# Patient Record
Sex: FEMALE | Race: WHITE | NOT HISPANIC OR LATINO | Employment: OTHER
[De-identification: names, ages, dates, MRNs, and addresses within clinical notes are randomized per-mention and may not be internally consistent; named-entity substitution may affect disease eponyms.]

---

## 2017-02-15 ENCOUNTER — RECORDS - HEALTHEAST (OUTPATIENT)
Dept: ADMINISTRATIVE | Facility: OTHER | Age: 62
End: 2017-02-15

## 2017-02-19 ENCOUNTER — COMMUNICATION - HEALTHEAST (OUTPATIENT)
Dept: INTERNAL MEDICINE | Facility: CLINIC | Age: 62
End: 2017-02-19

## 2017-02-19 DIAGNOSIS — I10 HTN (HYPERTENSION): ICD-10-CM

## 2017-03-24 ENCOUNTER — COMMUNICATION - HEALTHEAST (OUTPATIENT)
Dept: SCHEDULING | Facility: CLINIC | Age: 62
End: 2017-03-24

## 2017-03-24 ENCOUNTER — OFFICE VISIT - HEALTHEAST (OUTPATIENT)
Dept: INTERNAL MEDICINE | Facility: CLINIC | Age: 62
End: 2017-03-24

## 2017-03-24 DIAGNOSIS — J06.9 URI (UPPER RESPIRATORY INFECTION): ICD-10-CM

## 2017-03-24 ASSESSMENT — MIFFLIN-ST. JEOR: SCORE: 1314.15

## 2017-03-31 ENCOUNTER — COMMUNICATION - HEALTHEAST (OUTPATIENT)
Dept: INTERNAL MEDICINE | Facility: CLINIC | Age: 62
End: 2017-03-31

## 2017-05-10 ENCOUNTER — COMMUNICATION - HEALTHEAST (OUTPATIENT)
Dept: INTERNAL MEDICINE | Facility: CLINIC | Age: 62
End: 2017-05-10

## 2017-05-10 DIAGNOSIS — I10 HTN (HYPERTENSION): ICD-10-CM

## 2017-05-11 ENCOUNTER — RECORDS - HEALTHEAST (OUTPATIENT)
Dept: ADMINISTRATIVE | Facility: OTHER | Age: 62
End: 2017-05-11

## 2017-05-16 ENCOUNTER — RECORDS - HEALTHEAST (OUTPATIENT)
Dept: ADMINISTRATIVE | Facility: OTHER | Age: 62
End: 2017-05-16

## 2017-06-22 ENCOUNTER — OFFICE VISIT - HEALTHEAST (OUTPATIENT)
Dept: INTERNAL MEDICINE | Facility: CLINIC | Age: 62
End: 2017-06-22

## 2017-06-22 DIAGNOSIS — Z01.818 PREOP EXAMINATION: ICD-10-CM

## 2017-06-22 LAB
ATRIAL RATE - MUSE: 56 BPM
DIASTOLIC BLOOD PRESSURE - MUSE: NORMAL MMHG
INTERPRETATION ECG - MUSE: NORMAL
P AXIS - MUSE: 64 DEGREES
PR INTERVAL - MUSE: 140 MS
QRS DURATION - MUSE: 86 MS
QT - MUSE: 466 MS
QTC - MUSE: 449 MS
R AXIS - MUSE: 63 DEGREES
SYSTOLIC BLOOD PRESSURE - MUSE: NORMAL MMHG
T AXIS - MUSE: 53 DEGREES
VENTRICULAR RATE- MUSE: 56 BPM

## 2017-06-22 ASSESSMENT — MIFFLIN-ST. JEOR: SCORE: 1297.14

## 2017-06-23 ENCOUNTER — COMMUNICATION - HEALTHEAST (OUTPATIENT)
Dept: INTERNAL MEDICINE | Facility: CLINIC | Age: 62
End: 2017-06-23

## 2017-06-26 ENCOUNTER — ANESTHESIA - HEALTHEAST (OUTPATIENT)
Dept: SURGERY | Facility: HOSPITAL | Age: 62
End: 2017-06-26

## 2017-06-26 ASSESSMENT — MIFFLIN-ST. JEOR: SCORE: 1297.14

## 2017-06-27 ENCOUNTER — SURGERY - HEALTHEAST (OUTPATIENT)
Dept: SURGERY | Facility: HOSPITAL | Age: 62
End: 2017-06-27

## 2017-06-27 ASSESSMENT — MIFFLIN-ST. JEOR: SCORE: 1288.86

## 2017-08-13 ENCOUNTER — COMMUNICATION - HEALTHEAST (OUTPATIENT)
Dept: INTERNAL MEDICINE | Facility: CLINIC | Age: 62
End: 2017-08-13

## 2017-08-13 DIAGNOSIS — I10 HTN (HYPERTENSION): ICD-10-CM

## 2017-09-29 ENCOUNTER — RECORDS - HEALTHEAST (OUTPATIENT)
Dept: ADMINISTRATIVE | Facility: OTHER | Age: 62
End: 2017-09-29

## 2017-10-18 ENCOUNTER — HOSPITAL ENCOUNTER (OUTPATIENT)
Dept: MAMMOGRAPHY | Facility: CLINIC | Age: 62
Discharge: HOME | End: 2017-10-18
Attending: OBSTETRICS & GYNECOLOGY

## 2017-10-18 DIAGNOSIS — Z12.31 VISIT FOR SCREENING MAMMOGRAM: ICD-10-CM

## 2017-11-20 ENCOUNTER — COMMUNICATION - HEALTHEAST (OUTPATIENT)
Dept: INTERNAL MEDICINE | Facility: CLINIC | Age: 62
End: 2017-11-20

## 2017-11-20 DIAGNOSIS — I10 HTN (HYPERTENSION): ICD-10-CM

## 2017-11-30 ENCOUNTER — OFFICE VISIT - HEALTHEAST (OUTPATIENT)
Dept: INTERNAL MEDICINE | Facility: CLINIC | Age: 62
End: 2017-11-30

## 2017-11-30 DIAGNOSIS — I10 ESSENTIAL HYPERTENSION: ICD-10-CM

## 2017-11-30 ASSESSMENT — MIFFLIN-ST. JEOR: SCORE: 1287.5

## 2017-12-01 ENCOUNTER — COMMUNICATION - HEALTHEAST (OUTPATIENT)
Dept: INTERNAL MEDICINE | Facility: CLINIC | Age: 62
End: 2017-12-01

## 2018-02-12 ENCOUNTER — COMMUNICATION - HEALTHEAST (OUTPATIENT)
Dept: INTERNAL MEDICINE | Facility: CLINIC | Age: 63
End: 2018-02-12

## 2018-02-12 DIAGNOSIS — I10 HTN (HYPERTENSION): ICD-10-CM

## 2018-05-02 ENCOUNTER — COMMUNICATION - HEALTHEAST (OUTPATIENT)
Dept: INTERNAL MEDICINE | Facility: CLINIC | Age: 63
End: 2018-05-02

## 2018-05-02 DIAGNOSIS — I10 HTN (HYPERTENSION): ICD-10-CM

## 2018-05-04 ENCOUNTER — COMMUNICATION - HEALTHEAST (OUTPATIENT)
Dept: INTERNAL MEDICINE | Facility: CLINIC | Age: 63
End: 2018-05-04

## 2018-05-04 ENCOUNTER — RECORDS - HEALTHEAST (OUTPATIENT)
Dept: ADMINISTRATIVE | Facility: OTHER | Age: 63
End: 2018-05-04

## 2018-05-04 DIAGNOSIS — I10 ESSENTIAL HYPERTENSION: ICD-10-CM

## 2018-08-17 ENCOUNTER — COMMUNICATION - HEALTHEAST (OUTPATIENT)
Dept: INTERNAL MEDICINE | Facility: CLINIC | Age: 63
End: 2018-08-17

## 2018-08-17 DIAGNOSIS — I10 ESSENTIAL HYPERTENSION: ICD-10-CM

## 2018-10-29 ENCOUNTER — HOSPITAL ENCOUNTER (OUTPATIENT)
Dept: MAMMOGRAPHY | Facility: CLINIC | Age: 63
Discharge: HOME | End: 2018-10-29
Attending: INTERNAL MEDICINE

## 2018-10-29 DIAGNOSIS — Z12.31 VISIT FOR SCREENING MAMMOGRAM: ICD-10-CM

## 2018-12-03 ENCOUNTER — COMMUNICATION - HEALTHEAST (OUTPATIENT)
Dept: INTERNAL MEDICINE | Facility: CLINIC | Age: 63
End: 2018-12-03

## 2018-12-03 DIAGNOSIS — I10 ESSENTIAL HYPERTENSION: ICD-10-CM

## 2019-02-04 ENCOUNTER — COMMUNICATION - HEALTHEAST (OUTPATIENT)
Dept: INTERNAL MEDICINE | Facility: CLINIC | Age: 64
End: 2019-02-04

## 2019-02-04 DIAGNOSIS — I10 ESSENTIAL HYPERTENSION: ICD-10-CM

## 2019-02-11 ENCOUNTER — COMMUNICATION - HEALTHEAST (OUTPATIENT)
Dept: INTERNAL MEDICINE | Facility: CLINIC | Age: 64
End: 2019-02-11

## 2019-02-11 DIAGNOSIS — I10 HTN (HYPERTENSION): ICD-10-CM

## 2019-02-25 ENCOUNTER — COMMUNICATION - HEALTHEAST (OUTPATIENT)
Dept: INTERNAL MEDICINE | Facility: CLINIC | Age: 64
End: 2019-02-25

## 2019-02-25 DIAGNOSIS — I10 ESSENTIAL HYPERTENSION: ICD-10-CM

## 2019-05-06 ENCOUNTER — COMMUNICATION - HEALTHEAST (OUTPATIENT)
Dept: INTERNAL MEDICINE | Facility: CLINIC | Age: 64
End: 2019-05-06

## 2019-05-06 DIAGNOSIS — I10 ESSENTIAL HYPERTENSION: ICD-10-CM

## 2019-05-13 ENCOUNTER — COMMUNICATION - HEALTHEAST (OUTPATIENT)
Dept: INTERNAL MEDICINE | Facility: CLINIC | Age: 64
End: 2019-05-13

## 2019-05-13 DIAGNOSIS — I10 ESSENTIAL HYPERTENSION: ICD-10-CM

## 2019-08-02 ENCOUNTER — COMMUNICATION - HEALTHEAST (OUTPATIENT)
Dept: INTERNAL MEDICINE | Facility: CLINIC | Age: 64
End: 2019-08-02

## 2019-08-02 DIAGNOSIS — I10 ESSENTIAL HYPERTENSION: ICD-10-CM

## 2019-08-20 ENCOUNTER — COMMUNICATION - HEALTHEAST (OUTPATIENT)
Dept: INTERNAL MEDICINE | Facility: CLINIC | Age: 64
End: 2019-08-20

## 2019-08-20 DIAGNOSIS — I10 ESSENTIAL HYPERTENSION: ICD-10-CM

## 2019-10-28 ENCOUNTER — COMMUNICATION - HEALTHEAST (OUTPATIENT)
Dept: INTERNAL MEDICINE | Facility: CLINIC | Age: 64
End: 2019-10-28

## 2019-10-28 DIAGNOSIS — I10 ESSENTIAL HYPERTENSION: ICD-10-CM

## 2019-11-14 ENCOUNTER — RECORDS - HEALTHEAST (OUTPATIENT)
Dept: ADMINISTRATIVE | Facility: OTHER | Age: 64
End: 2019-11-14

## 2019-11-19 ENCOUNTER — COMMUNICATION - HEALTHEAST (OUTPATIENT)
Dept: INTERNAL MEDICINE | Facility: CLINIC | Age: 64
End: 2019-11-19

## 2019-11-19 DIAGNOSIS — I10 HTN (HYPERTENSION): ICD-10-CM

## 2020-10-13 ENCOUNTER — COMMUNICATION - HEALTHEAST (OUTPATIENT)
Dept: INTERNAL MEDICINE | Facility: CLINIC | Age: 65
End: 2020-10-13

## 2020-10-13 DIAGNOSIS — I10 ESSENTIAL HYPERTENSION: ICD-10-CM

## 2020-10-17 RX ORDER — ATENOLOL AND CHLORTHALIDONE TABLET 50; 25 MG/1; MG/1
1 TABLET ORAL DAILY
Qty: 90 TABLET | Refills: 3 | Status: SHIPPED | OUTPATIENT
Start: 2020-10-17 | End: 2021-10-18

## 2020-10-17 RX ORDER — QUINAPRIL 40 MG/1
40 TABLET ORAL AT BEDTIME
Qty: 90 TABLET | Refills: 4 | Status: SHIPPED | OUTPATIENT
Start: 2020-10-17 | End: 2021-11-15

## 2020-10-27 ENCOUNTER — COMMUNICATION - HEALTHEAST (OUTPATIENT)
Dept: INTERNAL MEDICINE | Facility: CLINIC | Age: 65
End: 2020-10-27

## 2020-10-27 DIAGNOSIS — I10 ESSENTIAL HYPERTENSION: ICD-10-CM

## 2021-01-17 ENCOUNTER — COMMUNICATION - HEALTHEAST (OUTPATIENT)
Dept: INTERNAL MEDICINE | Facility: CLINIC | Age: 66
End: 2021-01-17

## 2021-01-17 DIAGNOSIS — I10 HTN (HYPERTENSION): ICD-10-CM

## 2021-01-18 RX ORDER — POTASSIUM CHLORIDE 1500 MG/1
TABLET, EXTENDED RELEASE ORAL
Qty: 90 TABLET | Refills: 3 | Status: SHIPPED | OUTPATIENT
Start: 2021-01-18 | End: 2022-01-25

## 2021-03-03 ENCOUNTER — COMMUNICATION - HEALTHEAST (OUTPATIENT)
Dept: INTERNAL MEDICINE | Facility: CLINIC | Age: 66
End: 2021-03-03

## 2021-03-23 ENCOUNTER — COMMUNICATION - HEALTHEAST (OUTPATIENT)
Dept: INTERNAL MEDICINE | Facility: CLINIC | Age: 66
End: 2021-03-23

## 2021-03-25 ENCOUNTER — OFFICE VISIT - HEALTHEAST (OUTPATIENT)
Dept: INTERNAL MEDICINE | Facility: CLINIC | Age: 66
End: 2021-03-25

## 2021-03-25 DIAGNOSIS — Z12.31 VISIT FOR SCREENING MAMMOGRAM: ICD-10-CM

## 2021-03-25 DIAGNOSIS — Z01.818 PREOPERATIVE EXAMINATION: ICD-10-CM

## 2021-03-25 LAB
HGB BLD-MCNC: 14.1 G/DL (ref 12–16)
POTASSIUM BLD-SCNC: 4.9 MMOL/L (ref 3.5–5)

## 2021-03-25 ASSESSMENT — MIFFLIN-ST. JEOR: SCORE: 1329.46

## 2021-03-26 ENCOUNTER — COMMUNICATION - HEALTHEAST (OUTPATIENT)
Dept: INTERNAL MEDICINE | Facility: CLINIC | Age: 66
End: 2021-03-26

## 2021-05-07 ENCOUNTER — RECORDS - HEALTHEAST (OUTPATIENT)
Dept: ADMINISTRATIVE | Facility: OTHER | Age: 66
End: 2021-05-07

## 2021-05-28 ENCOUNTER — RECORDS - HEALTHEAST (OUTPATIENT)
Dept: ADMINISTRATIVE | Facility: CLINIC | Age: 66
End: 2021-05-28

## 2021-05-28 NOTE — TELEPHONE ENCOUNTER
RN cannot approve Refill Request    RN can NOT refill this medication PCP messaged that patient is overdue for Labs and Office Visit.     Last office visit: 11/30/2017 Kaiden Pruitt MD Last Physical: 6/22/2017 Last MTM visit: Visit date not found Last visit same specialty: 11/30/2017 Kaiden Pruitt MD.  Next visit within 3 mo: Visit date not found  Next physical within 3 mo: Visit date not found      Jessica Blunt, [facility] Triage/Med Refill 5/6/2019    Requested Prescriptions   Pending Prescriptions Disp Refills     atenolol-chlorthalidone (TENORETIC) 50-25 mg per tablet [Pharmacy Med Name: ATENOLOL/CHLORTHALIDONE 50/25 TABS] 90 tablet 0     Sig: TAKE 1 TABLET BY MOUTH DAILY       Diuretics/Combination Diuretics Refill Protocol  Failed - 5/6/2019  3:25 AM        Failed - Visit with PCP or prescribing provider visit in past 12 months     Last office visit with prescriber/PCP: 11/30/2017 Kaiden Pruitt MD OR same dept: Visit date not found OR same specialty: 11/30/2017 Kaiden Pruitt MD  Last physical: 6/22/2017 Last MTM visit: Visit date not found   Next visit within 3 mo: Visit date not found  Next physical within 3 mo: Visit date not found  Prescriber OR PCP: Kaiden Pruitt MD  Last diagnosis associated with med order: 1. Essential hypertension  - atenolol-chlorthalidone (TENORETIC) 50-25 mg per tablet [Pharmacy Med Name: ATENOLOL/CHLORTHALIDONE 50/25 TABS]; TAKE 1 TABLET BY MOUTH DAILY  Dispense: 90 tablet; Refill: 0    If protocol passes may refill for 12 months if within 3 months of last provider visit (or a total of 15 months).             Failed - Serum Potassium in past 12 months      No results found for: LN-POTASSIUM          Failed - Serum Sodium in past 12 months      No results found for: LN-SODIUM          Failed - Blood pressure on file in past 12 months     BP Readings from Last 1 Encounters:   11/30/17 126/68             Failed - Serum Creatinine in past 12 months       Creatinine   Date Value Ref Range Status   06/22/2017 0.74 0.60 - 1.10 mg/dL Final

## 2021-05-29 ENCOUNTER — RECORDS - HEALTHEAST (OUTPATIENT)
Dept: ADMINISTRATIVE | Facility: CLINIC | Age: 66
End: 2021-05-29

## 2021-05-30 ENCOUNTER — RECORDS - HEALTHEAST (OUTPATIENT)
Dept: ADMINISTRATIVE | Facility: CLINIC | Age: 66
End: 2021-05-30

## 2021-05-30 VITALS — WEIGHT: 158 LBS | HEIGHT: 68 IN | BODY MASS INDEX: 23.95 KG/M2

## 2021-05-31 VITALS — HEIGHT: 68 IN | BODY MASS INDEX: 23.64 KG/M2 | WEIGHT: 156 LBS

## 2021-05-31 VITALS — HEIGHT: 68 IN | WEIGHT: 153 LBS | BODY MASS INDEX: 23.19 KG/M2

## 2021-05-31 VITALS — WEIGHT: 153.3 LBS | HEIGHT: 68 IN | BODY MASS INDEX: 23.23 KG/M2

## 2021-05-31 NOTE — TELEPHONE ENCOUNTER
RN cannot approve Refill Request    RN can NOT refill this medication PCP messaged that patient is overdue for Office Visit. Last office visit: 11/30/2017 Kaiden Pruitt MD Last Physical: 6/22/2017 Last MTM visit: Visit date not found Last visit same specialty: 11/30/2017 Kaiden Pruitt MD.  Next visit within 3 mo: Visit date not found  Next physical within 3 mo: Visit date not found      Jessica Blunt, [facility] Connection Triage/Med Refill 8/2/2019    Requested Prescriptions   Pending Prescriptions Disp Refills     atenolol-chlorthalidone (TENORETIC) 50-25 mg per tablet [Pharmacy Med Name: ATENOLOL/CHLORTHALIDONE 50/25 TABS] 90 tablet 0     Sig: TAKE 1 TABLET BY MOUTH DAILY       Diuretics/Combination Diuretics Refill Protocol  Failed - 8/2/2019  3:25 AM        Failed - Visit with PCP or prescribing provider visit in past 12 months     Last office visit with prescriber/PCP: 11/30/2017 Kaiden Pruitt MD OR same dept: Visit date not found OR same specialty: 11/30/2017 Kaiden Pruitt MD  Last physical: 6/22/2017 Last MTM visit: Visit date not found   Next visit within 3 mo: Visit date not found  Next physical within 3 mo: Visit date not found  Prescriber OR PCP: Kaiden Pruitt MD  Last diagnosis associated with med order: 1. Essential hypertension  - atenolol-chlorthalidone (TENORETIC) 50-25 mg per tablet [Pharmacy Med Name: ATENOLOL/CHLORTHALIDONE 50/25 TABS]; TAKE 1 TABLET BY MOUTH DAILY  Dispense: 90 tablet; Refill: 0    If protocol passes may refill for 12 months if within 3 months of last provider visit (or a total of 15 months).             Failed - Serum Potassium in past 12 months      No results found for: LN-POTASSIUM          Failed - Serum Sodium in past 12 months      No results found for: LN-SODIUM          Failed - Blood pressure on file in past 12 months     BP Readings from Last 1 Encounters:   11/30/17 126/68             Failed - Serum Creatinine in past 12 months       Creatinine   Date Value Ref Range Status   06/22/2017 0.74 0.60 - 1.10 mg/dL Final

## 2021-05-31 NOTE — TELEPHONE ENCOUNTER
RN cannot approve Refill Request    RN can NOT refill this medication PCP messaged that patient is overdue for Labs and Office Visit. Last office visit: 11/30/2017 Kaiden Pruitt MD Last Physical: 6/22/2017 Last MTM visit: Visit date not found Last visit same specialty: 11/30/2017 Kaiden Pruitt MD.  Next visit within 3 mo: Visit date not found  Next physical within 3 mo: Visit date not found      Linda DAVIS Marcelino, [facility] Triage/Med Refill 8/21/2019    Requested Prescriptions   Pending Prescriptions Disp Refills     quinapril (ACCUPRIL) 40 MG tablet [Pharmacy Med Name: QUINAPRIL TABS 40MG] 90 tablet 4     Sig: TAKE 1 TABLET AT BEDTIME       Ace Inhibitors Refill Protocol Failed - 8/20/2019  7:38 AM        Failed - PCP or prescribing provider visit in past 12 months       Last office visit with prescriber/PCP: 11/30/2017 Kaiden Pruitt MD OR same dept: Visit date not found OR same specialty: 11/30/2017 Kaiden Pruitt MD  Last physical: 6/22/2017 Last MTM visit: Visit date not found   Next visit within 3 mo: Visit date not found  Next physical within 3 mo: Visit date not found  Prescriber OR PCP: Kaiden Pruitt MD  Last diagnosis associated with med order: 1. Essential hypertension  - quinapril (ACCUPRIL) 40 MG tablet [Pharmacy Med Name: QUINAPRIL TABS 40MG]; TAKE 1 TABLET AT BEDTIME  Dispense: 90 tablet; Refill: 4    If protocol passes may refill for 12 months if within 3 months of last provider visit (or a total of 15 months).             Failed - Serum Potassium in past 12 months     No results found for: LN-POTASSIUM          Failed - Blood pressure filed in past 12 months     BP Readings from Last 1 Encounters:   11/30/17 126/68             Failed - Serum Creatinine in past 12 months     Creatinine   Date Value Ref Range Status   06/22/2017 0.74 0.60 - 1.10 mg/dL Final

## 2021-06-02 NOTE — TELEPHONE ENCOUNTER
RN cannot approve Refill Request    RN can NOT refill this medication Protocol failed and NO refill given.      Mary Ellen Jones, [facility] Triage/Med Refill 10/28/2019    Requested Prescriptions   Pending Prescriptions Disp Refills     atenolol-chlorthalidone (TENORETIC) 50-25 mg per tablet [Pharmacy Med Name: ATENOLOL/CHLORTHALIDONE 50/25 TABS] 90 tablet 3     Sig: TAKE 1 TABLET BY MOUTH DAILY       Diuretics/Combination Diuretics Refill Protocol  Failed - 10/28/2019  3:24 AM        Failed - Visit with PCP or prescribing provider visit in past 12 months     Last office visit with prescriber/PCP: 11/30/2017 Kaiden Pruitt MD OR same dept: Visit date not found OR same specialty: 11/30/2017 Kaiden Pruitt MD  Last physical: 6/22/2017 Last MTM visit: Visit date not found   Next visit within 3 mo: Visit date not found  Next physical within 3 mo: Visit date not found  Prescriber OR PCP: Kaiden Pruitt MD  Last diagnosis associated with med order: 1. Essential hypertension  - atenolol-chlorthalidone (TENORETIC) 50-25 mg per tablet [Pharmacy Med Name: ATENOLOL/CHLORTHALIDONE 50/25 TABS]; TAKE 1 TABLET BY MOUTH DAILY  Dispense: 90 tablet; Refill: 0    If protocol passes may refill for 12 months if within 3 months of last provider visit (or a total of 15 months).             Failed - Serum Potassium in past 12 months      No results found for: LN-POTASSIUM          Failed - Serum Sodium in past 12 months      No results found for: LN-SODIUM          Failed - Blood pressure on file in past 12 months     BP Readings from Last 1 Encounters:   11/30/17 126/68             Failed - Serum Creatinine in past 12 months      Creatinine   Date Value Ref Range Status   06/22/2017 0.74 0.60 - 1.10 mg/dL Final

## 2021-06-03 NOTE — TELEPHONE ENCOUNTER
RN cannot approve Refill Request    RN can NOT refill this medication PCP messaged that patient is overdue for Labs and Office Visit. Last office visit: 11/30/2017 Kaiden Pruitt MD Last Physical: 6/22/2017 Last MTM visit: Visit date not found Last visit same specialty: 11/30/2017 Kaiden Pruitt MD.  Next visit within 3 mo: Visit date not found  Next physical within 3 mo: Visit date not found      Hali Pena, [facility] Triage/Med Refill 11/19/2019    Requested Prescriptions   Pending Prescriptions Disp Refills     potassium chloride (K-DUR,KLOR-CON) 20 MEQ tablet [Pharmacy Med Name: POT CHLOR ER (DISP) TABS 20MEQ] 90 tablet 4     Sig: TAKE 1 TABLET DAILY       Potassium Supplements Refill Protocol Failed - 11/19/2019 10:05 AM        Failed - PCP or prescribing provider visit in past 12 months       Last office visit with prescriber/PCP: 11/30/2017 Kaiden Pruitt MD OR same dept: Visit date not found OR same specialty: 11/30/2017 Kaiden Pruitt MD  Last physical: 6/22/2017 Last MTM visit: Visit date not found   Next visit within 3 mo: Visit date not found  Next physical within 3 mo: Visit date not found  Prescriber OR PCP: Kaiden Pruitt MD  Last diagnosis associated with med order: 1. HTN (hypertension)  - potassium chloride (K-DUR,KLOR-CON) 20 MEQ tablet [Pharmacy Med Name: POT CHLOR ER (DISP) TABS 20MEQ]; TAKE 1 TABLET DAILY  Dispense: 90 tablet; Refill: 4    If protocol passes may refill for 12 months if within 3 months of last provider visit (or a total of 15 months).             Failed - Potassium level in last 12 months     No results found for: LN-POTASSIUM

## 2021-06-05 VITALS
HEIGHT: 68 IN | TEMPERATURE: 97.8 F | BODY MASS INDEX: 24.86 KG/M2 | DIASTOLIC BLOOD PRESSURE: 88 MMHG | SYSTOLIC BLOOD PRESSURE: 156 MMHG | HEART RATE: 64 BPM | OXYGEN SATURATION: 99 % | WEIGHT: 164 LBS

## 2021-06-09 NOTE — PROGRESS NOTES
[facility] Follow Up Note    Kristin Almanzar   61 y.o. female    Date of Visit: 3/24/2017    Chief Complaint   Patient presents with     Lymphadenopathy     has been ill this week     Subjective  This is a 61-year-old patient of Dr. Kaiden Pruitt.  She comes in with about a 5 day history of a respiratory type of infection.  She has had discomfort in the vicinity of the left ear in the left side of the face.  Her throat has not been particularly sore and there has been no coughing.  No drainage from the ear.  At one point she did have a low-grade fever.  She has noticed some enlargement of lymph nodes in the neck bilaterally.  As the symptoms were not improving as the week went on she thought she should be seen before the weekend.  On examination her blood pressure is 110/52.  Weight is 158 pounds and height is 68 inches.    ROS A comprehensive review of systems was performed and was otherwise negative    Medications, allergies, and problem list were reviewed and updated    Exam  General Appearance: On examination her blood pressure is 110/52.  Weight is 158 pounds and height is 68 inches.  BMI is 23.85.    Heart is in a sinus rhythm with a rate of 54 and no ectopy.    Examination of the left ear shows a normal external ear and canal with a small amount of cerumen.  No sign of infection.    She does have several enlarged palpable lymph nodes on the right side of the neck posteriorly and on the left side of the neck more anteriorly.  These nodes are not particularly tender.    The patient is alert and oriented ×3.          Assessment/Plan  1. URI (upper respiratory infection)         I believe this is likely an infection and I think an antibiotic would be in order.  We will place her on a Z-Josué and have her follow-up with her own physician next week if she is not improving.      Jc Dodd MD      Current Outpatient Prescriptions on File Prior to Visit   Medication Sig      atenolol-chlorthalidone (TENORETIC) 50-25 mg per tablet TAKE ONE TABLET BY MOUTH EVERY DAY     potassium chloride SA (K-DUR,KLOR-CON) 20 MEQ tablet TAKE ONE TABLET BY MOUTH EVERY DAY     quinapril (ACCUPRIL) 40 MG tablet TAKE ONE TABLET BY MOUTH AT BEDTIME     No current facility-administered medications on file prior to visit.      Allergies   Allergen Reactions     Amoxicillin      Propoxyphene      Social History   Substance Use Topics     Smoking status: Never Smoker     Smokeless tobacco: None     Alcohol use None
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
## 2021-06-11 NOTE — ANESTHESIA CARE TRANSFER NOTE
Last vitals:   Vitals:    06/27/17 1000   BP: 154/67   Pulse: (!) 57   Resp: 18   Temp: 36.6  C (97.9  F)   SpO2: 100%     Patient's level of consciousness is drowsy  Spontaneous respirations: yes  Maintains airway independently: yes  Dentition unchanged: yes  Oropharynx: oropharynx clear of all foreign objects    QCDR Measures:  ASA# 20 - Surgical Safety Checklist: ASA20A - Safety Checks Done  PQRS# 430 - Adult PONV Prevention: 4558F - Pt received => 2 anti-emetic agents (different classes) preop & intraop  ASA# 8 - Peds PONV Prevention: NA - Not pediatric patient, not GA or 2 or more risk factors NOT present  PQRS# 424 - Risa-op Temp Management: 4559F - At least one body temp DOCUMENTED => 35.5C or 95.9F within required timeframe  PQRS# 426 - PACU Transfer Protocol: - Transfer of care checklist used  ASA# 14 - Acute Post-op Pain: ASA14B - Patient did NOT experience pain >= 7 out of 10

## 2021-06-11 NOTE — ANESTHESIA PREPROCEDURE EVALUATION
Anesthesia Evaluation      Patient summary reviewed     Airway   Mallampati: II  Neck ROM: full   Pulmonary - normal exam                          Cardiovascular - normal exam  (+) hypertension well controlled, ,      Neuro/Psych - negative ROS     Endo/Other - negative ROS      GI/Hepatic/Renal - negative ROS           Dental                         Anesthesia Plan  Planned anesthetic: general endotracheal    ASA 2   Induction: intravenous   Anesthetic plan and risks discussed with: patient    Post-op plan: routine recovery

## 2021-06-11 NOTE — ANESTHESIA POSTPROCEDURE EVALUATION
Patient: Kristin Almanzar  VAGINAL HYSTERECTOMY,  UTEROSACRAL LIGAMENT SUSPENSION CYSTOSCOPY  Anesthesia type: general    Patient location: PACU  Last vitals:   Vitals:    06/27/17 1155   BP: 113/48   Pulse: (!) 51   Resp: 18   Temp: 36.9  C (98.5  F)   SpO2: 100%     Post vital signs: stable  Level of consciousness: awake and responds to simple questions  Post-anesthesia pain: pain controlled  Post-anesthesia nausea and vomiting: no  Pulmonary: unassisted, return to baseline  Cardiovascular: stable and blood pressure at baseline  Hydration: adequate  Anesthetic events: no    QCDR Measures:  ASA# 11 - Risa-op Cardiac Arrest: ASA11B - Patient did NOT experience unanticipated cardiac arrest  ASA# 12 - Risa-op Mortality Rate: ASA12B - Patient did NOT die  ASA# 13 - PACU Re-Intubation Rate: ASA13B - Patient did NOT require a new airway mgmt  ASA# 10 - Composite Anes Safety: ASA10A - No serious adverse event  ASA# 38 - New Corneal Injury: ASA38A - No new exposure keratitis or corneal abrasion in PACU       Additional Notes:

## 2021-06-12 NOTE — TELEPHONE ENCOUNTER
Refill Request  Did you contact pharmacy: No  Medication name:   Requested Prescriptions     Pending Prescriptions Disp Refills     atenoloL-chlorthalidone (TENORETIC) 50-25 mg per tablet 90 tablet 3     Sig: Take 1 tablet by mouth daily.     quinapriL (ACCUPRIL) 40 MG tablet 90 tablet 4     Sig: Take 1 tablet (40 mg total) by mouth at bedtime.     Who prescribed the medication: Kaiden Pruitt MD    Requested Pharmacy: [facility]  Is patient out of medication: No  Patient notified refills processed in 3 business days:  yes  Okay to leave a detailed message: yes

## 2021-06-12 NOTE — TELEPHONE ENCOUNTER
RN cannot approve Refill Request    RN can NOT refill this medication Protocol failed and NO refill given. Last office visit: 11/30/2017 Kaiden Pruitt MD Last Physical: 6/22/2017 Last MTM visit: Visit date not found Last visit same specialty: 11/30/2017 Kaiden Pruitt MD.  Next visit within 3 mo: Visit date not found  Next physical within 3 mo: Visit date not found      Eileen Caro, [facility] Triage/Med Refill 10/16/2020    Requested Prescriptions   Pending Prescriptions Disp Refills     atenoloL-chlorthalidone (TENORETIC) 50-25 mg per tablet 90 tablet 3     Sig: Take 1 tablet by mouth daily.       Diuretics/Combination Diuretics Refill Protocol  Failed - 10/13/2020  1:04 PM        Failed - Visit with PCP or prescribing provider visit in past 12 months     Last office visit with prescriber/PCP: 11/30/2017 Kaiden Pruitt MD OR same dept: Visit date not found OR same specialty: 11/30/2017 Kaiden Pruitt MD  Last physical: 6/22/2017 Last MTM visit: Visit date not found   Next visit within 3 mo: Visit date not found  Next physical within 3 mo: Visit date not found  Prescriber OR PCP: Kaiden Pruitt MD  Last diagnosis associated with med order: 1. Essential hypertension  - atenoloL-chlorthalidone (TENORETIC) 50-25 mg per tablet; Take 1 tablet by mouth daily.  Dispense: 90 tablet; Refill: 3  - quinapriL (ACCUPRIL) 40 MG tablet; Take 1 tablet (40 mg total) by mouth at bedtime.  Dispense: 90 tablet; Refill: 4    If protocol passes may refill for 12 months if within 3 months of last provider visit (or a total of 15 months).             Failed - Serum Potassium in past 12 months      No results found for: LN-POTASSIUM          Failed - Serum Sodium in past 12 months      No results found for: LN-SODIUM          Failed - Blood pressure on file in past 12 months     BP Readings from Last 1 Encounters:   11/30/17 126/68             Failed - Serum Creatinine in past 12 months      Creatinine    Date Value Ref Range Status   06/22/2017 0.74 0.60 - 1.10 mg/dL Final                quinapriL (ACCUPRIL) 40 MG tablet 90 tablet 4     Sig: Take 1 tablet (40 mg total) by mouth at bedtime.       Ace Inhibitors Refill Protocol Failed - 10/13/2020  1:04 PM        Failed - PCP or prescribing provider visit in past 12 months       Last office visit with prescriber/PCP: 11/30/2017 Kaiden Pruitt MD OR same dept: Visit date not found OR same specialty: 11/30/2017 Kaiden Pruitt MD  Last physical: 6/22/2017 Last MTM visit: Visit date not found   Next visit within 3 mo: Visit date not found  Next physical within 3 mo: Visit date not found  Prescriber OR PCP: Kaiden Pruitt MD  Last diagnosis associated with med order: 1. Essential hypertension  - atenoloL-chlorthalidone (TENORETIC) 50-25 mg per tablet; Take 1 tablet by mouth daily.  Dispense: 90 tablet; Refill: 3  - quinapriL (ACCUPRIL) 40 MG tablet; Take 1 tablet (40 mg total) by mouth at bedtime.  Dispense: 90 tablet; Refill: 4    If protocol passes may refill for 12 months if within 3 months of last provider visit (or a total of 15 months).             Failed - Serum Potassium in past 12 months     No results found for: LN-POTASSIUM          Failed - Blood pressure filed in past 12 months     BP Readings from Last 1 Encounters:   11/30/17 126/68             Failed - Serum Creatinine in past 12 months     Creatinine   Date Value Ref Range Status   06/22/2017 0.74 0.60 - 1.10 mg/dL Final

## 2021-06-14 NOTE — PROGRESS NOTES
Office Visit - Follow up    Kristin Almanzar   62 y.o. female    Date of Visit: 11/30/2017    Chief Complaint   Patient presents with     Hypertension     blood pressure check       Subjective: Hypertension.    Wants thyroid checked.  History of goiter.    Allergy amoxicillin and propoxyphene the latter for Darvon.    Mammogram allCLEAR October 18, 2017 flu shot given November 11, 2017.  Colonoscopy last done September 29, 2017 showed all normal postsurgical anatomy.  Patient's past health history includes polyps of the colon.  No blood in stool or urine denies chest pain or shortness of breath feels well otherwise.  The patient has not had any target organ damage related to hypertension that is no history of MI or stroke or chronic kidney disease CHF or atrial fibrillation.  Medication list reviewed generally well-tolerated.    ROS: A comprehensive review of systems was performed and was otherwise negative    Medications:  Prior to Admission medications    Medication Sig Start Date End Date Taking? Authorizing Provider   atenolol-chlorthalidone (TENORETIC) 50-25 mg per tablet TAKE ONE TABLET BY MOUTH EVERY DAY 11/20/17  Yes Kaiden Pruitt MD   potassium chloride SA (K-DUR,KLOR-CON) 20 MEQ tablet TAKE ONE TABLET BY MOUTH EVERY DAY 11/20/17  Yes Kaiden Pruitt MD   quinapril (ACCUPRIL) 40 MG tablet TAKE ONE TABLET BY MOUTH AT BEDTIME 11/20/17  Yes Kaiden Pruitt MD   atenolol-chlorthalidone (TENORETIC) 50-25 mg per tablet Take 1 tablet by mouth at bedtime.  11/30/17  PROVIDER, HISTORICAL   potassium chloride SA (K-DUR,KLOR-CON) 20 MEQ tablet Take 20 mEq by mouth at bedtime.  11/30/17  PROVIDER, HISTORICAL   quinapril (ACCUPRIL) 40 MG tablet Take 40 mg by mouth at bedtime.  11/30/17  PROVIDER, HISTORICAL   senna-docusate (PERICOLACE) 8.6-50 mg tablet Take 1 tablet by mouth 2 (two) times a day. 6/28/17 11/30/17  Finesse Hinojosa MD       Allergies:   Allergies   Allergen Reactions     Amoxicillin Rash      Propoxyphene Rash     Red blotches on face       Immunizations:   Immunization History   Administered Date(s) Administered     DT (pediatric) 06/30/1999     Td,adult,historic,unspecified 06/30/1999     Tdap 10/14/2010       Exam Chest clear to auscultation and percussion.  Heart tones regular rhythm without murmur rub or gallop.  Abdomen soft nontender no organomegaly.  No peritoneal signs.  Extremities free of edema cyanosis or clubbing.  Neck veins nondistended no thyromegaly or scleral icterus noted, carotids full.  Skin warm and dry easily conversant good spirited.  Normal intelligence.  Neurologically intact no gross localizing findings.    Assessment and Plan  Hypertension controlled check TSH plus potassium level today.    Multiple drug allergies including amoxicillin and propoxyphene for Darvon.    Colon polyp by history see colonoscopy report September 29, 2017 allCLEAR.    Colon polyps.    Adenomatous goiter without evidence for hyper or hypothyroidism.    RTC as needed    Time: total time spent with the patient was 25 minutes of which >50% was spent in counseling and coordination of care        Kaiden Pruitt MD    Patient Active Problem List   Diagnosis     Polyposis Coli Of The Large Intestine     Adenomatous Goiter     Cataract     Essential Hypertension     Erythema Nodosum     Functional Murmur

## 2021-06-14 NOTE — TELEPHONE ENCOUNTER
RN cannot approve Refill Request    RN can NOT refill this medication PCP messaged that patient is overdue for Office Visit. Last office visit: 11/30/2017 Kaiden Pruitt MD Last Physical: 6/22/2017 Last MTM visit: Visit date not found Last visit same specialty: 11/30/2017 Kaiden Pruitt MD.  Next visit within 3 mo: Visit date not found  Next physical within 3 mo: Visit date not found      Nena Berumen, [facility] Triage/Med Refill 1/17/2021    Requested Prescriptions   Pending Prescriptions Disp Refills     KLOR-CON M20 20 mEq tablet [Pharmacy Med Name: POTASSIUM CHLORIDE ER (DISP) TABS 20MEQ] 90 tablet 3     Sig: TAKE 1 TABLET DAILY       Potassium Supplements Refill Protocol Failed - 1/17/2021 10:24 AM        Failed - PCP or prescribing provider visit in past 12 months       Last office visit with prescriber/PCP: 11/30/2017 Kaiden Pruitt MD OR same dept: Visit date not found OR same specialty: 11/30/2017 Kaiden Pruitt MD  Last physical: 6/22/2017 Last MTM visit: Visit date not found   Next visit within 3 mo: Visit date not found  Next physical within 3 mo: Visit date not found  Prescriber OR PCP: Kaiden Pruitt MD  Last diagnosis associated with med order: 1. HTN (hypertension)  - KLOR-CON M20 20 mEq tablet [Pharmacy Med Name: POTASSIUM CHLORIDE ER (DISP) TABS 20MEQ]; TAKE 1 TABLET DAILY  Dispense: 90 tablet; Refill: 3    If protocol passes may refill for 12 months if within 3 months of last provider visit (or a total of 15 months).             Failed - Potassium level in last 12 months     No results found for: LN-POTASSIUM
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
## 2021-06-21 NOTE — LETTER
Letter by Kaiden Pruitt MD at      Author: Kaiden Pruitt MD Service: -- Author Type: --    Filed:  Encounter Date: 3/26/2021 Status: (Other)         Kristin Almanzar  [address] [place] [address]             March 26, 2021         Dear Ms. Almanzar,    Below are the results from your recent visit:    Resulted Orders   Hemoglobin   Result Value Ref Range    Hemoglobin 14.1 12.0 - 16.0 g/dL   Potassium   Result Value Ref Range    Potassium 4.9 3.5 - 5.0 mmol/L       All very good results    Please call with questions or contact us using [facility].    Sincerely,        Electronically signed by Kaiden Pruitt MD

## 2021-06-24 NOTE — TELEPHONE ENCOUNTER
RN cannot approve Refill Request    RN can NOT refill this medication Protocol failed and NO refill given. Last office visit: 11/30/2017 Kaiden Pruitt MD Last Physical: 6/22/2017 Last MTM visit: Visit date not found Last visit same specialty: 11/30/2017 Kaiden Pruitt MD.  Next visit within 3 mo: Visit date not found  Next physical within 3 mo: Visit date not found      Elissa Parson, [facility] Triage/Med Refill 2/26/2019    Requested Prescriptions   Pending Prescriptions Disp Refills     quinapril (ACCUPRIL) 40 MG tablet [Pharmacy Med Name: QUINAPRIL TABS 40MG] 90 tablet 0     Sig: TAKE 1 TABLET AT BEDTIME    Ace Inhibitors Refill Protocol Failed - 2/25/2019 11:13 AM       Failed - PCP or prescribing provider visit in past 12 months      Last office visit with prescriber/PCP: 11/30/2017 Kaiden Pruitt MD OR same dept: Visit date not found OR same specialty: 11/30/2017 Kaiden Pruitt MD  Last physical: 6/22/2017 Last MTM visit: Visit date not found   Next visit within 3 mo: Visit date not found  Next physical within 3 mo: Visit date not found  Prescriber OR PCP: Kaiden Pruitt MD  Last diagnosis associated with med order: 1. Essential hypertension  - quinapril (ACCUPRIL) 40 MG tablet [Pharmacy Med Name: QUINAPRIL TABS 40MG]; TAKE 1 TABLET AT BEDTIME  Dispense: 90 tablet; Refill: 0    If protocol passes may refill for 12 months if within 3 months of last provider visit (or a total of 15 months).            Failed - Serum Potassium in past 12 months    No results found for: LN-POTASSIUM         Failed - Blood pressure filed in past 12 months    BP Readings from Last 1 Encounters:   11/30/17 126/68            Failed - Serum Creatinine in past 12 months    Creatinine   Date Value Ref Range Status   06/22/2017 0.74 0.60 - 1.10 mg/dL Final

## 2021-06-26 ENCOUNTER — HEALTH MAINTENANCE LETTER (OUTPATIENT)
Age: 66
End: 2021-06-26

## 2021-07-21 ENCOUNTER — RECORDS - HEALTHEAST (OUTPATIENT)
Dept: ADMINISTRATIVE | Facility: CLINIC | Age: 66
End: 2021-07-21

## 2021-07-27 ENCOUNTER — TRANSFERRED RECORDS (OUTPATIENT)
Dept: HEALTH INFORMATION MANAGEMENT | Facility: CLINIC | Age: 66
End: 2021-07-27

## 2021-08-16 ENCOUNTER — TRANSFERRED RECORDS (OUTPATIENT)
Dept: HEALTH INFORMATION MANAGEMENT | Facility: CLINIC | Age: 66
End: 2021-08-16

## 2021-08-19 ENCOUNTER — TRANSFERRED RECORDS (OUTPATIENT)
Dept: HEALTH INFORMATION MANAGEMENT | Facility: CLINIC | Age: 66
End: 2021-08-19

## 2021-10-16 ENCOUNTER — HEALTH MAINTENANCE LETTER (OUTPATIENT)
Age: 66
End: 2021-10-16

## 2021-10-18 DIAGNOSIS — I10 ESSENTIAL HYPERTENSION: ICD-10-CM

## 2021-10-18 RX ORDER — ATENOLOL AND CHLORTHALIDONE TABLET 50; 25 MG/1; MG/1
1 TABLET ORAL DAILY
Qty: 90 TABLET | Refills: 3 | Status: SHIPPED | OUTPATIENT
Start: 2021-10-18 | End: 2022-10-17

## 2021-11-15 DIAGNOSIS — I10 ESSENTIAL HYPERTENSION: ICD-10-CM

## 2021-11-15 RX ORDER — QUINAPRIL 40 MG/1
40 TABLET ORAL AT BEDTIME
Qty: 90 TABLET | Refills: 4 | Status: SHIPPED | OUTPATIENT
Start: 2021-11-15 | End: 2023-02-13

## 2022-01-23 DIAGNOSIS — I10 HTN (HYPERTENSION): ICD-10-CM

## 2022-01-25 RX ORDER — POTASSIUM CHLORIDE 1500 MG/1
20 TABLET, EXTENDED RELEASE ORAL DAILY
Qty: 90 TABLET | Refills: 0 | Status: SHIPPED | OUTPATIENT
Start: 2022-01-25 | End: 2022-04-28

## 2022-01-25 NOTE — TELEPHONE ENCOUNTER
"Last Written Prescription Date:  1/18/21  Last Fill Quantity: 90,  # refills: 3   Last office visit provider:  3/25/21     Requested Prescriptions   Pending Prescriptions Disp Refills     KLOR-CON 20 MEQ CR tablet [Pharmacy Med Name: POTASSIUM CHLORIDE ER (DISP) TABS 20MEQ] 90 tablet 3     Sig: TAKE 1 TABLET DAILY       Potassium Supplements Protocol Passed - 1/23/2022  9:26 AM        Passed - Recent (12 mo) or future (30 days) visit within the authorizing provider's department     Patient has had an office visit with the authorizing provider or a provider within the authorizing providers department within the previous 12 mos or has a future within next 30 days. See \"Patient Info\" tab in inbasket, or \"Choose Columns\" in Meds & Orders section of the refill encounter.              Passed - Medication is active on med list        Passed - Patient is age 18 or older        Passed - Normal serum potassium in past 12 months     Recent Labs   Lab Test 03/25/21  1442   POTASSIUM 4.9                         Hoang Valladares RN 01/25/22 11:41 AM  "

## 2022-02-07 ENCOUNTER — TRANSFERRED RECORDS (OUTPATIENT)
Dept: HEALTH INFORMATION MANAGEMENT | Facility: CLINIC | Age: 67
End: 2022-02-07
Payer: COMMERCIAL

## 2022-04-25 DIAGNOSIS — I10 HTN (HYPERTENSION): ICD-10-CM

## 2022-04-25 NOTE — TELEPHONE ENCOUNTER
Received fax from pharmacy requesting refill for potassium chloride ER (KLOR-CON) 20 MEQ CR tablet    Last refill: 01/25/2022  Patient last seen: 03/25/2021    Okay for refill?

## 2022-04-27 ENCOUNTER — MYC REFILL (OUTPATIENT)
Dept: INTERNAL MEDICINE | Facility: CLINIC | Age: 67
End: 2022-04-27
Payer: COMMERCIAL

## 2022-04-27 DIAGNOSIS — I10 HTN (HYPERTENSION): ICD-10-CM

## 2022-04-27 RX ORDER — POTASSIUM CHLORIDE 1500 MG/1
20 TABLET, EXTENDED RELEASE ORAL DAILY
Qty: 90 TABLET | Refills: 0 | Status: CANCELLED | OUTPATIENT
Start: 2022-04-27

## 2022-04-27 NOTE — TELEPHONE ENCOUNTER
"Routing refill request to provider for review/approval because:  Labs not current:  K+  Patient needs to be seen because it has been more than 1 year since last office visit.    Last Written Prescription Date:  1/25/22  Last Fill Quantity: 90,  # refills: 0   Last office visit provider:  3/25/21     Requested Prescriptions   Pending Prescriptions Disp Refills     potassium chloride ER (KLOR-CON) 20 MEQ CR tablet 90 tablet 0     Sig: Take 1 tablet (20 mEq) by mouth daily       Potassium Supplements Protocol Failed - 4/27/2022  2:40 PM        Failed - Recent (12 mo) or future (30 days) visit within the authorizing provider's department     Patient has had an office visit with the authorizing provider or a provider within the authorizing providers department within the previous 12 mos or has a future within next 30 days. See \"Patient Info\" tab in inbasket, or \"Choose Columns\" in Meds & Orders section of the refill encounter.              Failed - Normal serum potassium in past 12 months     Recent Labs   Lab Test 03/25/21  1442   POTASSIUM 4.9                    Passed - Medication is active on med list        Passed - Patient is age 18 or older             Hoang Valladares RN 04/27/22 2:41 PM  "

## 2022-04-28 RX ORDER — POTASSIUM CHLORIDE 1500 MG/1
20 TABLET, EXTENDED RELEASE ORAL DAILY
Qty: 90 TABLET | Refills: 0 | Status: SHIPPED | OUTPATIENT
Start: 2022-04-28 | End: 2022-07-31

## 2022-04-29 NOTE — TELEPHONE ENCOUNTER
Medication was sent to pharmacy:  E-Prescribing Status: Receipt confirmed by pharmacy (4/28/2022  8:30 AM CDT)    Darren Espino Jr., CMA on 4/29/2022 at 1:41 PM

## 2022-06-13 ENCOUNTER — ANCILLARY PROCEDURE (OUTPATIENT)
Dept: MAMMOGRAPHY | Facility: CLINIC | Age: 67
End: 2022-06-13
Attending: INTERNAL MEDICINE
Payer: COMMERCIAL

## 2022-06-13 DIAGNOSIS — Z12.31 VISIT FOR SCREENING MAMMOGRAM: ICD-10-CM

## 2022-06-13 PROCEDURE — 77067 SCR MAMMO BI INCL CAD: CPT

## 2022-06-14 ENCOUNTER — TELEPHONE (OUTPATIENT)
Dept: INTERNAL MEDICINE | Facility: CLINIC | Age: 67
End: 2022-06-14
Payer: COMMERCIAL

## 2022-06-14 NOTE — TELEPHONE ENCOUNTER
Talked with Kristin and she has a follow up appointment for an US and additional views on June 29th

## 2022-06-14 NOTE — TELEPHONE ENCOUNTER
----- Message from Kaiden Pruitt MD sent at 6/13/2022 11:14 AM CDT -----  Please call patient and tell her there is a possible mass in the left breast and additional x-rays and ultrasound may be necessary to do.  The patient should contact the mammographic center to schedule this as soon as possible.  Please call patient as soon as possible today.

## 2022-06-16 ASSESSMENT — ENCOUNTER SYMPTOMS
SORE THROAT: 0
HEMATOCHEZIA: 0
WEAKNESS: 0
DIZZINESS: 0
HEMATURIA: 0
CONSTIPATION: 0
ABDOMINAL PAIN: 0
JOINT SWELLING: 0
PALPITATIONS: 0
HEADACHES: 0
COUGH: 0
FEVER: 0
ARTHRALGIAS: 1
FREQUENCY: 0
SHORTNESS OF BREATH: 0
BREAST MASS: 0
CHILLS: 0
NERVOUS/ANXIOUS: 0
MYALGIAS: 0
DIARRHEA: 0
HEARTBURN: 0
NAUSEA: 0
EYE PAIN: 0
DYSURIA: 0
PARESTHESIAS: 0

## 2022-06-16 ASSESSMENT — ACTIVITIES OF DAILY LIVING (ADL): CURRENT_FUNCTION: NO ASSISTANCE NEEDED

## 2022-06-20 ENCOUNTER — OFFICE VISIT (OUTPATIENT)
Dept: INTERNAL MEDICINE | Facility: CLINIC | Age: 67
End: 2022-06-20
Payer: COMMERCIAL

## 2022-06-20 VITALS
OXYGEN SATURATION: 98 % | RESPIRATION RATE: 17 BRPM | WEIGHT: 160 LBS | TEMPERATURE: 98.3 F | HEIGHT: 68 IN | BODY MASS INDEX: 24.25 KG/M2 | HEART RATE: 55 BPM | DIASTOLIC BLOOD PRESSURE: 84 MMHG | SYSTOLIC BLOOD PRESSURE: 152 MMHG

## 2022-06-20 DIAGNOSIS — Z00.00 ROUTINE GENERAL MEDICAL EXAMINATION AT A HEALTH CARE FACILITY: Primary | ICD-10-CM

## 2022-06-20 LAB
ALBUMIN SERPL-MCNC: 4.2 G/DL (ref 3.5–5)
ALBUMIN UR-MCNC: NEGATIVE MG/DL
ALP SERPL-CCNC: 47 U/L (ref 45–120)
ALT SERPL W P-5'-P-CCNC: 15 U/L (ref 0–45)
ANION GAP SERPL CALCULATED.3IONS-SCNC: 7 MMOL/L (ref 5–18)
APPEARANCE UR: CLEAR
AST SERPL W P-5'-P-CCNC: 14 U/L (ref 0–40)
BILIRUB SERPL-MCNC: 0.6 MG/DL (ref 0–1)
BILIRUB UR QL STRIP: NEGATIVE
BUN SERPL-MCNC: 13 MG/DL (ref 8–22)
CALCIUM SERPL-MCNC: 9.9 MG/DL (ref 8.5–10.5)
CHLORIDE BLD-SCNC: 100 MMOL/L (ref 98–107)
CHOLEST SERPL-MCNC: 198 MG/DL
CO2 SERPL-SCNC: 28 MMOL/L (ref 22–31)
COLOR UR AUTO: YELLOW
CREAT SERPL-MCNC: 0.66 MG/DL (ref 0.6–1.1)
ERYTHROCYTE [DISTWIDTH] IN BLOOD BY AUTOMATED COUNT: 12.1 % (ref 10–15)
FASTING STATUS PATIENT QL REPORTED: NO
GFR SERPL CREATININE-BSD FRML MDRD: >90 ML/MIN/1.73M2
GLUCOSE BLD-MCNC: 100 MG/DL (ref 70–125)
GLUCOSE UR STRIP-MCNC: NEGATIVE MG/DL
HCT VFR BLD AUTO: 39.3 % (ref 35–47)
HDLC SERPL-MCNC: 64 MG/DL
HGB BLD-MCNC: 13.8 G/DL (ref 11.7–15.7)
HGB UR QL STRIP: NEGATIVE
KETONES UR STRIP-MCNC: NEGATIVE MG/DL
LDLC SERPL CALC-MCNC: 125 MG/DL
LEUKOCYTE ESTERASE UR QL STRIP: NEGATIVE
MCH RBC QN AUTO: 32.1 PG (ref 26.5–33)
MCHC RBC AUTO-ENTMCNC: 35.1 G/DL (ref 31.5–36.5)
MCV RBC AUTO: 91 FL (ref 78–100)
NITRATE UR QL: NEGATIVE
PH UR STRIP: 7 [PH] (ref 5–8)
PLATELET # BLD AUTO: 251 10E3/UL (ref 150–450)
POTASSIUM BLD-SCNC: 3.9 MMOL/L (ref 3.5–5)
PROT SERPL-MCNC: 6.7 G/DL (ref 6–8)
RBC # BLD AUTO: 4.3 10E6/UL (ref 3.8–5.2)
SODIUM SERPL-SCNC: 135 MMOL/L (ref 136–145)
SP GR UR STRIP: 1.02 (ref 1–1.03)
TRIGL SERPL-MCNC: 45 MG/DL
TSH SERPL DL<=0.005 MIU/L-ACNC: 0.64 UIU/ML (ref 0.3–5)
UROBILINOGEN UR STRIP-ACNC: 0.2 E.U./DL
WBC # BLD AUTO: 5.8 10E3/UL (ref 4–11)

## 2022-06-20 PROCEDURE — 80061 LIPID PANEL: CPT | Performed by: INTERNAL MEDICINE

## 2022-06-20 PROCEDURE — 99397 PER PM REEVAL EST PAT 65+ YR: CPT | Performed by: INTERNAL MEDICINE

## 2022-06-20 PROCEDURE — 84443 ASSAY THYROID STIM HORMONE: CPT | Performed by: INTERNAL MEDICINE

## 2022-06-20 PROCEDURE — 85027 COMPLETE CBC AUTOMATED: CPT | Performed by: INTERNAL MEDICINE

## 2022-06-20 PROCEDURE — 81003 URINALYSIS AUTO W/O SCOPE: CPT | Performed by: INTERNAL MEDICINE

## 2022-06-20 PROCEDURE — 36415 COLL VENOUS BLD VENIPUNCTURE: CPT | Performed by: INTERNAL MEDICINE

## 2022-06-20 PROCEDURE — 80053 COMPREHEN METABOLIC PANEL: CPT | Performed by: INTERNAL MEDICINE

## 2022-06-20 ASSESSMENT — ENCOUNTER SYMPTOMS
HEMATURIA: 0
HEADACHES: 0
COUGH: 0
CHILLS: 0
CONSTIPATION: 0
PARESTHESIAS: 0
NAUSEA: 0
JOINT SWELLING: 0
FEVER: 0
SHORTNESS OF BREATH: 0
DIZZINESS: 0
ABDOMINAL PAIN: 0
BREAST MASS: 0
FREQUENCY: 0
ARTHRALGIAS: 1
DYSURIA: 0
HEMATOCHEZIA: 0
EYE PAIN: 0
MYALGIAS: 0
DIARRHEA: 0
WEAKNESS: 0
HEARTBURN: 0
SORE THROAT: 0
NERVOUS/ANXIOUS: 0
PALPITATIONS: 0

## 2022-06-20 ASSESSMENT — ACTIVITIES OF DAILY LIVING (ADL): CURRENT_FUNCTION: NO ASSISTANCE NEEDED

## 2022-06-20 ASSESSMENT — PAIN SCALES - GENERAL
PAINLEVEL: NO PAIN (0)
PAINLEVEL: NO PAIN (0)

## 2022-06-20 NOTE — PROGRESS NOTES
"SUBJECTIVE:   Kristin Almanzar is a 66 year old female who presents for Preventive Visit.    {  Patient has been advised of split billing requirements and indicates understanding: Yes  Are you in the first 12 months of your Medicare coverage?  No    Healthy Habits:     In general, how would you rate your overall health?  Good    Frequency of exercise:  2-3 days/week    Duration of exercise:  15-30 minutes    Do you usually eat at least 4 servings of fruit and vegetables a day, include whole grains    & fiber and avoid regularly eating high fat or \"junk\" foods?  Yes    Taking medications regularly:  Yes    Medication side effects:  None    Ability to successfully perform activities of daily living:  No assistance needed    Home Safety:  Lack of grab bars in the bathroom    Hearing Impairment:  Difficulty following a conversation in a noisy restaurant or crowded room    In the past 6 months, have you been bothered by leaking of urine?  No    In general, how would you rate your overall mental or emotional health?  Excellent      PHQ-2 Total Score: 0    Additional concerns today:  No    Do you feel safe in your environment? Yes    Have you ever done Advance Care Planning? (For example, a Health Directive, POLST, or a discussion with a medical provider or your loved ones about your wishes): Yes, patient states has an Advance Care Planning document and will bring a copy to the clinic.       Fall risk  none    {If any of the above assessments are answered yes, consider ordering appropriate referrals (Optional):188644::\"click delete button to remove this line now\"}  Cognitive Screening  Clock   Two   Words  Picture  Garden and Captain    {Do you have sleep apnea, excessive snoring or daytime drowsiness? (Optional):121556}    Reviewed and updated as needed this visit by clinical staff   Tobacco  Allergies                 Reviewed and updated as needed this visit by Provider                   Social History     Tobacco Use "     Smoking status: Never Smoker     Smokeless tobacco: Never Used   Substance Use Topics     Alcohol use: Not Currently     Comment: Alcoholic Drinks/day: rare     {Rooming Staff- Complete this question if Prescreen response is not shown below for today's visit. If you drink alcohol do you typically have >3 drinks per day or >7 drinks per week? (Optional):362809}    Alcohol Use 6/16/2022   Prescreen: >3 drinks/day or >7 drinks/week? Not Applicable   {add AUDIT responses (Optional) (A score of 7 for adult men is an indication of hazardous drinking; a score of 8 or more is an indication of an alcohol use disorder.  A score of 7 or more for adult women is an indication of hazardous drinking or an alchohol use disorder):150207}    {Outside tests to abstract? :039461}    {additional problems to add (Optional):790206}    Current providers sharing in care for this patient include: {Rooming staff:  Please update Care Team in Rooming Activity, refresh this note and then delete this statement}  Patient Care Team:  Kaiden Pruitt MD as PCP - General  Kaiden Pruitt MD as Assigned PCP    The following health maintenance items are reviewed in Epic and correct as of today:  Health Maintenance Due   Topic Date Due     ANNUAL REVIEW OF HM ORDERS  Never done     ADVANCE CARE PLANNING  Never done     HEPATITIS C SCREENING  Never done     ZOSTER IMMUNIZATION (1 of 2) Never done     LIPID  07/24/2020     MEDICARE ANNUAL WELLNESS VISIT  10/10/2020     Pneumococcal Vaccine: 65+ Years (1 - PCV) Never done     {Chronicprobdata (optional):102466}  {Decision Support (Optional):075285}    FHS-7:   Breast CA Risk Assessment (FHS-7) 6/13/2022 6/16/2022   Did any of your first-degree relatives have breast or ovarian cancer? Yes Yes   Did any of your relatives have bilateral breast cancer? No No   Did any man in your family have breast cancer? No No   Did any woman in your family have breast and ovarian cancer? No Yes   Did any woman  "in your family have breast cancer before age 50 y? Yes Yes   Do you have 2 or more relatives with breast and/or ovarian cancer? No No   Do you have 2 or more relatives with breast and/or bowel cancer? No No     {If any of the questions to the BCRA (FHS-7) are answered yes, consider ordering referral for genetic counseling (Optional) :524693::\"click delete button to remove this line now\"}  {AMB Mammogram Decision Support (Optional) :980933}  Pertinent mammograms are reviewed under the imaging tab.    Review of Systems   Constitutional: Negative for chills and fever.   HENT: Negative for congestion, ear pain, hearing loss and sore throat.    Eyes: Negative for pain and visual disturbance.   Respiratory: Negative for cough and shortness of breath.    Cardiovascular: Negative for chest pain, palpitations and peripheral edema.   Gastrointestinal: Negative for abdominal pain, constipation, diarrhea, heartburn, hematochezia and nausea.   Breasts:  Negative for tenderness, breast mass and discharge.   Genitourinary: Negative for dysuria, frequency, genital sores, hematuria, pelvic pain, urgency, vaginal bleeding and vaginal discharge.   Musculoskeletal: Positive for arthralgias. Negative for joint swelling and myalgias.   Skin: Negative for rash.   Neurological: Negative for dizziness, weakness, headaches and paresthesias.   Psychiatric/Behavioral: Negative for mood changes. The patient is not nervous/anxious.      {ROS COMP (Optional):551232}    OBJECTIVE:   BP (!) 152/84   Pulse 55   Temp 98.3  F (36.8  C)   Resp 17   Ht 1.715 m (5' 7.5\")   Wt 72.6 kg (160 lb)   SpO2 98%   BMI 24.69 kg/m   Estimated body mass index is 24.69 kg/m  as calculated from the following:    Height as of this encounter: 1.715 m (5' 7.5\").    Weight as of this encounter: 72.6 kg (160 lb).  Physical Exam  {Exam (Optional) :562080}    {Diagnostic Test Results (Optional):541466::\"Diagnostic Test Results:\",\"Labs reviewed in Epic\"}    ASSESSMENT " "/ PLAN:   {Dia Picklist:086090}    {Patient advised of split billing (Optional):528521}    COUNSELING:  {Medicare Counselin}    Estimated body mass index is 24.69 kg/m  as calculated from the following:    Height as of this encounter: 1.715 m (5' 7.5\").    Weight as of this encounter: 72.6 kg (160 lb).    {Weight Management Plan (ACO) Complete if BMI is abnormal-  Ages 18-64  BMI >24.9.  Age 65+ with BMI <23 or >30 (Optional):349033}    She reports that she has never smoked. She has never used smokeless tobacco.      Appropriate preventive services were discussed with this patient, including applicable screening as appropriate for cardiovascular disease, diabetes, osteopenia/osteoporosis, and glaucoma.  As appropriate for age/gender, discussed screening for colorectal cancer, prostate cancer, breast cancer, and cervical cancer. Checklist reviewing preventive services available has been given to the patient.    Reviewed patients plan of care and provided an AVS. The {CarePlan:533033} for Kristin meets the Care Plan requirement. This Care Plan has been established and reviewed with the {PATIENT, FAMILY MEMBER, CAREGIVER:003054}.    Counseling Resources:  ATP IV Guidelines  Pooled Cohorts Equation Calculator  Breast Cancer Risk Calculator  Breast Cancer: Medication to Reduce Risk  FRAX Risk Assessment  ICSI Preventive Guidelines  Dietary Guidelines for Americans,   [employer]'s MyPlate  ASA Prophylaxis  Lung CA Screening    Kaiden Pruitt MD  [facility]    Identified Health Risks:  "

## 2022-06-20 NOTE — LETTER
June 20, 2022      Kristin Almanzar  [address] [place] [address]        Dear ,    We are writing to inform you of your test results.      All very good!  Normal thyroid function.    Resulted Orders   CBC with platelets   Result Value Ref Range    WBC Count 5.8 4.0 - 11.0 10e3/uL    RBC Count 4.30 3.80 - 5.20 10e6/uL    Hemoglobin 13.8 11.7 - 15.7 g/dL    Hematocrit 39.3 35.0 - 47.0 %    MCV 91 78 - 100 fL    MCH 32.1 26.5 - 33.0 pg    MCHC 35.1 31.5 - 36.5 g/dL    RDW 12.1 10.0 - 15.0 %    Platelet Count 251 150 - 450 10e3/uL   Comprehensive metabolic panel   Result Value Ref Range    Sodium 135 (L) 136 - 145 mmol/L    Potassium 3.9 3.5 - 5.0 mmol/L    Chloride 100 98 - 107 mmol/L    Carbon Dioxide (CO2) 28 22 - 31 mmol/L    Anion Gap 7 5 - 18 mmol/L    Urea Nitrogen 13 8 - 22 mg/dL    Creatinine 0.66 0.60 - 1.10 mg/dL    Calcium 9.9 8.5 - 10.5 mg/dL    Glucose 100 70 - 125 mg/dL    Alkaline Phosphatase 47 45 - 120 U/L    AST 14 0 - 40 U/L    ALT 15 0 - 45 U/L    Protein Total 6.7 6.0 - 8.0 g/dL    Albumin 4.2 3.5 - 5.0 g/dL    Bilirubin Total 0.6 0.0 - 1.0 mg/dL    GFR Estimate >90 >60 mL/min/1.73m2      Comment:      Effective December 21, 2021 eGFRcr in adults is calculated using the 2021 CKD-EPI creatinine equation which includes age and gender (Jeffrey rashid al., NEJM, DOI: 10.1056/UPSDby5228997)   UA reflex to Microscopic and Culture   Result Value Ref Range    Color Urine Yellow Colorless, Straw, Light Yellow, Yellow    Appearance Urine Clear Clear    Glucose Urine Negative Negative mg/dL    Bilirubin Urine Negative Negative    Ketones Urine Negative Negative mg/dL    Specific Gravity Urine 1.020 1.005 - 1.030    Blood Urine Negative Negative    pH Urine 7.0 5.0 - 8.0    Protein Albumin Urine Negative Negative mg/dL    Urobilinogen Urine 0.2 0.2, 1.0 E.U./dL    Nitrite Urine Negative Negative    Leukocyte Esterase Urine Negative Negative    Narrative    Microscopic not indicated   TSH    Result Value Ref Range    TSH 0.64 0.30 - 5.00 uIU/mL   Lipid panel reflex to direct LDL Fasting   Result Value Ref Range    Cholesterol 198 <=199 mg/dL    Triglycerides 45 <=149 mg/dL    Direct Measure HDL 64 >=50 mg/dL      Comment:      HDL Cholesterol Reference Range:     0-2 years:   No reference ranges established for patients under 2 years old  at [facility] for lipid analytes.    2-8 years:  Greater than 45 mg/dL     18 years and older:   Female: Greater than or equal to 50 mg/dL   Male:   Greater than or equal to 40 mg/dL    LDL Cholesterol Calculated 125 <=129 mg/dL    Patient Fasting > 8hrs? No        If you have any questions or concerns, please call the clinic at the number listed above.       Sincerely,      Kaiden Pruitt MD

## 2022-06-20 NOTE — PROGRESS NOTES
Annual Wellness Visit:  Kristin Almanzar  is a 66 year old female  who presents for an annual wellness visit.  Annual wellness visit welcome to Medicare.  Today advance care planning was done along with the falls risk assessment cognitive assessment and the current provider this examiner and patient sharing was also accomplished.  Today check hemogram comprehensive metabolic profile urinalysis lipid panel and TSH.  Recent mammogram done 2022 additional studies are pending 2022 questions about left breast.    Colonoscopy last done 2017 Dr. [facility] normal postsurgical anatomy right side prior right sided colectomy for dysplastic polyps needs close follow-up encourage patient with Dr. [facility] for follow-up.  Father this patient  of colon cancer.  81.    Assessment/Plan:  Annual wellness visit and physical examination completed.    Subjective:   Medical History:    Non-smoker no excess alcohol very little alcohol.  Allergies amoxicillin and propoxyphene for Darvon Compound.  Eye appointment slated for July 15, 2021.  See that report for details regarding visual acuity and eye exam.  Partial colectomy right side colon polyps dysplastic.  No anupam cancer.    Hysterectomy benign disease bilateral cataract surgery.  Longstanding hypertension without target organ damage related to same.  Past Medical History:   Diagnosis Date     History of anesthesia complications     vinethane - slow to wake up     Hypertension      PONV (postoperative nausea and vomiting)     as a child had vinethane slow to awake     Current Outpatient Medications   Medication     atenolol-chlorthalidone (TENORETIC) 50-25 MG tablet     potassium chloride ER (KLOR-CON) 20 MEQ CR tablet     quinapril (ACCUPRIL) 40 MG tablet     No current facility-administered medications for this visit.     Immunization History   Administered Date(s) Administered     COVID-19,PF,Pfizer (12+ Yrs)  "2021, 2021, 2021, 10/26/2021     COVID-19,PF,Pfizer 12+ Yrs ( and After) 2022     DT (PEDS <7y) 1999     Flu, Unspecified 10/26/2020     Td,adult,historic,unspecified 1999     Tdap (Adacel,Boostrix) 10/14/2010, 2020       Surgical History:  Past Surgical History:   Procedure Laterality Date     APPENDECTOMY       COLON SURGERY Right     Colectomy - Large polyp     COLPORRHAPHY N/A 2017    Procedure:  UTEROSACRAL LIGAMENT SUSPENSION CYSTOSCOPY;  Surgeon: Nancy Girard MD;  Location: [facility];  Service:      EYE SURGERY Left     Cataract     HYSTERECTOMY  2017     HYSTERECTOMY VAGINAL N/A 2017    Procedure: VAGINAL HYSTERECTOMY;  Surgeon: Nancy Girard MD;  Location: [facility];  Service:         Family History:  Mother  86 central nervous system bleed cerebral.  Long history of hypertension.    Father  81 colon cancer.  2 children well and 2 grandchildren well  also patient of this examiner with history of type 2 diabetes.    Social History:  Enjoys family.  Exercises regularly.  BMI is ideal at 24.69.    Health Maintenances:  Immunizations are reviewed and up-to-date she has received for COVID-19 preventive vaccines including 2 of which were boosters.  OB/GYN check with Dr. Nancy Damon not repeated by this examiner to include breast pelvic rectal exam Pap smear although patient has had a hysterectomy.    Objective:  BP (!) 152/84   Pulse 55   Temp 98.3  F (36.8  C)   Resp 17   Ht 1.715 m (5' 7.5\")   Wt 72.6 kg (160 lb)   SpO2 98%   BMI 24.69 kg/m    Skin negative lymph negative neuro negative psych normal easily conversant good spirited intelligent.  Not anxious.  Appears younger than stated age neuromuscular tone is excellent back straight no severe spine tenderness chest clear heart tones normal abdomen benign neck veins are nondistended there is a thyroid enlargement and goiter unchanged no nodularity " "small.  Good pulse on all 4 extremities abdomen benign breast pelvic rectal examination with Pap smear done by gynecologist Dr. Nancy Damon not repeated by this examiner.    Kaiden Pruitt MD    Internal Medicine  Answers for HPI/ROS submitted by the patient on 6/16/2022  In general, how would you rate your overall physical health?: good  Frequency of exercise:: 2-3 days/week  Do you usually eat at least 4 servings of fruit and vegetables a day, include whole grains & fiber, and avoid regularly eating high fat or \"junk\" foods? : Yes  Taking medications regularly:: Yes  Medication side effects:: None  Activities of Daily Living: no assistance needed  Home safety: lack of grab bars in the bathroom  Hearing Impairment:: difficulty following a conversation in a noisy restaurant or crowded room  In the past 6 months, have you been bothered by leaking of urine?: No  abdominal pain: No  Blood in stool: No  Blood in urine: No  chest pain: No  chills: No  congestion: No  constipation: No  cough: No  diarrhea: No  dizziness: No  ear pain: No  eye pain: No  nervous/anxious: No  fever: No  frequency: No  genital sores: No  headaches: No  hearing loss: No  heartburn: No  arthralgias: Yes  joint swelling: No  peripheral edema: No  mood changes: No  myalgias: No  nausea: No  dysuria: No  palpitations: No  Skin sensation changes: No  sore throat: No  urgency: No  rash: No  shortness of breath: No  visual disturbance: No  weakness: No  pelvic pain: No  vaginal bleeding: No  vaginal discharge: No  tenderness: No  breast mass: No  breast discharge: No  In general, how would you rate your overall mental or emotional health?: excellent  Additional concerns today:: No  Duration of exercise:: 15-30 minutes      "

## 2022-06-29 ENCOUNTER — ANCILLARY PROCEDURE (OUTPATIENT)
Dept: MAMMOGRAPHY | Facility: CLINIC | Age: 67
End: 2022-06-29
Attending: INTERNAL MEDICINE
Payer: COMMERCIAL

## 2022-06-29 ENCOUNTER — DOCUMENTATION ONLY (OUTPATIENT)
Dept: SURGERY | Facility: CLINIC | Age: 67
End: 2022-06-29

## 2022-06-29 DIAGNOSIS — N64.89 BREAST ASYMMETRY: ICD-10-CM

## 2022-06-29 PROCEDURE — 77061 BREAST TOMOSYNTHESIS UNI: CPT | Mod: LT

## 2022-06-29 PROCEDURE — 76642 ULTRASOUND BREAST LIMITED: CPT | Mod: LT

## 2022-06-29 NOTE — PROGRESS NOTES
Per Breast Radiologist request, scheduled patient to see Dr. Bashir  for a surgical consult on Monday, 7-18-22 at the [facility].  Patient given RN contact information for questions or concerns.

## 2022-07-11 ENCOUNTER — ANCILLARY PROCEDURE (OUTPATIENT)
Dept: MAMMOGRAPHY | Facility: CLINIC | Age: 67
End: 2022-07-11
Attending: INTERNAL MEDICINE
Payer: COMMERCIAL

## 2022-07-11 ENCOUNTER — TRANSFERRED RECORDS (OUTPATIENT)
Dept: HEALTH INFORMATION MANAGEMENT | Facility: CLINIC | Age: 67
End: 2022-07-11

## 2022-07-11 DIAGNOSIS — N63.20 LEFT BREAST MASS: ICD-10-CM

## 2022-07-11 DIAGNOSIS — N64.89 BREAST ASYMMETRY: ICD-10-CM

## 2022-07-11 DIAGNOSIS — R92.8 OTHER ABNORMAL AND INCONCLUSIVE FINDINGS ON DIAGNOSTIC IMAGING OF BREAST: ICD-10-CM

## 2022-07-11 PROCEDURE — 88305 TISSUE EXAM BY PATHOLOGIST: CPT | Mod: 26 | Performed by: PATHOLOGY

## 2022-07-11 PROCEDURE — 999N000065 MA POST PROCEDURE LEFT

## 2022-07-11 PROCEDURE — 250N000009 HC RX 250: Performed by: INTERNAL MEDICINE

## 2022-07-11 PROCEDURE — 272N000431 US BREAST BIOPSY CORE NEEDLE LEFT

## 2022-07-11 PROCEDURE — 88360 TUMOR IMMUNOHISTOCHEM/MANUAL: CPT | Mod: 26 | Performed by: PATHOLOGY

## 2022-07-11 PROCEDURE — 88341 IMHCHEM/IMCYTCHM EA ADD ANTB: CPT | Mod: 26 | Performed by: PATHOLOGY

## 2022-07-11 PROCEDURE — 88305 TISSUE EXAM BY PATHOLOGIST: CPT | Mod: TC | Performed by: INTERNAL MEDICINE

## 2022-07-11 PROCEDURE — 88342 IMHCHEM/IMCYTCHM 1ST ANTB: CPT | Mod: 26 | Performed by: PATHOLOGY

## 2022-07-11 RX ADMIN — LIDOCAINE HYDROCHLORIDE 10 ML: 10 INJECTION, SOLUTION EPIDURAL; INFILTRATION; INTRACAUDAL; PERINEURAL at 14:42

## 2022-07-13 LAB
PATH REPORT.COMMENTS IMP SPEC: ABNORMAL
PATH REPORT.COMMENTS IMP SPEC: YES
PATH REPORT.FINAL DX SPEC: ABNORMAL
PATH REPORT.GROSS SPEC: ABNORMAL
PATH REPORT.MICROSCOPIC SPEC OTHER STN: ABNORMAL
PATH REPORT.RELEVANT HX SPEC: ABNORMAL
PHOTO IMAGE: ABNORMAL

## 2022-07-14 ENCOUNTER — TELEPHONE (OUTPATIENT)
Dept: MAMMOGRAPHY | Facility: CLINIC | Age: 67
End: 2022-07-14

## 2022-07-14 NOTE — TELEPHONE ENCOUNTER
Pathology report was reviewed with our Breast Center Radiologist,Dr. Martinez, who confirmed the recent breast imaging is concordant with the final surgical pathology results.    I phoned patient, confirmed her full name, date of birth, and notified patient of the following breast biopsy results:      BREAST BIOPSY CONTAINING NEOPLASTIC LESION:        -  BIOPSY TYPE: ULTRASOUND-GUIDED CORE BIOPSIES        -  BIOPSY SITE: LEFT BREAST, 1 O'CLOCK POSITION, 3 CM FROM NIPPLE        -  HISTOLOGIC TYPE: INVASIVE DUCTAL CARCINOMA        -  HISTOLOGIC GRADE (NITZA HISTOLOGIC SCORE):              -  TUBULE FORMATION SCORE 2              -  NUCLEAR PLEOMORPHISM SCORE 2              -  MITOTIC RATE SCORE 1              -  OVERALL GRADE 1 (TOTAL SCORE 5/9)        -  DUCTAL CARCINOMA IN SITU (DCIS): SCATTERED SMALL FOCI IDENTIFIED (SOLID PATTERN,                INTERMEDIATE NUCLEAR GRADE, NO NECROSIS)        -  INVASIVE TUMOR OCCUPIES ENTIRE LENGTH OF EACH OF 3 BIOPSY CORES        -  MICROCALCIFICATIONS: NUMEROUS, ASSOCIATED WITH TUMOR EPITHELIUM        -  SMALL-VESSEL LYMPHATIC INVASION: NOT IDENTIFIED        -  ADDITIONAL FINDINGS: NONE        -  ADDITIONAL STUDIES:              -  ESTROGEN RECEPTORS POSITIVE (80% OF TUMOR NUCLEI STAIN STRONGLY)              -  PROGESTERONE RECEPTORS POSITIVE (90% OF TUMOR NUCLEI STAIN STRONGLY)              -  HER2/PRAVEEN RECEPTORS NEGATIVE FOR OVEREXPRESSION (1+ MEMBRANE STAINING)       Per Breast Center Radiologist, I have assisted scheduling patient for the following:    Breast Surgeon Consult:  Dr. Allen 7/19/22   [facility] [place]  [address], [address]   [place] [address] [phone]      Questions were answered and support provided.  Patient states that her PCP also called her to report the results.  New diagnosis information packet will be available for patient at surgical consult.  She has my phone number if she has further questions.  Patient  verbalized understanding and agrees with the plan of care.    Ordering provider  has been notified of the results and plan.       Carina Silvestre, RN, BSN, PHN, CBCN  Breast Center Imaging Nurse Coordinator  [facility]  [phone]

## 2022-07-19 ENCOUNTER — OFFICE VISIT (OUTPATIENT)
Dept: SURGERY | Facility: CLINIC | Age: 67
End: 2022-07-19
Attending: INTERNAL MEDICINE
Payer: COMMERCIAL

## 2022-07-19 VITALS — WEIGHT: 158 LBS | BODY MASS INDEX: 24.38 KG/M2

## 2022-07-19 DIAGNOSIS — Z17.0 MALIGNANT NEOPLASM OF UPPER-OUTER QUADRANT OF LEFT BREAST IN FEMALE, ESTROGEN RECEPTOR POSITIVE (H): Primary | ICD-10-CM

## 2022-07-19 DIAGNOSIS — C50.412 MALIGNANT NEOPLASM OF UPPER-OUTER QUADRANT OF LEFT BREAST IN FEMALE, ESTROGEN RECEPTOR POSITIVE (H): Primary | ICD-10-CM

## 2022-07-19 PROCEDURE — G0463 HOSPITAL OUTPT CLINIC VISIT: HCPCS

## 2022-07-19 PROCEDURE — 99204 OFFICE O/P NEW MOD 45 MIN: CPT | Performed by: SPECIALIST

## 2022-07-19 ASSESSMENT — PAIN SCALES - GENERAL: PAINLEVEL: NO PAIN (0)

## 2022-07-19 NOTE — H&P (VIEW-ONLY)
This is a 66 year old  female who I'm asked to see by Kaiden Pruitt for evaluation of a left breast cancer.  This was picked up  on screening mammogram.   The patient cannot feel a mass.  However, she has noticed some dimpling of her skin of the breast.  A needle biopsy was done which shows an invasive ductal carcinoma.  It is estrogen receptor strongly positive , progesterone receptor strongly positive  and HER-2 negative.     Her sister  of breast cancer many years ago.  It sounds like she was very late stage at the time of diagnosis.  No other family history.      PAST MEDICAL HISTORY:  Past Medical History:   Diagnosis Date     History of anesthesia complications     vinethane - slow to wake up     Hypertension      PONV (postoperative nausea and vomiting)     as a child had vinethane slow to awake     Past Surgical History:   Procedure Laterality Date     APPENDECTOMY       COLON SURGERY Right     Colectomy - Large polyp     COLPORRHAPHY N/A 2017    Procedure:  UTEROSACRAL LIGAMENT SUSPENSION CYSTOSCOPY;  Surgeon: Nancy Girard MD;  [place] [facility];  Service:      EYE SURGERY Left     Cataract     HYSTERECTOMY  2017     HYSTERECTOMY VAGINAL N/A 2017    Procedure: VAGINAL HYSTERECTOMY;  Surgeon: Nancy Girard MD;  [place] [facility];  Service:        Medications:    Current Outpatient Medications:      atenolol-chlorthalidone (TENORETIC) 50-25 MG tablet, Take 1 tablet by mouth daily, Disp: 90 tablet, Rfl: 3     potassium chloride ER (KLOR-CON) 20 MEQ CR tablet, Take 1 tablet (20 mEq) by mouth daily, Disp: 90 tablet, Rfl: 0     quinapril (ACCUPRIL) 40 MG tablet, Take 1 tablet (40 mg) by mouth At Bedtime, Disp: 90 tablet, Rfl: 4    Allergies:  Allergies   Allergen Reactions     Amoxicillin Rash     Propoxyphene Rash     Red blotches on face        Social History:  Social History     Tobacco Use     Smoking status: Never Smoker     Smokeless tobacco: Never  Used   Substance Use Topics     Alcohol use: Not Currently     Comment: Alcoholic Drinks/day: rare     Drug use: No        ROS:  Pertinent items are noted in HPI.    Physical Exam  Wt 71.7 kg (158 lb)   BMI 24.38 kg/m    General:alert, appears stated age and cooperative  Lungs:clear to auscultation bilaterally  CV:regular rate and rhythm  Breasts: Clearly palpable mass in the upper outer quadrant of the left breast fairly close to the nipple.  There is dimpling of the skin over it.  Measures about 1 to 2 cm in size.  No other palpable masses.  Lymph Nodes:Supple without adenopathy  Neuro:Grossly normal  Musculoskeletal: Normal range of motion of her upper extremities.  Skin: Normal skin turgor.    Reviewed her mammograms and ultrasound and pathology.     Impression: Left Breast Cancer. Clinically T1, N0.  Discussed the surgical options for treatment of breast cancer which generally are a lumpectomy (partial mastectomy) combined with radiation versus a mastectomy.  Explained that the survival benefit is the same for both.  The difference is in local recurrence risk.  The patient is a Excellent candidate for a lumpectomy.  I did explain that one never knows if you are really a candidate for lumpectomy until you do the surgery and wait to see the final results and explained that it takes several days to get the results back.  The need for reexcision lumpectomy is not uncommon.  Discussed SLN biopsy.  The procedure and rationale were explained.  Discussed that at this point we do not know yet whether or not she will need chemotherapy and we may not know until we get all of the results of surgery back.  Often times, a test called an Oncotype is needed to determine whether or not chemotherapy is needed.  This can take several weeks to get the results back after surgery.      Plan: We'll schedule for a left lumpectomy with sentinel lymph node biopsy.  Almost all of our breast surgeries are now done as an outpatient.  The  risks and benefits of surgery were explained.  Also talked about expected recovery time.  Typically arrangements for oncology and radiation oncology are made after surgery once we have the results unless the patient is recommended to do neoadjuvant chemotherapy.

## 2022-07-19 NOTE — NURSING NOTE
Kristin is here with her  for surgical consult for new dx'd left breast cancer. She is feeling well otherwise. 10 min of nursing time spent. Alanna RN, OCN, CBCN

## 2022-07-19 NOTE — LETTER
2022         RE: Kristin Almanzar  [address] [place] [address]        Dear Colleague,    Thank you for referring your patient, Kristin Almanzar, to the [facility] [place]. Please see a copy of my visit note below.    This is a 66 year old  female who I'm asked to see by Kaiden Pruitt for evaluation of a left breast cancer.  This was picked up  on screening mammogram.   The patient cannot feel a mass.  However, she has noticed some dimpling of her skin of the breast.  A needle biopsy was done which shows an invasive ductal carcinoma.  It is estrogen receptor strongly positive , progesterone receptor strongly positive  and HER-2 negative.     Her sister  of breast cancer many years ago.  It sounds like she was very late stage at the time of diagnosis.  No other family history.      PAST MEDICAL HISTORY:  Past Medical History:   Diagnosis Date     History of anesthesia complications     vinethane - slow to wake up     Hypertension      PONV (postoperative nausea and vomiting)     as a child had vinethane slow to awake     Past Surgical History:   Procedure Laterality Date     APPENDECTOMY       COLON SURGERY Right     Colectomy - Large polyp     COLPORRHAPHY N/A 2017    Procedure:  UTEROSACRAL LIGAMENT SUSPENSION CYSTOSCOPY;  Surgeon: Nancy Girard MD;  Location: [facility];  Service:      EYE SURGERY Left     Cataract     HYSTERECTOMY  2017     HYSTERECTOMY VAGINAL N/A 2017    Procedure: VAGINAL HYSTERECTOMY;  Surgeon: Nancy Girard MD;  Location: [facility];  Service:        Medications:    Current Outpatient Medications:      atenolol-chlorthalidone (TENORETIC) 50-25 MG tablet, Take 1 tablet by mouth daily, Disp: 90 tablet, Rfl: 3     potassium chloride ER (KLOR-CON) 20 MEQ CR tablet, Take 1 tablet (20 mEq) by mouth daily, Disp: 90 tablet, Rfl: 0     quinapril (ACCUPRIL) 40 MG tablet, Take 1 tablet (40 mg) by mouth At  Bedtime, Disp: 90 tablet, Rfl: 4    Allergies:  Allergies   Allergen Reactions     Amoxicillin Rash     Propoxyphene Rash     Red blotches on face        Social History:  Social History     Tobacco Use     Smoking status: Never Smoker     Smokeless tobacco: Never Used   Substance Use Topics     Alcohol use: Not Currently     Comment: Alcoholic Drinks/day: rare     Drug use: No        ROS:  Pertinent items are noted in HPI.    Physical Exam  Wt 71.7 kg (158 lb)   BMI 24.38 kg/m    General:alert, appears stated age and cooperative  Lungs:clear to auscultation bilaterally  CV:regular rate and rhythm  Breasts: Clearly palpable mass in the upper outer quadrant of the left breast fairly close to the nipple.  There is dimpling of the skin over it.  Measures about 1 to 2 cm in size.  No other palpable masses.  Lymph Nodes:Supple without adenopathy  Neuro:Grossly normal  Musculoskeletal: Normal range of motion of her upper extremities.  Skin: Normal skin turgor.    Reviewed her mammograms and ultrasound and pathology.     Impression: Left Breast Cancer. Clinically T1, N0.  Discussed the surgical options for treatment of breast cancer which generally are a lumpectomy (partial mastectomy) combined with radiation versus a mastectomy.  Explained that the survival benefit is the same for both.  The difference is in local recurrence risk.  The patient is a Excellent candidate for a lumpectomy.  I did explain that one never knows if you are really a candidate for lumpectomy until you do the surgery and wait to see the final results and explained that it takes several days to get the results back.  The need for reexcision lumpectomy is not uncommon.  Discussed SLN biopsy.  The procedure and rationale were explained.  Discussed that at this point we do not know yet whether or not she will need chemotherapy and we may not know until we get all of the results of surgery back.  Often times, a test called an Oncotype is needed to  determine whether or not chemotherapy is needed.  This can take several weeks to get the results back after surgery.      Plan: We'll schedule for a left lumpectomy with sentinel lymph node biopsy.  Almost all of our breast surgeries are now done as an outpatient.  The risks and benefits of surgery were explained.  Also talked about expected recovery time.  Typically arrangements for oncology and radiation oncology are made after surgery once we have the results unless the patient is recommended to do neoadjuvant chemotherapy.              Again, thank you for allowing me to participate in the care of your patient.        Sincerely,        Anastasia Allen MD

## 2022-07-20 ENCOUNTER — TELEPHONE (OUTPATIENT)
Dept: SURGERY | Facility: CLINIC | Age: 67
End: 2022-07-20

## 2022-07-20 PROBLEM — C50.412 MALIGNANT NEOPLASM OF UPPER-OUTER QUADRANT OF LEFT BREAST IN FEMALE, ESTROGEN RECEPTOR POSITIVE (H): Status: ACTIVE | Noted: 2022-07-20

## 2022-07-20 PROBLEM — Z17.0 MALIGNANT NEOPLASM OF UPPER-OUTER QUADRANT OF LEFT BREAST IN FEMALE, ESTROGEN RECEPTOR POSITIVE (H): Status: ACTIVE | Noted: 2022-07-20

## 2022-07-20 NOTE — LETTER
We've received instruction to get you scheduled for surgery with Dr Allen. We have that arranged as follows:     Pre-op Physical: Dr. Allen will complete the morning of surgery    Surgery Date: 07/27/2022     Location: [facility] [address], [place] [address]    Approximate Arrival Time: 8:45 a.m.  (Unless instructed differently by the pre-op call nurse)     Post op Appointment: 08/04/2022 at 10:40 a.m., [facility], [place] [address].    Prep Tasks and Info:     1. Review your medications with your primary care or prescribing physician; they will advise you which meds to stop and when, and when you can resume taking.  Certain medications like blood thinners, need to be stopped in advance of surgery to proceed safely.    2. You must get tested for COVID-19, even if you are vaccinated.    Outpatient Surgery:  If you are going home the same day of surgery, a home rapid antigen Covid-19 test is required 1-2 days before surgery- regardless of your vaccination status.  Take a photo of the negative result and show to the nurse on the day of surgery. If you test positive, contact our office right away to reschedule surgery. You can buy a home Covid-19 Rapid Antigen test at many local pharmacies, or you can order for free at covid.gov/tests.    3. Please shower the evening before and morning of surgery with Hibiclens or Exidine soap.  This can be found at your local pharmacy.    4. Fasting instructions will be provided by the pre-op nurse who will call you 1-3 days before surgery.  Typically we advise normal food up to 8 hours before surgery then clear liquids only up to 2 hours before surgery then nothing at all by mouth for 2 hours including no gum or candy.  The nurse will review your specific instructions with you at the call.      5. Smoking impacts your body's ability to heal  properly.  If you are a smoker, we strongly urge you to stop smoking 4-6 weeks before surgery.    6. You will need an adult to drive you home and stay with you 24 hours after surgery. Public transportation or Medical Van Services are not permitted.    7. You may have one family member wait in the lobby at the surgery center during your surgery. Visitor restrictions are subject to change, please verify with the pre-op nurse when they call.    8. If the community sees a new COVID19 surge, your procedure may need to be postponed. We will contact you if this happens. You will be screened for high-risk exposure to Covid-19 during the pre-op call.  We encourage you to quarantine yourself away from any Covid-19 people for 10 days before surgery to avoid possible last minute cancellations.   When you arrive to the surgery center, you will again be screened for COVID19 symptoms. If you screen positive, your surgery will need to be postponed.    9. We always encourage you to notify your insurance any time you have medical tests or procedures scheduled including surgery. The number is usually right on the back of your insurance card. Please call [facility] Cost of Care at [phone] if you'd like a surgery quote.     Call our office if you have any questions! Thank you!

## 2022-07-20 NOTE — TELEPHONE ENCOUNTER
Spoke with patient this morning and she is scheduled for surgery next Wednesday, July 27th. We went over details leading up to surgery and also let her know I will be sending a surgery confirmation letter to her through [employer]. Patient in agreement with plan.

## 2022-07-26 ENCOUNTER — ANESTHESIA EVENT (OUTPATIENT)
Dept: SURGERY | Facility: AMBULATORY SURGERY CENTER | Age: 67
End: 2022-07-26
Payer: COMMERCIAL

## 2022-07-27 ENCOUNTER — TRANSFERRED RECORDS (OUTPATIENT)
Dept: HEALTH INFORMATION MANAGEMENT | Facility: CLINIC | Age: 67
End: 2022-07-27

## 2022-07-27 ENCOUNTER — HOSPITAL ENCOUNTER (OUTPATIENT)
Dept: NUCLEAR MEDICINE | Facility: HOSPITAL | Age: 67
Discharge: HOME | End: 2022-07-27
Attending: SPECIALIST | Admitting: RADIOLOGY
Payer: COMMERCIAL

## 2022-07-27 ENCOUNTER — HOSPITAL ENCOUNTER (OUTPATIENT)
Facility: AMBULATORY SURGERY CENTER | Age: 67
Discharge: HOME | End: 2022-07-27
Attending: SPECIALIST
Payer: COMMERCIAL

## 2022-07-27 ENCOUNTER — ANESTHESIA (OUTPATIENT)
Dept: SURGERY | Facility: AMBULATORY SURGERY CENTER | Age: 67
End: 2022-07-27
Payer: COMMERCIAL

## 2022-07-27 VITALS
BODY MASS INDEX: 23.04 KG/M2 | TEMPERATURE: 97 F | HEIGHT: 68 IN | DIASTOLIC BLOOD PRESSURE: 58 MMHG | WEIGHT: 152 LBS | RESPIRATION RATE: 16 BRPM | SYSTOLIC BLOOD PRESSURE: 112 MMHG | HEART RATE: 60 BPM | OXYGEN SATURATION: 94 %

## 2022-07-27 DIAGNOSIS — Z17.0 MALIGNANT NEOPLASM OF UPPER-OUTER QUADRANT OF LEFT BREAST IN FEMALE, ESTROGEN RECEPTOR POSITIVE (H): ICD-10-CM

## 2022-07-27 DIAGNOSIS — C50.412 MALIGNANT NEOPLASM OF UPPER-OUTER QUADRANT OF LEFT BREAST IN FEMALE, ESTROGEN RECEPTOR POSITIVE (H): ICD-10-CM

## 2022-07-27 PROCEDURE — 38525 BIOPSY/REMOVAL LYMPH NODES: CPT | Mod: LT | Performed by: SPECIALIST

## 2022-07-27 PROCEDURE — 19301 PARTIAL MASTECTOMY: CPT | Mod: LT | Performed by: SPECIALIST

## 2022-07-27 PROCEDURE — A9520 TC99 TILMANOCEPT DIAG 0.5MCI: HCPCS | Performed by: SPECIALIST

## 2022-07-27 PROCEDURE — 38792 RA TRACER ID OF SENTINL NODE: CPT

## 2022-07-27 PROCEDURE — 343N000001 HC RX 343: Performed by: SPECIALIST

## 2022-07-27 RX ORDER — FENTANYL CITRATE 50 UG/ML
INJECTION, SOLUTION INTRAMUSCULAR; INTRAVENOUS PRN
Status: DISCONTINUED | OUTPATIENT
Start: 2022-07-27 | End: 2022-07-27

## 2022-07-27 RX ORDER — LIDOCAINE 40 MG/G
CREAM TOPICAL
Status: DISCONTINUED | OUTPATIENT
Start: 2022-07-27 | End: 2022-07-28 | Stop reason: HOSPADM

## 2022-07-27 RX ORDER — ACETAMINOPHEN 325 MG/1
975 TABLET ORAL ONCE
Status: COMPLETED | OUTPATIENT
Start: 2022-07-27 | End: 2022-07-27

## 2022-07-27 RX ORDER — BUPIVACAINE HYDROCHLORIDE 2.5 MG/ML
INJECTION, SOLUTION INFILTRATION; PERINEURAL PRN
Status: DISCONTINUED | OUTPATIENT
Start: 2022-07-27 | End: 2022-07-27 | Stop reason: HOSPADM

## 2022-07-27 RX ORDER — FENTANYL CITRATE 0.05 MG/ML
25 INJECTION, SOLUTION INTRAMUSCULAR; INTRAVENOUS
Status: DISCONTINUED | OUTPATIENT
Start: 2022-07-27 | End: 2022-07-28 | Stop reason: HOSPADM

## 2022-07-27 RX ORDER — SODIUM CHLORIDE, SODIUM LACTATE, POTASSIUM CHLORIDE, CALCIUM CHLORIDE 600; 310; 30; 20 MG/100ML; MG/100ML; MG/100ML; MG/100ML
INJECTION, SOLUTION INTRAVENOUS CONTINUOUS
Status: DISCONTINUED | OUTPATIENT
Start: 2022-07-27 | End: 2022-07-28 | Stop reason: HOSPADM

## 2022-07-27 RX ORDER — HYDROMORPHONE HCL IN WATER/PF 6 MG/30 ML
0.2 PATIENT CONTROLLED ANALGESIA SYRINGE INTRAVENOUS EVERY 5 MIN PRN
Status: DISCONTINUED | OUTPATIENT
Start: 2022-07-27 | End: 2022-07-28 | Stop reason: HOSPADM

## 2022-07-27 RX ORDER — PROPOFOL 10 MG/ML
INJECTION, EMULSION INTRAVENOUS CONTINUOUS PRN
Status: DISCONTINUED | OUTPATIENT
Start: 2022-07-27 | End: 2022-07-27

## 2022-07-27 RX ORDER — FENTANYL CITRATE 0.05 MG/ML
25 INJECTION, SOLUTION INTRAMUSCULAR; INTRAVENOUS EVERY 5 MIN PRN
Status: DISCONTINUED | OUTPATIENT
Start: 2022-07-27 | End: 2022-07-28 | Stop reason: HOSPADM

## 2022-07-27 RX ORDER — OXYCODONE HYDROCHLORIDE 5 MG/1
5 TABLET ORAL EVERY 4 HOURS PRN
Status: DISCONTINUED | OUTPATIENT
Start: 2022-07-27 | End: 2022-07-28 | Stop reason: HOSPADM

## 2022-07-27 RX ORDER — ONDANSETRON 2 MG/ML
INJECTION INTRAMUSCULAR; INTRAVENOUS PRN
Status: DISCONTINUED | OUTPATIENT
Start: 2022-07-27 | End: 2022-07-27

## 2022-07-27 RX ORDER — GLYCOPYRROLATE 0.2 MG/ML
INJECTION, SOLUTION INTRAMUSCULAR; INTRAVENOUS PRN
Status: DISCONTINUED | OUTPATIENT
Start: 2022-07-27 | End: 2022-07-27

## 2022-07-27 RX ORDER — HYDROCODONE BITARTRATE AND ACETAMINOPHEN 5; 325 MG/1; MG/1
1 TABLET ORAL EVERY 6 HOURS PRN
Qty: 15 TABLET | Refills: 0 | Status: SHIPPED | OUTPATIENT
Start: 2022-07-27 | End: 2022-08-04

## 2022-07-27 RX ORDER — ONDANSETRON 2 MG/ML
4 INJECTION INTRAMUSCULAR; INTRAVENOUS EVERY 30 MIN PRN
Status: DISCONTINUED | OUTPATIENT
Start: 2022-07-27 | End: 2022-07-28 | Stop reason: HOSPADM

## 2022-07-27 RX ORDER — IBUPROFEN 600 MG/1
600 TABLET, FILM COATED ORAL
Status: DISCONTINUED | OUTPATIENT
Start: 2022-07-27 | End: 2022-07-28 | Stop reason: HOSPADM

## 2022-07-27 RX ORDER — LIDOCAINE HYDROCHLORIDE 20 MG/ML
INJECTION, SOLUTION INFILTRATION; PERINEURAL PRN
Status: DISCONTINUED | OUTPATIENT
Start: 2022-07-27 | End: 2022-07-27

## 2022-07-27 RX ORDER — MEPERIDINE HYDROCHLORIDE 25 MG/ML
12.5 INJECTION INTRAMUSCULAR; INTRAVENOUS; SUBCUTANEOUS
Status: DISCONTINUED | OUTPATIENT
Start: 2022-07-27 | End: 2022-07-28 | Stop reason: HOSPADM

## 2022-07-27 RX ORDER — HYDROCODONE BITARTRATE AND ACETAMINOPHEN 5; 325 MG/1; MG/1
1 TABLET ORAL
Status: DISCONTINUED | OUTPATIENT
Start: 2022-07-27 | End: 2022-07-28 | Stop reason: HOSPADM

## 2022-07-27 RX ORDER — ONDANSETRON 4 MG/1
4 TABLET, ORALLY DISINTEGRATING ORAL EVERY 30 MIN PRN
Status: DISCONTINUED | OUTPATIENT
Start: 2022-07-27 | End: 2022-07-28 | Stop reason: HOSPADM

## 2022-07-27 RX ORDER — KETAMINE HYDROCHLORIDE 10 MG/ML
INJECTION INTRAMUSCULAR; INTRAVENOUS PRN
Status: DISCONTINUED | OUTPATIENT
Start: 2022-07-27 | End: 2022-07-27

## 2022-07-27 RX ORDER — DEXAMETHASONE SODIUM PHOSPHATE 4 MG/ML
INJECTION, SOLUTION INTRA-ARTICULAR; INTRALESIONAL; INTRAMUSCULAR; INTRAVENOUS; SOFT TISSUE PRN
Status: DISCONTINUED | OUTPATIENT
Start: 2022-07-27 | End: 2022-07-27

## 2022-07-27 RX ADMIN — SODIUM CHLORIDE, SODIUM LACTATE, POTASSIUM CHLORIDE, CALCIUM CHLORIDE: 600; 310; 30; 20 INJECTION, SOLUTION INTRAVENOUS at 11:04

## 2022-07-27 RX ADMIN — DEXAMETHASONE SODIUM PHOSPHATE 4 MG: 4 INJECTION, SOLUTION INTRA-ARTICULAR; INTRALESIONAL; INTRAMUSCULAR; INTRAVENOUS; SOFT TISSUE at 10:21

## 2022-07-27 RX ADMIN — Medication 100 MCG: at 10:58

## 2022-07-27 RX ADMIN — KETAMINE HYDROCHLORIDE 10 MG: 10 INJECTION INTRAMUSCULAR; INTRAVENOUS at 10:18

## 2022-07-27 RX ADMIN — GLYCOPYRROLATE 0.2 MG: 0.2 INJECTION, SOLUTION INTRAMUSCULAR; INTRAVENOUS at 10:16

## 2022-07-27 RX ADMIN — Medication 100 MCG: at 10:40

## 2022-07-27 RX ADMIN — ONDANSETRON 4 MG: 2 INJECTION INTRAMUSCULAR; INTRAVENOUS at 10:16

## 2022-07-27 RX ADMIN — FENTANYL CITRATE 25 MCG: 50 INJECTION, SOLUTION INTRAMUSCULAR; INTRAVENOUS at 10:25

## 2022-07-27 RX ADMIN — Medication 100 MCG: at 10:49

## 2022-07-27 RX ADMIN — ACETAMINOPHEN 975 MG: 325 TABLET ORAL at 09:46

## 2022-07-27 RX ADMIN — Medication 100 MCG: at 10:34

## 2022-07-27 RX ADMIN — TILMANOCEPT 0.5 MILLICURIE: KIT at 08:49

## 2022-07-27 RX ADMIN — LIDOCAINE HYDROCHLORIDE 50 MG: 20 INJECTION, SOLUTION INFILTRATION; PERINEURAL at 10:16

## 2022-07-27 RX ADMIN — KETAMINE HYDROCHLORIDE 10 MG: 10 INJECTION INTRAMUSCULAR; INTRAVENOUS at 10:22

## 2022-07-27 RX ADMIN — PROPOFOL 200 MCG/KG/MIN: 10 INJECTION, EMULSION INTRAVENOUS at 10:18

## 2022-07-27 RX ADMIN — SODIUM CHLORIDE, SODIUM LACTATE, POTASSIUM CHLORIDE, CALCIUM CHLORIDE: 600; 310; 30; 20 INJECTION, SOLUTION INTRAVENOUS at 09:46

## 2022-07-27 NOTE — ANESTHESIA CARE TRANSFER NOTE
Patient: Kristin Almanzar    Procedure: Procedure(s):  Left Lumpectpmy; [place] Lymph Node Biopsy       Diagnosis: Malignant neoplasm of upper-outer quadrant of left breast in female, estrogen receptor positive (H) [C50.412, Z17.0]  Diagnosis Additional Information: No value filed.    Anesthesia Type:   MAC     Note:    Oropharynx: oropharynx clear of all foreign objects  Level of Consciousness: drowsy  Oxygen Supplementation: face mask  Level of Supplemental Oxygen (L/min / FiO2): 8  Independent Airway: airway patency satisfactory and stable  Dentition: dentition unchanged  Vital Signs Stable: post-procedure vital signs reviewed and stable  Report to RN Given: handoff report given  Patient transferred to: Phase II    Handoff Report: Identifed the Patient, Identified the Reponsible Provider, Reviewed the pertinent medical history, Discussed the surgical course, Reviewed Intra-OP anesthesia mangement and issues during anesthesia, Set expectations for post-procedure period and Allowed opportunity for questions and acknowledgement of understanding      Vitals:  Vitals Value Taken Time   /71    Temp 36.5C    Pulse 66    Resp 12    SpO2 100        Electronically Signed By: KENDRICK Traore CRNA  July 27, 2022  11:15 AM

## 2022-07-27 NOTE — DISCHARGE INSTRUCTIONS
You have received 975 mg of Acetaminophen (Tylenol) at 9:46 am. Please do not take an additional dose of Tylenol until after 3:45 pm.     Do not exceed 4,000 mg of acetaminophen during a 24 hour period and keep in mind that acetaminophen can also be found in many over-the-counter cold medications as well as narcotics that may be given for pain.     Discharge Instructions: After Your Surgery    You ve just had surgery. During surgery, you were given medicine called anesthesia to keep you relaxed and free of pain. After surgery, you may have some pain or nausea. This is common. Here are some tips for feeling better and getting well after surgery.    Going home    Your healthcare provider will show you how to take care of yourself when you go home. He or she will also answer your questions. Have an adult family member or friend drive you home. For the first 24 hours after your surgery:  Don't drive or use heavy equipment.  Don't make important decisions or sign legal papers.  Don't drink alcohol.  Have an adult stay with you for 24 hours. He or she can watch for problems and help keep you safe.  Be sure to go to all follow-up visits with your healthcare provider. And rest after your surgery for as long as your healthcare provider tells you to.    Coping with pain    If you have pain after surgery, pain medicine will help you feel better. Take it as told, before pain becomes severe. Also, ask your healthcare provider or pharmacist about other ways to control pain. This might be with heat, ice, or relaxation. And follow any other instructions your surgeon or nurse gives you.    Tips for taking pain medicine    To get the best relief possible, remember these points:  Pain medicines can upset your stomach. Taking them with a little food may help.  Most pain relievers taken by mouth need at least 20 to 30 minutes to start to work.  Don't wait till your pain becomes severe before you take your medicine. Try to time your  medicine so that you can take it before starting an activity. This might be before you get dressed, go for a walk, or sit down for dinner.  Constipation is a common side effect of pain medicines. It's ok to take medicines such as laxatives or stool softeners to help ease constipation. Also ask if you should skip any foods. Drinking lots of fluids and eating foods such as fruits and vegetables that are high in fiber can also help.  Drinking alcohol and taking pain medicine can cause dizziness and slow your breathing. It can even be deadly. Don't drink alcohol while taking pain medicine.  Pain medicine can make you react more slowly to things. Don't drive or run machinery while taking pain medicine.  Your healthcare provider may tell you to take acetaminophen to help ease your pain. Ask him or her how much you are supposed to take each day. Acetaminophen or other pain relievers may interact with your prescription medicines or other over-the-counter (OTC) medicines. Some prescription medicines have acetaminophen and other ingredients. Using both prescription and OTC acetaminophen for pain can cause you to overdose. Read the labels on your OTC medicines with care. This will help you to clearly know the list of ingredients, how much to take, and any warnings. It may also help you not take too much acetaminophen. If you have questions or don't understand the information, ask your pharmacist or healthcare provider to explain it to you before you take the OTC medicine.    Managing nausea    Some people have an upset stomach after surgery. This is often because of anesthesia, pain, or pain medicine, or the stress of surgery. These tips will help you handle nausea and eat healthy foods as you get better. If you were on a special food plan before surgery, ask your healthcare provider if you should follow it while you get better. These tips may help:    Don't push yourself to eat. Your body will tell you when to eat and how  much.  Start off with clear liquids and soup. They are easier to digest.  Next try semi-solid foods, such as mashed potatoes, applesauce, and gelatin, as you feel ready.  Slowly move to solid foods. Don t eat fatty, rich, or spicy foods at first.  Don't force yourself to have 3 large meals a day. Instead eat smaller amounts more often.  Take pain medicines with a small amount of solid food, such as crackers or toast, to prevent nausea.    When to call your healthcare provider    Call your healthcare provider if:  You still have intolerable pain an hour after taking medicine. The medicine may not be strong enough.  You feel too sleepy, dizzy, or groggy. The medicine may be too strong.  You have side effects such as nausea or vomiting, or skin changes such as rash, itching, or hives. Your healthcare provider may suggest other medicines to control side effects.  Rash, itching, or hives may mean you have an allergic reaction. Report this right away. If you have trouble breathing or facial swelling, call 911 right away.    If you have obstructive sleep apnea    You were given anesthesia medicine during surgery to keep you comfortable and free of pain. After surgery, you may have more apnea spells because of this medicine and other medicines you were given. The spells may last longer than usual.   At home:  Keep using the continuous positive airway pressure (CPAP) device when you sleep. Unless your healthcare provider tells you not to, use it when you sleep, day or night. CPAP is a common device used to treat obstructive sleep apnea.  Talk with your provider before taking any pain medicine, muscle relaxants, or sedatives. Your provider will tell you about the possible dangers of taking these medicines.    If you have any questions or concerns regarding your procedure, please contact Dr. Allen, her office number is [phone].

## 2022-07-27 NOTE — ANESTHESIA PREPROCEDURE EVALUATION
Anesthesia Pre-Procedure Evaluation    Patient: Kristin Almanzar   MRN: [id number] : 1955        Procedure : Procedure(s):  Left Lumpectpmy; [place] Lymph Node Biopsy          Past Medical History:   Diagnosis Date     History of anesthesia complications     vinethane - slow to wake up     Hypertension      PONV (postoperative nausea and vomiting)     as a child had vinethane slow to awake      Past Surgical History:   Procedure Laterality Date     APPENDECTOMY       COLON SURGERY Right     Colectomy - Large polyp     COLPORRHAPHY N/A 2017    Procedure:  UTEROSACRAL LIGAMENT SUSPENSION CYSTOSCOPY;  Surgeon: Nancy Girard MD;  Location: [facility] [place];  Service:      EYE SURGERY Left     Cataract     HYSTERECTOMY  2017     HYSTERECTOMY VAGINAL N/A 2017    Procedure: VAGINAL HYSTERECTOMY;  Surgeon: Nancy Girard MD;  Location: [facility] [place];  Service:       Allergies   Allergen Reactions     Amoxicillin Rash     Propoxyphene Rash     Red blotches on face      Social History     Tobacco Use     Smoking status: Never Smoker     Smokeless tobacco: Never Used   Substance Use Topics     Alcohol use: Not Currently     Comment: Alcoholic Drinks/day: rare      Wt Readings from Last 1 Encounters:   22 68.9 kg (152 lb)        Anesthesia Evaluation   Pt has had prior anesthetic.     History of anesthetic complications  - PONV.      ROS/MED HX  ENT/Pulmonary:  - neg pulmonary ROS     Neurologic:  - neg neurologic ROS     Cardiovascular:     (+) hypertension-----    METS/Exercise Tolerance: >4 METS    Hematologic:  - neg hematologic  ROS     Musculoskeletal:  - neg musculoskeletal ROS     GI/Hepatic:  - neg GI/hepatic ROS     Renal/Genitourinary:  - neg Renal ROS     Endo:     (+) thyroid problem, hypothyroidism,     Psychiatric/Substance Use:  - neg psychiatric ROS     Infectious Disease:  - neg infectious disease ROS     Malignancy:   (+) Malignancy, History of Breast.    Other:   - neg other ROS          Physical Exam    Airway  airway exam normal      Mallampati: II   TM distance: > 3 FB   Neck ROM: full   Mouth opening: > 3 cm    Respiratory Devices and Support         Dental  no notable dental history         Cardiovascular   cardiovascular exam normal          Pulmonary   pulmonary exam normal                OUTSIDE LABS:  CBC:   Lab Results   Component Value Date    WBC 5.8 06/20/2022    HGB 13.8 06/20/2022    HGB 14.1 03/25/2021    HCT 39.3 06/20/2022     06/20/2022     BMP:   Lab Results   Component Value Date     (L) 06/20/2022    POTASSIUM 3.9 06/20/2022    POTASSIUM 4.9 03/25/2021    CHLORIDE 100 06/20/2022    CO2 28 06/20/2022    BUN 13 06/20/2022    CR 0.66 06/20/2022     06/20/2022     COAGS: No results found for: PTT, INR, FIBR  POC: No results found for: BGM, HCG, HCGS  HEPATIC:   Lab Results   Component Value Date    ALBUMIN 4.2 06/20/2022    PROTTOTAL 6.7 06/20/2022    ALT 15 06/20/2022    AST 14 06/20/2022    ALKPHOS 47 06/20/2022    BILITOTAL 0.6 06/20/2022     OTHER:   Lab Results   Component Value Date    A1C 5.4 12/29/2014    JOO 9.9 06/20/2022    TSH 0.64 06/20/2022       Anesthesia Plan    ASA Status:  2   NPO Status:  NPO Appropriate    Anesthesia Type: MAC.     - Reason for MAC: straight local not clinically adequate   Induction: Propofol.   Maintenance: TIVA.        Consents    Anesthesia Plan(s) and associated risks, benefits, and realistic alternatives discussed. Questions answered and patient/representative(s) expressed understanding.    - Discussed:     - Discussed with:  Patient, Spouse      - Extended Intubation/Ventilatory Support Discussed: No.      - Patient is DNR/DNI Status: No    Use of blood products discussed: No .     Postoperative Care    Pain management: IV analgesics, Multi-modal analgesia, Oral pain medications.   PONV prophylaxis: Ondansetron (or other 5HT-3)     Comments:                Gabe Leon MD

## 2022-07-27 NOTE — ANESTHESIA POSTPROCEDURE EVALUATION
Patient: Kristin Almanzar    Procedure: Procedure(s):  Left Lumpectpmy; [place] Lymph Node Biopsy       Anesthesia Type:  MAC    Note:  Disposition: Outpatient   Postop Pain Control: Uneventful            Sign Out: Well controlled pain   PONV: No   Neuro/Psych: Uneventful            Sign Out: Acceptable/Baseline neuro status   Airway/Respiratory: Uneventful            Sign Out: Acceptable/Baseline resp. status   CV/Hemodynamics: Uneventful            Sign Out: Acceptable CV status; No obvious hypovolemia; No obvious fluid overload   Other NRE: NONE   DID A NON-ROUTINE EVENT OCCUR?            Last vitals:  Vitals Value Taken Time   /58 07/27/22 1145   Temp 97  F (36.1  C) 07/27/22 1114   Pulse 61 07/27/22 1149   Resp 16 07/27/22 1114   SpO2 94 % 07/27/22 1149   Vitals shown include unvalidated device data.    Electronically Signed By: Gabe Leon MD  July 27, 2022  11:52 AM

## 2022-07-27 NOTE — OP NOTE
Operative Note    Name:  Kristin Almanzar  PCP:  Kaiden Pruitt  Procedure Date:  7/27/2022       Procedure:  Procedure(s):  Left Lumpectpmy; [place] Lymph Node Biopsy     Pre-Procedure Diagnosis:  Malignant neoplasm of upper-outer quadrant of left breast in female, estrogen receptor positive (H) [C50.412, Z17.0]     Post-Procedure Diagnosis:    Same     Surgeon(s):  Anastasia Allen MD     Assistant: None      Anesthesia Type:  Monitor Anesthesia Care       Findings:  Somewhat suspicious SLN.    Operative Report:    The patient was brought to the operating suite where she was placed in the supine position.  She is prepped and draped in a sterile fashion.  After infiltration with a combination of 1% lidocaine and quarter percent Marcaine an elliptical shaped incision was made over the mass in the upper outer left breast.  This was carried down through the subcutaneous tissues and then  around the mass  widely with electrocautery.  Once removed the specimen was marked and sent to  pathology for permanent sectioning.  A curvilinear incision was then made in the lower portion of the axilla and carried down deep into the axilla fat pad.  Using the gamma probe I identified 2 sentinel lymph node(s).  This(kristin) were removed with electrocautery and sent for permanent sectioning.  1 of these lymph nodes felt a bit suspicious but the other 1 felt normal and there was nothing else palpable of concern in the axilla.  Both wounds were inspected for hemostasis and closed with 3-0 Vicryl to the subcutaneous tissues and a subcuticular stitch of 4-0 Monocryl to the skin.  Dressings were placed.  The patient tolerated the procedure well.    Estimated Blood Loss:   10cc    Specimens:    ID Type Source Tests Collected by Time Destination   1 :  Lumpectomy Breast, Left SURGICAL PATHOLOGY EXAM Anastasia Allen MD 7/27/2022 10:34 AM    2 :  Biopsy Lymph Node(s) [place], Breast, Left SURGICAL PATHOLOGY EXAM Anastasia Allen MD  7/27/2022 10:34 AM            Drains:        Complications:    None    Anastasia Allen MD     Date: 7/27/2022  Time: 11:10 AM

## 2022-07-31 DIAGNOSIS — I10 HTN (HYPERTENSION): ICD-10-CM

## 2022-07-31 RX ORDER — POTASSIUM CHLORIDE 1500 MG/1
TABLET, EXTENDED RELEASE ORAL
Qty: 90 TABLET | Refills: 3 | Status: SHIPPED | OUTPATIENT
Start: 2022-07-31 | End: 2023-06-20

## 2022-08-01 ENCOUNTER — DOCUMENTATION ONLY (OUTPATIENT)
Dept: SURGERY | Facility: CLINIC | Age: 67
End: 2022-08-01

## 2022-08-01 ENCOUNTER — PATIENT OUTREACH (OUTPATIENT)
Dept: ONCOLOGY | Facility: CLINIC | Age: 67
End: 2022-08-01

## 2022-08-01 ENCOUNTER — TRANSCRIBE ORDERS (OUTPATIENT)
Dept: ONCOLOGY | Facility: CLINIC | Age: 67
End: 2022-08-01

## 2022-08-01 DIAGNOSIS — C50.919 BREAST CANCER (H): Primary | ICD-10-CM

## 2022-08-01 DIAGNOSIS — C50.412 MALIGNANT NEOPLASM OF UPPER-OUTER QUADRANT OF LEFT BREAST IN FEMALE, ESTROGEN RECEPTOR POSITIVE (H): Primary | ICD-10-CM

## 2022-08-01 DIAGNOSIS — Z17.0 MALIGNANT NEOPLASM OF UPPER-OUTER QUADRANT OF LEFT BREAST IN FEMALE, ESTROGEN RECEPTOR POSITIVE (H): Primary | ICD-10-CM

## 2022-08-01 NOTE — TELEPHONE ENCOUNTER
"Last Written Prescription Date:  4/28/22  Last Fill Quantity: 90,  # refills: 0   Last office visit provider:  6/20/22     Requested Prescriptions   Pending Prescriptions Disp Refills     KLOR-CON 20 MEQ CR tablet [Pharmacy Med Name: POTASSIUM CHLORIDE ER (DISP) TABS 20MEQ] 90 tablet 3     Sig: TAKE 1 TABLET DAILY       Potassium Supplements Protocol Passed - 7/31/2022  8:34 AM        Passed - Recent (12 mo) or future (30 days) visit within the authorizing provider's department     Patient has had an office visit with the authorizing provider or a provider within the authorizing providers department within the previous 12 mos or has a future within next 30 days. See \"Patient Info\" tab in inbasket, or \"Choose Columns\" in Meds & Orders section of the refill encounter.              Passed - Medication is active on med list        Passed - Patient is age 18 or older        Passed - Normal serum potassium in past 12 months     Recent Labs   Lab Test 06/20/22  1040   POTASSIUM 3.9                         Mary Ellen Jones, RN 07/31/22 10:22 PM  "

## 2022-08-01 NOTE — PROGRESS NOTES
New Patient Oncology Nurse Navigator Note     Referring provider: Dr. Anastasia Allen     Referring Clinic/Organization: [facility]     Referred to (specialty:) Medical Oncology and Radiation Oncology     Date Referral Received: August 1, 2022     Evaluation for:  Breast cancer     Clinical History (per Nurse review of records provided):      Patient had bilateral screening mammogram on 6/13/22.There is a possible mass in the left breast at the 1 o'clock position, middle depth.There is a possible architectural distortion in the left breast central core, middle depth. Left breast diagnostic mammogram and ultrasound followed on 6/29 and on the additional tomographic images a spiculated mass is again seen in the 1:00 position in the mid depth. Other asymmetries seen on the screening exam resolve consistent with summation artifacts (superimposition of normal structures).  Targeted left breast ultrasound showed in the 1:00 position 3 cm from the nipple a spiculated mass with posterior acoustic shadowing and apparent invasion of the basement  membrane of the skin measuring 9 x 10 x 12 mm correlating with the mammographic abnormality. The left axilla was examined as well with no evidence of lymphadenopathy.    7/11/22 -   BREAST BIOPSY CONTAINING NEOPLASTIC LESION:  - BIOPSY TYPE: ULTRASOUND-GUIDED CORE BIOPSIES  - BIOPSY SITE: LEFT BREAST, 1 O'CLOCK POSITION, 3 CM FROM NIPPLE  - HISTOLOGIC TYPE: INVASIVE DUCTAL CARCINOMA  - HISTOLOGIC GRADE (NITZA HISTOLOGIC SCORE):  - TUBULE FORMATION SCORE 2  - NUCLEAR PLEOMORPHISM SCORE 2  - MITOTIC RATE SCORE 1  - OVERALL GRADE 1 (TOTAL SCORE 5/9)  - DUCTAL CARCINOMA IN SITU (DCIS): SCATTERED SMALL FOCI IDENTIFIED (SOLID PATTERN,  INTERMEDIATE NUCLEAR GRADE, NO NECROSIS)  - INVASIVE TUMOR OCCUPIES ENTIRE LENGTH OF EACH OF 3 BIOPSY CORES  - MICROCALCIFICATIONS: NUMEROUS, ASSOCIATED WITH TUMOR EPITHELIUM  - SMALL-VESSEL LYMPHATIC INVASION: NOT IDENTIFIED  -  ADDITIONAL FINDINGS: NONE  - ADDITIONAL STUDIES:  - ESTROGEN RECEPTORS POSITIVE (80% OF TUMOR NUCLEI STAIN STRONGLY)  - PROGESTERONE RECEPTORS POSITIVE (90% OF TUMOR NUCLEI STAIN STRONGLY)  - HER2/PRAVEEN RECEPTORS NEGATIVE FOR OVEREXPRESSION (1+ MEMBRANE STAINING)    7/27/22 -   A) LEFT BREAST, ORIENTED LUMPECTOMY:        1) INVASIVE DUCTAL CARCINOMA             a) Grade: [place] grade I (of III)             b) Size:  24 x 15 x 14 mm             c) Margins: Uninvolved, at 2 mm from the nearest anterior margin, and 10 mm from superior margin        2) DUCTAL CARCINOMA IN SITU: EIC Negative             a) Nuclear grade: Intermediate             b) Patterns: Solid and cribriform, no necrosis             c) Size: 1 mm in greatest measurement, <5% of the tumor   d) Margins: Uninvolved, at 2 mm from the nearest anterior margin, and at greater distance from other margin        3) ADDITIONAL FINDINGS:        Background abundant adipose tissue with atrophic ductal and lobular units, and focal weakly proliferative fibrocystic changes, with duct ectasia and focal cyst formation, apocrine metaplasia, columnar cell changes and focal usual ductal hyperplasia, no atypia        Benign skin, negative for Paget's disease        4) Previous biopsy site present, with cavity formation and organizing changes     B) LEFT SENTINEL LYMPH NODE, BIOPSY:        1) ONE OF ONE LYMPH NODE (1/1) POSITIVE FOR METASTATIC CARCINOMA, MEASURES UP TO 6 x 4 mm        2) FOCAL EXTRANODAL EXTENSION, >2 mm     PATHOLOGIC STAGE: pT2, (sn)pN1a, pM-Not applicable     Dr. Allen has ordered an Oncotype on 8/1. No ERD known yet.  Writer estimates specimen to lab by 8/4 and results by approximately 8/18 at latest pending any shipping or prior authorization hold ups.      Records Location: See Bookmarked material    Writer transcribed separate order for rad onc and flagged radiation oncology for that scheduling.  Writer received referral, reviewed for appropriate  plan, and sent to New Patient Scheduling for completion.

## 2022-08-04 ENCOUNTER — OFFICE VISIT (OUTPATIENT)
Dept: SURGERY | Facility: CLINIC | Age: 67
End: 2022-08-04
Attending: SPECIALIST
Payer: COMMERCIAL

## 2022-08-04 DIAGNOSIS — Z48.89 POSTOPERATIVE VISIT: ICD-10-CM

## 2022-08-04 DIAGNOSIS — Z17.0 MALIGNANT NEOPLASM OF UPPER-OUTER QUADRANT OF LEFT BREAST IN FEMALE, ESTROGEN RECEPTOR POSITIVE (H): Primary | ICD-10-CM

## 2022-08-04 DIAGNOSIS — C50.412 MALIGNANT NEOPLASM OF UPPER-OUTER QUADRANT OF LEFT BREAST IN FEMALE, ESTROGEN RECEPTOR POSITIVE (H): Primary | ICD-10-CM

## 2022-08-04 PROCEDURE — 99024 POSTOP FOLLOW-UP VISIT: CPT | Performed by: SPECIALIST

## 2022-08-04 PROCEDURE — G0463 HOSPITAL OUTPT CLINIC VISIT: HCPCS

## 2022-08-04 NOTE — NURSING NOTE
Kristin presents to [facility] for a post op appointment with Dr. Allen .  She isaccompanied by her  for appointment.  She states she is doing well, minimal pain.  She has good ROM, reviewed ROM exercises with her.  Patient met with Dr. Allen .  See dictation for details of visit.  Oncotype has been ordered, results pending.  She will plan to be referredonto Medical Oncology and Radiation Oncology and will plan to follow up with Dr. Allen  in one year with bilateral mammograms.  Invited calls sooner if she has any questions.  RN time 12 mins.

## 2022-08-04 NOTE — PROGRESS NOTES
In for follow-up of her left lumpectomy  with sentinel lymph node biopsy.  She is feeling well.  She is having very minimal pain.      Physical exam:  Appears well.  Does not appear in any discomfort.  Breasts: Incisions are healing nicely with no signs of infection.  No swelling.    Pathology: The tumor was 2.4 cm.  The margins are negative.  Her sentinel lymph node is Positive.    Impression: Postop visit. Doing well.  Went over the pathology again with her.  Reassured her that the positive node is not necessarily horribly bad news.  An Oncotype has already been ordered and there is still a very good chance she will not need chemotherapy.  She definitely needs at least hormone therapy and radiation.  Referrals to [facility] oncology have already been placed.    Plan:   Follow-up with me in 1 year with bilateral mammograms.  We will call her with her Oncotype when it is available.  She already has appointment set up with Dr. Rajput and Dr. Bess.

## 2022-08-04 NOTE — LETTER
8/4/2022         RE: Kristin Almanzar  [address] [place] [address]        Dear Colleague,    Thank you for referring your patient, Kristin Almanzar, to the [facility] [place]. Please see a copy of my visit note below.    In for follow-up of her left lumpectomy  with sentinel lymph node biopsy.  She is feeling well.  She is having very minimal pain.      Physical exam:  Appears well.  Does not appear in any discomfort.  Breasts: Incisions are healing nicely with no signs of infection.  No swelling.    Pathology: The tumor was 2.4 cm.  The margins are negative.  Her sentinel lymph node is Positive.    Impression: Postop visit. Doing well.  Went over the pathology again with her.  Reassured her that the positive node is not necessarily horribly bad news.  An Oncotype has already been ordered and there is still a very good chance she will not need chemotherapy.  She definitely needs at least hormone therapy and radiation.  Referrals to [facility] oncology have already been placed.    Plan:   Follow-up with me in 1 year with bilateral mammograms.  We will call her with her Oncotype when it is available.  She already has appointment set up with Dr. Rajput and Dr. Bess.        Again, thank you for allowing me to participate in the care of your patient.        Sincerely,        Anastasia Allen MD

## 2022-08-10 ENCOUNTER — TRANSFERRED RECORDS (OUTPATIENT)
Dept: HEALTH INFORMATION MANAGEMENT | Facility: CLINIC | Age: 67
End: 2022-08-10

## 2022-08-15 NOTE — TELEPHONE ENCOUNTER
Action August 15, 2022 3:33 PM ABT   Action Taken Called and spoke to patient, she has not seen med or rad onc before.     MEDICAL RECORDS REQUEST   Radiation Oncology  [address] [place] [address]  PHONE: [phone]  Fax: [phone]        FUTURE VISIT INFORMATION                                                   Kristin Almanzar, : 1955 scheduled for future visit at [facility]    APPOINTMENT INFORMATION:    Date: 22    Provider:  Dr. Aviva Bess    Reason for Visit/Diagnosis: Malignant neoplasm of upper-outer quadrant of left breast in female, estrogen receptor positive (H) [C50.412, Z17.0]     REFERRAL INFORMATION:    Referring provider:  Dr. Anastasia Allen    Specialty: Surgery    Referring providers clinic:  [facility] contact number:  [phone]    RECORDS REQUESTED FOR VISIT                                                     SOFT TISSUE & BREAST     CT, MRI, PET/CT AND REPORTS yes 22-22: US Breast  22-: Mammo  16-03/10/15: DEXA   ANY RECENT LABS yes 22   SURGICAL REPORTS yes 22: Left Lumpectpmy; [place] Lymph Node Biopsy   PATHOLOGY REPORTS yes   22: [id number]  22: [id number]     GENOMICS REPORTS Yes, 08/10/22 ONCOTPYE   CHEMO & MEDICAL ONCOLOGY NOTES no   CURRENT MEDICATION LIST yes     PRE-VISIT CHECKLIST      Record collection complete Yes   Appointment appropriately scheduled           (right time/right provider) Yes

## 2022-08-18 ENCOUNTER — TELEPHONE (OUTPATIENT)
Dept: SURGERY | Facility: CLINIC | Age: 67
End: 2022-08-18

## 2022-08-18 NOTE — TELEPHONE ENCOUNTER
Called patient to verify with her that Dr. Allen had called her with her Oncotype results, 16.  Patient said she was notified by Dr. Allen. She will be seeing Med Onc next week.  Support provided, invited calls.

## 2022-08-22 ENCOUNTER — ONCOLOGY VISIT (OUTPATIENT)
Dept: ONCOLOGY | Facility: HOSPITAL | Age: 67
End: 2022-08-22
Attending: INTERNAL MEDICINE
Payer: COMMERCIAL

## 2022-08-22 ENCOUNTER — PATIENT OUTREACH (OUTPATIENT)
Dept: ONCOLOGY | Facility: HOSPITAL | Age: 67
End: 2022-08-22

## 2022-08-22 VITALS
DIASTOLIC BLOOD PRESSURE: 85 MMHG | RESPIRATION RATE: 14 BRPM | WEIGHT: 157.7 LBS | BODY MASS INDEX: 23.9 KG/M2 | TEMPERATURE: 98.5 F | OXYGEN SATURATION: 99 % | HEIGHT: 68 IN | SYSTOLIC BLOOD PRESSURE: 192 MMHG | HEART RATE: 58 BPM

## 2022-08-22 DIAGNOSIS — C50.412 MALIGNANT NEOPLASM OF UPPER-OUTER QUADRANT OF LEFT BREAST IN FEMALE, ESTROGEN RECEPTOR POSITIVE (H): ICD-10-CM

## 2022-08-22 DIAGNOSIS — Z79.811 LONG TERM (CURRENT) USE OF AROMATASE INHIBITORS: ICD-10-CM

## 2022-08-22 DIAGNOSIS — Z17.0 MALIGNANT NEOPLASM OF UPPER-OUTER QUADRANT OF LEFT BREAST IN FEMALE, ESTROGEN RECEPTOR POSITIVE (H): ICD-10-CM

## 2022-08-22 PROCEDURE — G0463 HOSPITAL OUTPT CLINIC VISIT: HCPCS

## 2022-08-22 PROCEDURE — 99205 OFFICE O/P NEW HI 60 MIN: CPT | Performed by: INTERNAL MEDICINE

## 2022-08-22 RX ORDER — ANASTROZOLE 1 MG/1
1 TABLET ORAL DAILY
Qty: 90 TABLET | Refills: 4 | Status: SHIPPED | OUTPATIENT
Start: 2022-08-22 | End: 2023-11-13

## 2022-08-22 ASSESSMENT — PAIN SCALES - GENERAL: PAINLEVEL: NO PAIN (0)

## 2022-08-22 NOTE — PROGRESS NOTES
"Oncology Rooming Note    August 22, 2022 11:14 AM   Kristin Almanzar is a 66 year old female who presents for:    Chief Complaint   Patient presents with     Oncology Clinic Visit     New Patient Consult related to Breast cancer     Initial Vitals: There were no vitals taken for this visit. Estimated body mass index is 23.46 kg/m  as calculated from the following:    Height as of 7/27/22: 1.715 m (5' 7.5\").    Weight as of 7/27/22: 68.9 kg (152 lb). There is no height or weight on file to calculate BSA.  Data Unavailable Comment: Data Unavailable   No LMP recorded. Patient is postmenopausal.  Allergies reviewed: Yes  Medications reviewed: Yes    Medications: Medication refills not needed today.  Pharmacy name entered into EPIC:    [facility] - [address] [id number] - [place], [place] - [address] [place] [address] E AT [place] [address] & [facility]  [facility] - [place], [place] - [address] [place] [address]    Clinical concerns: New Patient Consult related to Breast cancer      MEENA PAREDES CMA            "

## 2022-08-22 NOTE — LETTER
"    8/22/2022         RE: Kristin Almanzar  [address] [place] [address]        Dear Colleague,    Thank you for referring your patient, Kristin Almanzar, to the [facility]. Please see a copy of my visit note below.    Oncology Rooming Note    August 22, 2022 11:14 AM   Kristin Almanzar is a 66 year old female who presents for:    Chief Complaint   Patient presents with     Oncology Clinic Visit     New Patient Consult related to Breast cancer     Initial Vitals: There were no vitals taken for this visit. Estimated body mass index is 23.46 kg/m  as calculated from the following:    Height as of 7/27/22: 1.715 m (5' 7.5\").    Weight as of 7/27/22: 68.9 kg (152 lb). There is no height or weight on file to calculate BSA.  Data Unavailable Comment: Data Unavailable   No LMP recorded. Patient is postmenopausal.  Allergies reviewed: Yes  Medications reviewed: Yes    Medications: Medication refills not needed today.  Pharmacy name entered into EPIC:    [facility]  [facility] [address] [facility][id number] - [place] [address] [address] AT [place] [address] & [facility]  [facility] - [address]    Clinical concerns: New Patient Consult related to Breast cancer      MEENA PAREDES CMA              [facility]cology Consult Note    Patient: Kristin Almanzar  MRN: [id number]  Date of Service: Aug 22, 2022           Reason for consultation      Problem List Items Addressed This Visit        Other    Malignant neoplasm of upper-outer quadrant of left breast in female, estrogen receptor positive (H)    Relevant Medications    anastrozole (ARIMIDEX) 1 MG tablet    Other Relevant Orders    Comprehensive metabolic panel    DX Hip/Pelvis/Spine      Other Visit Diagnoses     Long term (current) use of aromatase inhibitors         Relevant Medications    anastrozole " (ARIMIDEX) 1 MG tablet    Other Relevant Orders    DX Hip/Pelvis/Spine            Assessment / number of problems addressed      1.  A very pleasant 66 year old woman with invasive ductal carcinoma left breast T2 N1 M0 ER/WA positive HER2/sloane negative status postlumpectomy.  2.  Oncotype score of 16.  3.  Good general health otherwise.  4.  Some concern regarding endocrine therapy.  5.  Some degree of anxiety.    Plan and medical decision making      I had lengthy discussion with the patient.  Reviewed with her her Oncotype score results and its its significance.  She is likely that her score is low and she would not need any adjuvant chemotherapy.  She however does need adjuvant hormonal therapy.    1.  Recommend anastrozole 1 mg p.o. daily.  2.  We need to monitor her bone density.  3.  Follow-up with me in 3 months with labs etc.  4.  She also needs radiation oncology appointment.  Discussed with her the risk of recurrence in the absence of any radiation therapy and/or endocrine therapy.  5.  Follow-up with regular doctor for other medical issues.    Clinical/pathological stage      Cancer Staging  No matching staging information was found for the patient.    History of present illness      Ms. Kristin Almanzar is a 66 year old who has been referred to us for evaluation of left-sided breast cancer diagnosed in July 2022 when she presented for mammogram in June 2022.  The mammogram was abnormal.  She had some additional studies and then a biopsy.  The biopsy confirmed invasive ductal carcinoma.  She underwent surgery on 27th July 2022.  She had lumpectomy with sentinel lymph node biopsy done.  She had 24 mm invasive ductal carcinoma removed.  Griffin lymph nodes was positive 1 out of 2 with a 6 mm deposit.  No extracapsular extension.  Margins were negative.    She has been referred here for consideration of further therapy.  She also had an Oncotype score done.  The Oncotype score is 16.    Besides hypertension  she has been very healthy person.  Dr. Mcintosh is her primary care doctor.    Detailed review of systems      A 14 point review of systems was obtained.  Positive findings noted in the history.  Rest of the review of system is otherwise negative.      Past medical/surgical/social/family history        Past Medical History:   Diagnosis Date     History of anesthesia complications     vinethane - slow to wake up     Hypertension      PONV (postoperative nausea and vomiting)     as a child had vinethane slow to awake     Past Surgical History:   Procedure Laterality Date     APPENDECTOMY       COLON SURGERY Right     Colectomy - Large polyp     COLPORRHAPHY N/A 6/27/2017    Procedure:  UTEROSACRAL LIGAMENT SUSPENSION CYSTOSCOPY;  Surgeon: Nancy Girard MD;  Location: [facility];  Service:      EYE SURGERY Left     Cataract     HYSTERECTOMY  06/27/2017     HYSTERECTOMY VAGINAL N/A 6/27/2017    Procedure: VAGINAL HYSTERECTOMY;  Surgeon: Nancy Girard MD;  Location: [facility];  Service:      LUMPECTOMY BREAST Left 7/27/2022    Procedure: Left Lumpectpmy; [place] Lymph Node Biopsy;  Surgeon: Anastasia Allen MD;  Location: [facility]     Family History   Problem Relation Age of Onset     Colon Cancer Father 78.00     Breast Cancer Sister 41.00     Cancer Brother 55.00        Prostate     Social History     Socioeconomic History     Marital status:      Spouse name: None     Number of children: None     Years of education: None     Highest education level: None   Tobacco Use     Smoking status: Never Smoker     Smokeless tobacco: Never Used   Vaping Use     Vaping Use: Never used   Substance and Sexual Activity     Alcohol use: Not Currently     Comment: Alcoholic Drinks/day: rare     Drug use: No           Allergies      Allergies   Allergen Reactions     Amoxicillin Rash     Propoxyphene Rash     Red blotches on face         Physical exam        BP (!) 192/85 (BP Location: Left  "arm, Patient Position: Sitting, Cuff Size: Adult Regular)   Pulse 58   Temp 98.5  F (36.9  C) (Oral)   Resp 14   Ht 1.715 m (5' 7.5\")   Wt 71.5 kg (157 lb 11.2 oz)   SpO2 99%   BMI 24.33 kg/m        GENERAL: Alert and oriented to time place and person. Seated comfortably. In no distress.    HEAD: Atraumatic and normocephalic.    EYES: DEBRA, EOMI. No pallor. No icterus.    Oral cavity: no mucosal lesion or tonsillar enlargement.    NECK: supple. JVP normal.No thyroid enlargement.    LYMPH NODES: No palpable, cervical, axillary or inguinal lymphadenopathy.    CHEST: clear to auscultation bilaterally. Symmetrical breath movements bilaterally.    CVS: S1 and S2 are Regular rate and rhythm. No murmur or gallop or rub heard. No peripheral edema.    ABDOMEN: Soft. Not tender. Not distended. No palpable hepatomegaly or splenomegaly. No other mass palpable. Bowel sounds heard.    EXTREMITIES: Warm.  Minimal arthritic changes.    NEUROLOGICAL: Alert awake oriented.  Otherwise intact.  Cranial nerves appears to be preserved.    SKIN: no rash, or bruising or purpura.      Laboratory data      No results found for this or any previous visit (from the past 168 hour(s)).    [facility], [address] [place] [address], [address]   [address]#: [id number]   CASE: [id number]   GENDER: F   DATE OF BIRTH: 1955   PATIENT: DIETER SHOOK   ICD9 Code: C50.412, C50.412    SPECIMEN(S):   A) Breast, lumpectomy, left - oriented (39907) B) Lymph   node(s), sentinel, breast, left (91300)     CLINICAL INFORMATION:    Malignant neoplasm of upper-outer quad of L   breast in female, ER+ (H) [C50.412,  Z17.0]; left lumpectomy, SLN   biopsy.  [facility] prev path report ([id number]) and [facility] imaging rep (06/29/22)   obtained and reviewed.      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             a) Nuclear grade: Intermediate             b) Patterns: Solid and cribriform, no necrosis             c) Size: 1 mm in greatest measurement, <5% of the tumor   d) Margins: Uninvolved, at 2 mm from the nearest anterior margin, and at   greater distance from other margin        3) ADDITIONAL FINDINGS:        Background abundant adipose tissue with atrophic ductal and lobular   units, and focal weakly proliferative fibrocystic changes, with duct   ectasia and focal cyst formation, apocrine metaplasia, columnar cell   changes and focal usual ductal hyperplasia, no atypia        Benign skin, negative for Paget's disease        4) Previous biopsy site present, with cavity formation and   organizing changes     B) LEFT SENTINEL LYMPH NODE, BIOPSY:        1) ONE OF ONE LYMPH NODE (1/1) POSITIVE FOR METASTATIC CARCINOMA,            MEASURES UP TO 6 x 4 mm        2) FOCAL EXTRANODAL EXTENSION, >2 mm     PATHOLOGIC STAGE: pT2, (sn)pN1a, pM-Not applicable     See staging parameters below     SYNOPTIC REPORT:     INVASIVE CARCINOMA OF THE BREAST: RESECTION   SPECIMENS:     A: BREAST, LUMPECTOMY, LEFT - ORIENTED   B: LYMPH NODE(S), SENTINEL, BREAST, LEFT     SPECIMEN   PROCEDURE:     EXCISION (LESS THAN TOTAL MASTECTOMY)   SPECIMEN LATERALITY:     LEFT   TUMOR   TUMOR SITE:    UPPER OUTER QUADRANT   CLOCK POSITION   1 O'CLOCK   DISTANCE FROM NIPPLE (CENTIMETERS)   3CM   HISTOLOGIC TYPE:    INVASIVE CARCINOMA OF NO SPECIAL TYPE (DUCTAL)   HISTOLOGIC GRADE:   GLANDULAR (ACINAR) / TUBULAR DIFFERENTIATION:   SCORE 2   NUCLEAR PLEOMORPHISM:    SCORE 2   MITOTIC RATE:  SCORE 1   OVERALL GRADE: GRADE 1 (SCORES OF 3, 4 OR 5)   TUMOR SIZE:    24MM X 15MM X 14MM   TUMOR FOCALITY:     SINGLE FOCUS OF INVASIVE  CARCINOMA   DUCTAL CARCINOMA IN SITU (DCIS):   PRESENT   NEGATIVE FOR EXTENSIVE INTRADUCTAL COMPONENT (EIC)   SIZE (EXTENT) OF DCIS:   ESTIMATED SIZE (EXTENT) OF DCIS IS AT LEAST   1MM   NUMBER OF BLOCKS WITH DCIS:   5   NUMBER OF BLOCKS EXAMINED:    75   ARCHITECTURAL PATTERNS:  CRIBRIFORM   SOLID   NUCLEAR GRADE: GRADE II (INTERMEDIATE)   NECROSIS: NOT IDENTIFIED   LOBULAR CARCINOMA IN SITU (LCIS):  NOT IDENTIFIED   TUMOR EXTENT   SKIN:     SKIN INVASION: SKIN IS PRESENT AND UNINVOLVED   SKELETAL MUSCLE:    NO SKELETAL MUSCLE IS PRESENT   LYMPHOVASCULAR INVASION: CANNOT BE DETERMINED   FOCI SUSPICIOUS, BUT CAN NOT ENTIRELY EXCLUDE TISSUE FIXATION ARTIFACTSS   DERMAL LYMPHOVASCULAR INVASION:    NOT IDENTIFIED   MICROCALCIFICATIONS:     PRESENT IN INVASIVE CARCINOMA   PRESENT IN NON-NEOPLASTIC TISSUE   TREATMENT EFFECT IN THE BREAST:    NO KNOWN PRESURGICAL THERAPY   MARGINS   INVASIVE CARCINOMA MARGINS:   UNINVOLVED BY INVASIVE CARCINOMA   DISTANCE FROM CLOSEST MARGIN (MILLIMETERS):  2MM   CLOSEST MARGIN(S):  ANTERIOR   ANTERIOR MARGIN:    2   POSTERIOR MARGIN:   17   SUPERIOR MARGIN:    10   INFERIOR MARGIN:    17   MEDIAL MARGIN: 15   LATERAL MARGIN:     23   DCIS MARGINS:  UNINVOLVED BY DCIS   DISTANCE FROM CLOSEST MARGIN (MILLIMETERS):  2MM   CLOSEST MARGIN(S):  ANTERIOR   ANTERIOR MARGIN (MILLIMETERS):     2   POSTERIOR MARGIN (MILLIMETERS):    17   SUPERIOR MARGIN (MILLIMETERS):     10   INFERIOR MARGIN (MILLIMETERS):     17   MEDIAL MARGIN (MILLIMETERS):  15   LATERAL MARGIN (MILLIMETERS): 23   LYMPH NODES   REGIONAL LYMPH NODES:    INVOLVED BY TUMOR CELLS   NUMBER OF LYMPH NODES WITH MACROMETASTASES (> 2 MM):   1   NUMBER OF LYMPH NODES WITH MICROMETASTASES:  0   NUMBER OF LYMPH NODES WITH ISOLATED TUMOR CELLS (<= 0.2 MM OR <= 200   CELLS):   0   SIZE OF LARGEST METASTATIC DEPOSIT (MILLIMETERS): 6MM   EXTRANODAL EXTENSION:    PRESENT   EXTENT OF EXTRANODAL EXTENSION:   GREATER THAN 2 MM   TOTAL NUMBER OF  LYMPH NODES EXAMINED:   1   NUMBER OF SENTINEL NODES EXAMINED: 1   PATHOLOGIC STAGE CLASSIFICATION (PTNM, AJCC 8TH EDITION)   PRIMARY TUMOR (PT): PT2   REGIONAL LYMPH NODES MODIFIER:     (SN): SENTINEL NODE(S) EVALUATED.   REGIONAL LYMPH NODES (PN):    PN1A   ADDITIONAL FINDINGS   ADDITIONAL FINDINGS:   BACKGROUND ABUNDANT ADIPOSE TISSUE WITH ATROPHIC DUCTAL AND LOBULAR   UNITS, AND FOCAL WEAKLY PROLIFERATIVE FIBROCYSTIC CHANGES, WITH DUCT   ECTASIA AND FOCAL CYST FORMATION, APOCRINE METAPLASIA, COLUMNAR CELL   CHANGES AND FOCAL USUAL DUCTAL HYPERPLASIA, NO ATYPIA   BENIGN SKIN, NEGATIVE FOR PAGET'S DISEASE   PREVIOUS BIOPSY SITE PRESENT, WITH CAVITY FORMATION AND ORGANIZING   CHANGES   BREAST BIOMARKER TESTING PERFORMED ON PREVIOUS BIOPSY: ESTROGEN RECEPTOR   (ER) STATUS:   POSITIVE (GREATER THAN 10% OF CELLS DEMONSTRATE NUCLEAR   POSITIVITY)   PERCENTAGE OF CELLS WITH NUCLEAR POSITIVITY: 71-80%   PROGESTERONE RECEPTOR (PGR) STATUS:     POSITIVE   PERCENTAGE OF CELLS WITH NUCLEAR POSITIVITY:   81-90%   HER2 (BY IMMUNOHISTOCHEMISTRY):   NEGATIVE (SCORE 1+)   TESTING PERFORMED ON CASE NUMBER:  PERFORMED IN CORE BIOPSY, [id number]     Imaging results        CT Chest/Abdomen/Pelvis w Contrast    Result Date: 8/26/2022  EXAM: PET ONCOLOGY WHOLE BODY, CT CHEST/ABDOMEN/PELVIS W CONTRAST LOCATION: [facility] DATE/TIME: 8/26/2022 12:42 PM INDICATION: Initial treatment planning and staging for malignant neoplasm of upper outer quadrant of left female breast. COMPARISON: Mammogram and breast ultrasound dated 07/11/2022 CONTRAST: 78 mL Isovue-370 TECHNIQUE: Serum glucose level 99 mg/dL. One hour post intravenous administration of 9.9 mCi F-18 FDG, PET imaging was performed from the skull vertex to feet, utilizing attenuation correction with concurrent axial CT and PET/CT image fusion. Separate diagnostic CT of the chest, abdomen, and pelvis was performed. Dose reduction techniques were used. PET/CT  FINDINGS: Mild FDG avid soft tissue stranding in the upper outer left breast (max SUV 3.1) and left axilla (max SUV 3.6) likely representing posttreatment inflammatory change from recent left breast lumpectomy and axillary lymph node biopsy without convincing evidence of active neoplasm Degenerative right greater than left hip synovitis. CT FINDINGS: Mild senescent intracranial changes. Postoperative change of bilateral lenses. Mild paranasal sinus mucosal thickening. Thyroid goiter with non-FDG avid thyroid nodules. Mild coronary artery calcium. Right hemicolectomy and appendectomy. Hysterectomy. Pelvic phleboliths. Multilevel degenerative changes in spine. The lower extremities are unremarkable.     IMPRESSION: Findings favored to represent posttreatment inflammatory change from recent left breast lumpectomy and left axillary lymph node biopsy without convincing evidence of active neoplasm.    PET Oncology Whole Body    Result Date: 8/26/2022  EXAM: PET ONCOLOGY WHOLE BODY, CT CHEST/ABDOMEN/PELVIS W CONTRAST LOCATION: [facility] DATE/TIME: 8/26/2022 12:42 PM INDICATION: Initial treatment planning and staging for malignant neoplasm of upper outer quadrant of left female breast. COMPARISON: Mammogram and breast ultrasound dated 07/11/2022 CONTRAST: 78 mL Isovue-370 TECHNIQUE: Serum glucose level 99 mg/dL. One hour post intravenous administration of 9.9 mCi F-18 FDG, PET imaging was performed from the skull vertex to feet, utilizing attenuation correction with concurrent axial CT and PET/CT image fusion. Separate diagnostic CT of the chest, abdomen, and pelvis was performed. Dose reduction techniques were used. PET/CT FINDINGS: Mild FDG avid soft tissue stranding in the upper outer left breast (max SUV 3.1) and left axilla (max SUV 3.6) likely representing posttreatment inflammatory change from recent left breast lumpectomy and axillary lymph node biopsy without convincing evidence of active  neoplasm Degenerative right greater than left hip synovitis. CT FINDINGS: Mild senescent intracranial changes. Postoperative change of bilateral lenses. Mild paranasal sinus mucosal thickening. Thyroid goiter with non-FDG avid thyroid nodules. Mild coronary artery calcium. Right hemicolectomy and appendectomy. Hysterectomy. Pelvic phleboliths. Multilevel degenerative changes in spine. The lower extremities are unremarkable.     IMPRESSION: Findings favored to represent posttreatment inflammatory change from recent left breast lumpectomy and left axillary lymph node biopsy without convincing evidence of active neoplasm.    Images reviewed.    This note has been dictated using voice recognition software. Any grammatical or context distortions are unintentional and inherent to the software      Signed by: Quincy Rajput MD, MD        Again, thank you for allowing me to participate in the care of your patient.        Sincerely,        Quincy Rajput MD, MD

## 2022-08-23 ENCOUNTER — TELEPHONE (OUTPATIENT)
Dept: ONCOLOGY | Facility: HOSPITAL | Age: 67
End: 2022-08-23

## 2022-08-26 ENCOUNTER — HOSPITAL ENCOUNTER (OUTPATIENT)
Dept: PET IMAGING | Facility: HOSPITAL | Age: 67
Discharge: HOME | End: 2022-08-26
Attending: INTERNAL MEDICINE
Payer: COMMERCIAL

## 2022-08-26 DIAGNOSIS — Z17.0 MALIGNANT NEOPLASM OF UPPER-OUTER QUADRANT OF LEFT BREAST IN FEMALE, ESTROGEN RECEPTOR POSITIVE (H): ICD-10-CM

## 2022-08-26 DIAGNOSIS — C50.412 MALIGNANT NEOPLASM OF UPPER-OUTER QUADRANT OF LEFT BREAST IN FEMALE, ESTROGEN RECEPTOR POSITIVE (H): ICD-10-CM

## 2022-08-26 LAB
CREAT BLD-MCNC: 0.6 MG/DL (ref 0.6–1.1)
GFR SERPL CREATININE-BSD FRML MDRD: >60 ML/MIN/1.73M2
GLUCOSE BLDC GLUCOMTR-MCNC: 99 MG/DL (ref 70–99)

## 2022-08-26 PROCEDURE — 343N000001 HC RX 343: Performed by: INTERNAL MEDICINE

## 2022-08-26 PROCEDURE — A9552 F18 FDG: HCPCS | Performed by: INTERNAL MEDICINE

## 2022-08-26 PROCEDURE — 250N000011 HC RX IP 250 OP 636: Performed by: INTERNAL MEDICINE

## 2022-08-26 PROCEDURE — 78816 PET IMAGE W/CT FULL BODY: CPT | Mod: PI

## 2022-08-26 PROCEDURE — 82565 ASSAY OF CREATININE: CPT

## 2022-08-26 PROCEDURE — 74177 CT ABD & PELVIS W/CONTRAST: CPT

## 2022-08-26 PROCEDURE — 82962 GLUCOSE BLOOD TEST: CPT | Mod: GZ

## 2022-08-26 RX ORDER — IOPAMIDOL 755 MG/ML
78 INJECTION, SOLUTION INTRAVASCULAR ONCE
Status: COMPLETED | OUTPATIENT
Start: 2022-08-26 | End: 2022-08-26

## 2022-08-26 RX ADMIN — IOPAMIDOL 78 ML: 755 INJECTION, SOLUTION INTRAVENOUS at 11:46

## 2022-08-26 RX ADMIN — FLUDEOXYGLUCOSE F-18 9.92 MCI.: 500 INJECTION, SOLUTION INTRAVENOUS at 10:42

## 2022-08-29 ENCOUNTER — OFFICE VISIT (OUTPATIENT)
Dept: RADIATION ONCOLOGY | Facility: CLINIC | Age: 67
End: 2022-08-29
Attending: RADIOLOGY
Payer: COMMERCIAL

## 2022-08-29 ENCOUNTER — PRE VISIT (OUTPATIENT)
Dept: RADIATION ONCOLOGY | Facility: HOSPITAL | Age: 67
End: 2022-08-29

## 2022-08-29 VITALS
DIASTOLIC BLOOD PRESSURE: 88 MMHG | BODY MASS INDEX: 24.21 KG/M2 | TEMPERATURE: 98.3 F | WEIGHT: 156.9 LBS | OXYGEN SATURATION: 99 % | RESPIRATION RATE: 16 BRPM | HEART RATE: 63 BPM | SYSTOLIC BLOOD PRESSURE: 184 MMHG

## 2022-08-29 DIAGNOSIS — C50.412 MALIGNANT NEOPLASM OF UPPER-OUTER QUADRANT OF LEFT BREAST IN FEMALE, ESTROGEN RECEPTOR POSITIVE (H): ICD-10-CM

## 2022-08-29 DIAGNOSIS — Z17.0 MALIGNANT NEOPLASM OF UPPER-OUTER QUADRANT OF LEFT BREAST IN FEMALE, ESTROGEN RECEPTOR POSITIVE (H): ICD-10-CM

## 2022-08-29 DIAGNOSIS — C50.919 BREAST CANCER (H): ICD-10-CM

## 2022-08-29 PROCEDURE — 77263 THER RADIOLOGY TX PLNG CPLX: CPT | Performed by: RADIOLOGY

## 2022-08-29 PROCEDURE — G0463 HOSPITAL OUTPT CLINIC VISIT: HCPCS

## 2022-08-29 PROCEDURE — 99205 OFFICE O/P NEW HI 60 MIN: CPT | Mod: 25 | Performed by: RADIOLOGY

## 2022-08-29 NOTE — PROGRESS NOTES
Patient here ambulatory accompanied by , Connor, for radiation consult for her breast cancer.  15 minutes spent in review of radiation process and potential side effects.  Written information given for review.  Seen by Dr. Bess.  Plan return to clinic for follow up and/or CT simulation as directed by physician.      Radiation Therapy Patient Education    Person involved with teaching: Patient and     Patient educational needs for self management of treatment-related side effects assessment completed.  [facility] Patient Ed tab contains Patient Learning Assessment    Education Materials Given  Radiation Therapy and You, Understanding Radiation Therapy, Radiation Therapy Team, Radiation Therapy Treatment, Radiation Therapy: Managing Short-Term Side Effects, Skin Care During Radiation Therapy, Radiation Therapy: Your Daily Life, Radiation Therapy to the Breast Guidelines, Welcome Letter, Insurance PA Information and Map [facility]    Educational Topics Discussed  Side effects expected and Skin care    Response To Teaching  More review necessary    GYN Only  Vaginal Dilator-given and educated: N/A    Referrals sent: None    Chemotherapy?  No, hormone therapy with Dr. Rajput

## 2022-08-29 NOTE — PROGRESS NOTES
[facility] Radiation Oncology Consult Note      Patient: Kristin Almanzar  MRN: [id number]  Date of Service: 08/29/2022    Assessment:       ICD-10-CM    1. Malignant neoplasm of upper-outer quadrant of left breast in female, estrogen receptor positive (H)  C50.412 Radiation Therapy Referral    Z17.0    2. Breast cancer (H)  C50.919 Adult Oncology/Hematology  Referral        Impression/Plan:   66 year old female with left breast IDC grade I, 24 x 15 x 14 mm,  DCIS intermediate grade, <5% of total tumor volume, all margins negative (>2mm anterior), 1/1 SLN pos with extranodal extension >2mm. Oncotype Dx returned at 16. ER/TN pos, HER-2 neg.    1. This is a 66 year old female with left breast IDC grade I. It was found on screening mammogram. Her final pathology showed a 2.4 cm tumor with negative margins. She did have 1/1 SLN pos. Her Oncotype returned low at 16 so she will not need chemotherapy, she has seen Dr Young who discussed anastrozole with her.  We discussed the rationale behind radiation therapy. Planning to deliver 4240 cGy over 16 fractions to left breast.     2.  Three common misconceptions of radiation were addressed:              1) there is no transfer of heat, so no burns in traditional sense. Skin erythema from irritation.              2) there is no radioactivity at anytime during or after completion of each treatment.Treatment is just energy passing through target.               3) generally radiation does not cause immunosuppression.     3. Common side effects to expect are fatigue and skin reaction which typically occur 1-2 weeks after start of radiation therapy.  Symptoms typically worsen throughout treatment, peak 1-2 weeks after completion of radiation, and then resolve over the next several weeks. We briefly discussed skin cares but will expand on this further in clinic while on treatment. The patient voiced understanding of the information discussed and wishes to  proceed forward with radiation therapy. She will undergo CT simulation to begin radiation therapy planning.     4. Patient with questions regarding radiation to thyroid, not in field and will not radiate these LNs.    5. Discussed the importance of ROM exercises to minimize left shoulder injury. Exercises demonstrated in clinic.     Intent of Therapy: Curative  Patient on concurrent Herceptin No  Adjuvant hormonal therapy: Yes Agent: Tamoxifen  Start: Following radiation  Chemotherapy: N/A   Intended fractionation schedule:  Hypofractionation no boost    We recommend adjuvant radiation as part of her breast conserving therapy to decrease her chance of local recurrence to the single digits (4-6%). ROM stretches and skin care were discussed with the patient. She understands that these maneuvers need to continue for 6 months following completion of radiation as skin and muscle fibrosis continue to form for weeks to months following completion of therapy.      Side effects that may occur during or within weeks after radiation therapy      Fatigue and general weakness    Darkening, irritation, itchiness, redness, dryness, erythema, peeling, scabbing, ulceration and contraction of the skin of the breast and chest    Swelling of the breast    Loss of armpit hair    Lung irritation    Decrease in appetite    Side effects that may occur months or years after radiation therapy      Development of another tumor or cancer    Thickening, telangiectasias (development of spider like blood vessels in the skin) and ulceration of the skin of the breast and chest    Firming, fibrosis (scar tissue), fat necrosis, and distortion of the breast    Poor healing after a trauma or surgery in the irradiated area    Nerve damage resulting in loss of arm strength and sensation    Coronary artery blockage causing angina pain or a heart attack    Lung inflammation of fibrosis causing cough, fever and shortness of breath    Fracture of the  ribs    Swelling of an arm and hand    Decreased range of motion of the left upper extremity which may result in shoulder/rotator cuff injury.      The risks, benefits and alternatives to radiation therapy were outlined with the patient. All questions were answered and a consent was signed.      Subjective:   This patient was referred to myself by Dr. Allen for consideration of radiation therapy.    HPI:  Kristin Almanzar is a 66 year old female with left breast IDC.     The patient had a screening mammogram on 6/13/22, a possible mass was detected on the left breast at 1:00. Further evaluation with left breast ultrasound and diagnostic mammogram on 6/29/22 which confirmed a mass in the left breast highly suspicious for malignancy. An US guided needle biopsy was performed on 7/11/22, pathology showed IDC grade I, ER/AK pos, HER-2 negative.     A left lumpectomy was performed on 7/27/22, final pathology showed IDC grade I, 24 x 15 x 14 mm,  DCIS intermediate grade, <5% of total tumor volume, all margins negative (>2mm anterior), 1/1 SLN pos with extranodal extension >2mm. Oncotype Dx returned at 16. PET CT on 8/26/2022 negative for active neoplasm or distant disease, findings favoring posttreatment inflammatory change.     The patient presents today to discuss radiation therapy. Healed well following surgery. Describing left hip pain but otherwise no further complaints.       Past Medical History:   Diagnosis Date     History of anesthesia complications     vinethane - slow to wake up     Hypertension      PONV (postoperative nausea and vomiting)     as a child had vinethane slow to awake     Past Surgical History:   Procedure Laterality Date     APPENDECTOMY       COLON SURGERY Right     Colectomy - Large polyp     COLPORRHAPHY N/A 6/27/2017    Procedure:  UTEROSACRAL LIGAMENT SUSPENSION CYSTOSCOPY;  Surgeon: Nancy Girard MD;  Location: [facility];  Service:      EYE SURGERY Left     Cataract      HYSTERECTOMY  06/27/2017     HYSTERECTOMY VAGINAL N/A 6/27/2017    Procedure: VAGINAL HYSTERECTOMY;  Surgeon: Nancy Girard MD;  Location: [facility] Main OR;  Service:      LUMPECTOMY BREAST Left 7/27/2022    Procedure: Left Lumpectpmy; [place] Lymph Node Biopsy;  Surgeon: Anastasia Allen MD;  Location: [facility] Main OR     Current Outpatient Medications   Medication     atenolol-chlorthalidone (TENORETIC) 50-25 MG tablet     Ibuprofen (ADVIL PO)     KLOR-CON 20 MEQ CR tablet     quinapril (ACCUPRIL) 40 MG tablet     anastrozole (ARIMIDEX) 1 MG tablet     No current facility-administered medications for this visit.     Amoxicillin and Propoxyphene  Social History     Socioeconomic History     Marital status:      Spouse name: Not on file     Number of children: Not on file     Years of education: Not on file     Highest education level: Not on file   Occupational History     Not on file   Tobacco Use     Smoking status: Never Smoker     Smokeless tobacco: Never Used   Vaping Use     Vaping Use: Never used   Substance and Sexual Activity     Alcohol use: Not Currently     Comment: Alcoholic Drinks/day: rare     Drug use: No     Sexual activity: Not on file   Other Topics Concern     Not on file   Social History Narrative     Not on file     Social Determinants of Health     Financial Resource Strain: Not on file   Food Insecurity: Not on file   Transportation Needs: Not on file   Physical Activity: Not on file   Stress: Not on file   Social Connections: Not on file   Intimate Partner Violence: Not on file   Housing Stability: Not on file     . Never smoker. Endorses rare alcohol consumption.    Family History   Problem Relation Age of Onset     Colon Cancer Father 78.00     Breast Cancer Sister 41.00     Cancer Brother 55.00        Prostate     Sister with history of breast cancer, brother with prostate cancer, father with colon cancer.     ROS:  Reviewed with Kristin Almanzar  today.      General  Constitutional  Constitutional (WDL): All constitutional elements are within defined limits  EENT  Eye Disorders  Eye Disorder (WDL): All eye disorder elements are within defined limits (wears reading glasses)  Ear Disorders  Ear Disorder (WDL): Exceptions to WDL  Tinnitus: Absent or within normal limits (occasionally bilateral)  Respiratory    Respiratory  Respiratory (WDL): All respiratory elements are within defined limits  Cardiovascular  Cardiovascular  Cardiovascular (WDL): All cardiovascular elements are within defined limits  Gastrointestinal  Gastrointestinal  Gastrointestinal (WDL): All gastrointestinal elements are within defined limits  Musculoskeletal  Musculoskeletal and Connective Tissue Disorders  Musculoskeletal & Connective (WDL): Exceptions to WDL  Arthralgia: Mild pain (right hip)  Integumentary              Integumentary  Integumentary (WDL): Exceptions to WDL (healing incision left breast)  Neurological  Neurosensory  Neurosensory (WDL): All neurosensory elements are within defined limits  Genitourinary/Reproductive  Genitourinary  Genitourinary (WDL): All genitourinary elements are within defined limits  Lymphatic   Lymph System Disorders  Lymph (WDL): All lymph elements are within defined limits  Patient Coping  Patient Coping: Accepting;Open/discussion  Pain   Pain Score: Moderate Pain (5)   AUA Assessment                                                                         Accompanied by  Accompanied By: spouse ( - Connor)        Objective:        Exam:    Vitals:    08/29/22 1257 08/29/22 1300   BP: (!) 184/88    Pulse: 63    Resp: 16    Temp: 98.3  F (36.8  C)    TempSrc: Oral    SpO2: 99%    Weight: 71.2 kg (156 lb 14.4 oz)    PainSc:  Moderate Pain (5)   PainLoc:  Hip       GENERAL: No acute distress. Cooperative in conversation. Mask on.   RESP: Normal respiratory effort.   Breasts:  Small singlestitch erupting through skin at surgical incision. Expected  post operative changes, no masses skin changes suggestive of recurrence or infection.   ABD: Soft, nontender.  NEURO: Non focal. Alert and oriented x3.   PSYCH: Within normal limits. No depression or anxiety.  SKIN: Warm dry intact.     Recent Labs:   Recent Results (from the past 168 hour(s))   Glucose by meter   Result Value Ref Range    GLUCOSE BY METER POCT 99 70 - 99 mg/dL   Creatinine POCT   Result Value Ref Range    Creatinine POCT 0.6 0.6 - 1.1 mg/dL    GFR, ESTIMATED POCT >60 >60 mL/min/1.73m2       Imaging: Imaging results 30 days: CT Chest/Abdomen/Pelvis w Contrast    Result Date: 8/26/2022  EXAM: PET ONCOLOGY WHOLE BODY, CT CHEST/ABDOMEN/PELVIS W CONTRAST LOCATION: [facility] DATE/TIME: 8/26/2022 12:42 PM INDICATION: Initial treatment planning and staging for malignant neoplasm of upper outer quadrant of left female breast. COMPARISON: Mammogram and breast ultrasound dated 07/11/2022 CONTRAST: 78 mL Isovue-370 TECHNIQUE: Serum glucose level 99 mg/dL. One hour post intravenous administration of 9.9 mCi F-18 FDG, PET imaging was performed from the skull vertex to feet, utilizing attenuation correction with concurrent axial CT and PET/CT image fusion. Separate diagnostic CT of the chest, abdomen, and pelvis was performed. Dose reduction techniques were used. PET/CT FINDINGS: Mild FDG avid soft tissue stranding in the upper outer left breast (max SUV 3.1) and left axilla (max SUV 3.6) likely representing posttreatment inflammatory change from recent left breast lumpectomy and axillary lymph node biopsy without convincing evidence of active neoplasm Degenerative right greater than left hip synovitis. CT FINDINGS: Mild senescent intracranial changes. Postoperative change of bilateral lenses. Mild paranasal sinus mucosal thickening. Thyroid goiter with non-FDG avid thyroid nodules. Mild coronary artery calcium. Right hemicolectomy and appendectomy. Hysterectomy. Pelvic phleboliths.  Multilevel degenerative changes in spine. The lower extremities are unremarkable.     IMPRESSION: Findings favored to represent posttreatment inflammatory change from recent left breast lumpectomy and left axillary lymph node biopsy without convincing evidence of active neoplasm.    PET Oncology Whole Body    Result Date: 8/26/2022  EXAM: PET ONCOLOGY WHOLE BODY, CT CHEST/ABDOMEN/PELVIS W CONTRAST LOCATION: [facility] DATE/TIME: 8/26/2022 12:42 PM INDICATION: Initial treatment planning and staging for malignant neoplasm of upper outer quadrant of left female breast. COMPARISON: Mammogram and breast ultrasound dated 07/11/2022 CONTRAST: 78 mL Isovue-370 TECHNIQUE: Serum glucose level 99 mg/dL. One hour post intravenous administration of 9.9 mCi F-18 FDG, PET imaging was performed from the skull vertex to feet, utilizing attenuation correction with concurrent axial CT and PET/CT image fusion. Separate diagnostic CT of the chest, abdomen, and pelvis was performed. Dose reduction techniques were used. PET/CT FINDINGS: Mild FDG avid soft tissue stranding in the upper outer left breast (max SUV 3.1) and left axilla (max SUV 3.6) likely representing posttreatment inflammatory change from recent left breast lumpectomy and axillary lymph node biopsy without convincing evidence of active neoplasm Degenerative right greater than left hip synovitis. CT FINDINGS: Mild senescent intracranial changes. Postoperative change of bilateral lenses. Mild paranasal sinus mucosal thickening. Thyroid goiter with non-FDG avid thyroid nodules. Mild coronary artery calcium. Right hemicolectomy and appendectomy. Hysterectomy. Pelvic phleboliths. Multilevel degenerative changes in spine. The lower extremities are unremarkable.     IMPRESSION: Findings favored to represent posttreatment inflammatory change from recent left breast lumpectomy and left axillary lymph node biopsy without convincing evidence of active  neoplasm.    Pathology:   No results found for this or any previous visit (from the past 8760 hour(s)).  Pathology Results        Malignant - Lumpectomy, Left - 7/27/2022      Pathology Code Malignancy Type    Invasive ductal carcinoma Invasive    Ductal carcinoma in situ intermediate nuclear grade Non-Invasive    Axillary isis with metastatic carcinoma Other          Additional Information            Concordance: Not Entered Primary Tumor: T2      Regional Lymph Nodes: N1    Stage: 2B      Histology Grade: G1 Margin Status: Uninvolved      Closest Margin: Anterior    Nodes Biopsied: [place] Closest Margin Distance: 2 mm    Removed: 1     Positive: 1     Extracapsular Extension: Present           Invasive: 24 mm     In Situ: 1 mm           Had Neoadjuvant Chemotherapy: No     Completely Submitted for Microscopic Analysis: Yes     External: No              Malignant - Ultrasound Guided Biopsy, Left - 7/11/2022      Pathology Code Malignancy Type    Invasive ductal carcinoma Invasive    Ductal carcinoma in situ intermediate nuclear grade Non-Invasive          Additional Information            Concordance: Concordant Estrogen: Positive      Progesterone: Positive    Histology Grade: G1     HER2/[place] IHC: 1+     HER2/[place] FISH: Negative           External: No                   60 minutes spent on the date of the encounter doing chart review, review of outside records, review of test results, interpretation of tests, patient visit, documentation.       I, Aviva Bess MD personally performed the services described in this documentation, as scribed by Tony Coulter in my presence, and it is both accurate and complete.    Signed by: Aviva Bess MD, MPH

## 2022-08-29 NOTE — LETTER
8/29/2022         RE: Kristin Almanzar  [address] [place] [address]        Dear Colleague,    Thank you for referring your patient, Kristin Almanzar, to the [facility] RADIATION ONCOLOGY [place]. Please see a copy of my visit note below.          [facility] Radiation Oncology Consult Note      Patient: Kristin Almanzar  MRN: [id number]  Date of Service: 08/29/2022    Assessment:       ICD-10-CM    1. Malignant neoplasm of upper-outer quadrant of left breast in female, estrogen receptor positive (H)  C50.412 Radiation Therapy Referral    Z17.0    2. Breast cancer (H)  C50.919 Adult Oncology/Hematology  Referral        Impression/Plan:   66 year old female with left breast IDC grade I, 24 x 15 x 14 mm,  DCIS intermediate grade, <5% of total tumor volume, all margins negative (>2mm anterior), 1/1 SLN pos with extranodal extension >2mm. Oncotype Dx returned at 16. ER/GA pos, HER-2 neg.    1. This is a 66 year old female with left breast IDC grade I. It was found on screening mammogram. Her final pathology showed a 2.4 cm tumor with negative margins. She did have 1/1 SLN pos. Her Oncotype returned low at 16 so she will not need chemotherapy, she has seen Dr Young who discussed anastrozole with her.  We discussed the rationale behind radiation therapy. Planning to deliver 4240 cGy over 16 fractions to left breast.     2.  Three common misconceptions of radiation were addressed:              1) there is no transfer of heat, so no burns in traditional sense. Skin erythema from irritation.              2) there is no radioactivity at anytime during or after completion of each treatment.Treatment is just energy passing through target.               3) generally radiation does not cause immunosuppression.     3. Common side effects to expect are fatigue and skin reaction which typically occur 1-2 weeks after start of radiation therapy.  Symptoms typically worsen throughout treatment,  peak 1-2 weeks after completion of radiation, and then resolve over the next several weeks. We briefly discussed skin cares but will expand on this further in clinic while on treatment. The patient voiced understanding of the information discussed and wishes to proceed forward with radiation therapy. She will undergo CT simulation to begin radiation therapy planning.     4. Patient with questions regarding radiation to thyroid, not in field and will not radiate these LNs.    5. Discussed the importance of ROM exercises to minimize left shoulder injury. Exercises demonstrated in clinic.     Intent of Therapy: Curative  Patient on concurrent Herceptin No  Adjuvant hormonal therapy: Yes Agent: Tamoxifen  Start: Following radiation  Chemotherapy: N/A   Intended fractionation schedule:  Hypofractionation no boost    We recommend adjuvant radiation as part of her breast conserving therapy to decrease her chance of local recurrence to the single digits (4-6%). ROM stretches and skin care were discussed with the patient. She understands that these maneuvers need to continue for 6 months following completion of radiation as skin and muscle fibrosis continue to form for weeks to months following completion of therapy.      Side effects that may occur during or within weeks after radiation therapy      Fatigue and general weakness    Darkening, irritation, itchiness, redness, dryness, erythema, peeling, scabbing, ulceration and contraction of the skin of the breast and chest    Swelling of the breast    Loss of armpit hair    Lung irritation    Decrease in appetite    Side effects that may occur months or years after radiation therapy      Development of another tumor or cancer    Thickening, telangiectasias (development of spider like blood vessels in the skin) and ulceration of the skin of the breast and chest    Firming, fibrosis (scar tissue), fat necrosis, and distortion of the breast    Poor healing after a trauma or  surgery in the irradiated area    Nerve damage resulting in loss of arm strength and sensation    Coronary artery blockage causing angina pain or a heart attack    Lung inflammation of fibrosis causing cough, fever and shortness of breath    Fracture of the ribs    Swelling of an arm and hand    Decreased range of motion of the left upper extremity which may result in shoulder/rotator cuff injury.      The risks, benefits and alternatives to radiation therapy were outlined with the patient. All questions were answered and a consent was signed.      Subjective:   This patient was referred to myself by Dr. Allen for consideration of radiation therapy.    HPI:  Kristin Almanzar is a 66 year old female with left breast IDC.     The patient had a screening mammogram on 6/13/22, a possible mass was detected on the left breast at 1:00. Further evaluation with left breast ultrasound and diagnostic mammogram on 6/29/22 which confirmed a mass in the left breast highly suspicious for malignancy. An US guided needle biopsy was performed on 7/11/22, pathology showed IDC grade I, ER/PA pos, HER-2 negative.     A left lumpectomy was performed on 7/27/22, final pathology showed IDC grade I, 24 x 15 x 14 mm,  DCIS intermediate grade, <5% of total tumor volume, all margins negative (>2mm anterior), 1/1 SLN pos with extranodal extension >2mm. Oncotype Dx returned at 16. PET CT on 8/26/2022 negative for active neoplasm or distant disease, findings favoring posttreatment inflammatory change.     The patient presents today to discuss radiation therapy. Healed well following surgery. Describing left hip pain but otherwise no further complaints.       Past Medical History:   Diagnosis Date     History of anesthesia complications     vinethane - slow to wake up     Hypertension      PONV (postoperative nausea and vomiting)     as a child had vinethane slow to awake     Past Surgical History:   Procedure Laterality Date     APPENDECTOMY        COLON SURGERY Right     Colectomy - Large polyp     COLPORRHAPHY N/A 6/27/2017    Procedure:  UTEROSACRAL LIGAMENT SUSPENSION CYSTOSCOPY;  Surgeon: Nancy Girard MD;  Location: [facility];  Service:      EYE SURGERY Left     Cataract     HYSTERECTOMY  06/27/2017     HYSTERECTOMY VAGINAL N/A 6/27/2017    Procedure: VAGINAL HYSTERECTOMY;  Surgeon: Nancy Girard MD;  Location: [facility];  Service:      LUMPECTOMY BREAST Left 7/27/2022    Procedure: Left Lumpectpmy; [place] Lymph Node Biopsy;  Surgeon: Anastasia Allen MD;  [facility]: [facility]     Current Outpatient Medications   Medication     atenolol-chlorthalidone (TENORETIC) 50-25 MG tablet     Ibuprofen (ADVIL PO)     KLOR-CON 20 MEQ CR tablet     quinapril (ACCUPRIL) 40 MG tablet     anastrozole (ARIMIDEX) 1 MG tablet     No current facility-administered medications for this visit.     Amoxicillin and Propoxyphene  Social History     Socioeconomic History     Marital status:      Spouse name: Not on file     Number of children: Not on file     Years of education: Not on file     Highest education level: Not on file   Occupational History     Not on file   Tobacco Use     Smoking status: Never Smoker     Smokeless tobacco: Never Used   Vaping Use     Vaping Use: Never used   Substance and Sexual Activity     Alcohol use: Not Currently     Comment: Alcoholic Drinks/day: rare     Drug use: No     Sexual activity: Not on file   Other Topics Concern     Not on file   Social History Narrative     Not on file     Social Determinants of Health     Financial Resource Strain: Not on file   Food Insecurity: Not on file   Transportation Needs: Not on file   Physical Activity: Not on file   Stress: Not on file   Social Connections: Not on file   Intimate Partner Violence: Not on file   Housing Stability: Not on file     . Never smoker. Endorses rare alcohol consumption.    Family History   Problem Relation Age of Onset      Colon Cancer Father 78.00     Breast Cancer Sister 41.00     Cancer Brother 55.00        Prostate     Sister with history of breast cancer, brother with prostate cancer, father with colon cancer.     ROS:  Reviewed with Kristin Almanzar today.      General  Constitutional  Constitutional (WDL): All constitutional elements are within defined limits  EENT  Eye Disorders  Eye Disorder (WDL): All eye disorder elements are within defined limits (wears reading glasses)  Ear Disorders  Ear Disorder (WDL): Exceptions to WDL  Tinnitus: Absent or within normal limits (occasionally bilateral)  Respiratory    Respiratory  Respiratory (WDL): All respiratory elements are within defined limits  Cardiovascular  Cardiovascular  Cardiovascular (WDL): All cardiovascular elements are within defined limits  Gastrointestinal  Gastrointestinal  Gastrointestinal (WDL): All gastrointestinal elements are within defined limits  Musculoskeletal  Musculoskeletal and Connective Tissue Disorders  Musculoskeletal & Connective (WDL): Exceptions to WDL  Arthralgia: Mild pain (right hip)  Integumentary              Integumentary  Integumentary (WDL): Exceptions to WDL (healing incision left breast)  Neurological  Neurosensory  Neurosensory (WDL): All neurosensory elements are within defined limits  Genitourinary/Reproductive  Genitourinary  Genitourinary (WDL): All genitourinary elements are within defined limits  Lymphatic   Lymph System Disorders  Lymph (WDL): All lymph elements are within defined limits  Patient Coping  Patient Coping: Accepting;Open/discussion  Pain   Pain Score: Moderate Pain (5)   AUA Assessment                                                                         Accompanied by  Accompanied By: spouse ( - Connor)        Objective:        Exam:    Vitals:    08/29/22 1257 08/29/22 1300   BP: (!) 184/88    Pulse: 63    Resp: 16    Temp: 98.3  F (36.8  C)    TempSrc: Oral    SpO2: 99%    Weight: 71.2 kg (156 lb 14.4 oz)     PainSc:  Moderate Pain (5)   PainLoc:  Hip       GENERAL: No acute distress. Cooperative in conversation. Mask on.   RESP: Normal respiratory effort.   Breasts:  Small singlestitch erupting through skin at surgical incision. Expected post operative changes, no masses skin changes suggestive of recurrence or infection.   ABD: Soft, nontender.  NEURO: Non focal. Alert and oriented x3.   PSYCH: Within normal limits. No depression or anxiety.  SKIN: Warm dry intact.     Recent Labs:   Recent Results (from the past 168 hour(s))   Glucose by meter   Result Value Ref Range    GLUCOSE BY METER POCT 99 70 - 99 mg/dL   Creatinine POCT   Result Value Ref Range    Creatinine POCT 0.6 0.6 - 1.1 mg/dL    GFR, ESTIMATED POCT >60 >60 mL/min/1.73m2       Imaging: Imaging results 30 days: CT Chest/Abdomen/Pelvis w Contrast    Result Date: 8/26/2022  EXAM: PET ONCOLOGY WHOLE BODY, CT CHEST/ABDOMEN/PELVIS W CONTRAST LOCATION: [facility] DATE/TIME: 8/26/2022 12:42 PM INDICATION: Initial treatment planning and staging for malignant neoplasm of upper outer quadrant of left female breast. COMPARISON: Mammogram and breast ultrasound dated 07/11/2022 CONTRAST: 78 mL Isovue-370 TECHNIQUE: Serum glucose level 99 mg/dL. One hour post intravenous administration of 9.9 mCi F-18 FDG, PET imaging was performed from the skull vertex to feet, utilizing attenuation correction with concurrent axial CT and PET/CT image fusion. Separate diagnostic CT of the chest, abdomen, and pelvis was performed. Dose reduction techniques were used. PET/CT FINDINGS: Mild FDG avid soft tissue stranding in the upper outer left breast (max SUV 3.1) and left axilla (max SUV 3.6) likely representing posttreatment inflammatory change from recent left breast lumpectomy and axillary lymph node biopsy without convincing evidence of active neoplasm Degenerative right greater than left hip synovitis. CT FINDINGS: Mild senescent intracranial changes.  Postoperative change of bilateral lenses. Mild paranasal sinus mucosal thickening. Thyroid goiter with non-FDG avid thyroid nodules. Mild coronary artery calcium. Right hemicolectomy and appendectomy. Hysterectomy. Pelvic phleboliths. Multilevel degenerative changes in spine. The lower extremities are unremarkable.     IMPRESSION: Findings favored to represent posttreatment inflammatory change from recent left breast lumpectomy and left axillary lymph node biopsy without convincing evidence of active neoplasm.    PET Oncology Whole Body    Result Date: 8/26/2022  EXAM: PET ONCOLOGY WHOLE BODY, CT CHEST/ABDOMEN/PELVIS W CONTRAST LOCATION: [facility] DATE/TIME: 8/26/2022 12:42 PM INDICATION: Initial treatment planning and staging for malignant neoplasm of upper outer quadrant of left female breast. COMPARISON: Mammogram and breast ultrasound dated 07/11/2022 CONTRAST: 78 mL Isovue-370 TECHNIQUE: Serum glucose level 99 mg/dL. One hour post intravenous administration of 9.9 mCi F-18 FDG, PET imaging was performed from the skull vertex to feet, utilizing attenuation correction with concurrent axial CT and PET/CT image fusion. Separate diagnostic CT of the chest, abdomen, and pelvis was performed. Dose reduction techniques were used. PET/CT FINDINGS: Mild FDG avid soft tissue stranding in the upper outer left breast (max SUV 3.1) and left axilla (max SUV 3.6) likely representing posttreatment inflammatory change from recent left breast lumpectomy and axillary lymph node biopsy without convincing evidence of active neoplasm Degenerative right greater than left hip synovitis. CT FINDINGS: Mild senescent intracranial changes. Postoperative change of bilateral lenses. Mild paranasal sinus mucosal thickening. Thyroid goiter with non-FDG avid thyroid nodules. Mild coronary artery calcium. Right hemicolectomy and appendectomy. Hysterectomy. Pelvic phleboliths. Multilevel degenerative changes in spine.  The lower extremities are unremarkable.     IMPRESSION: Findings favored to represent posttreatment inflammatory change from recent left breast lumpectomy and left axillary lymph node biopsy without convincing evidence of active neoplasm.    Pathology:   No results found for this or any previous visit (from the past 8760 hour(s)).  Pathology Results        Malignant - Lumpectomy, Left - 7/27/2022      Pathology Code Malignancy Type    Invasive ductal carcinoma Invasive    Ductal carcinoma in situ intermediate nuclear grade Non-Invasive    Axillary isis with metastatic carcinoma Other          Additional Information            Concordance: Not Entered Primary Tumor: T2      Regional Lymph Nodes: N1    Stage: 2B      Histology Grade: G1 Margin Status: Uninvolved      Closest Margin: Anterior    Nodes Biopsied: [place] Closest Margin Distance: 2 mm    Removed: 1     Positive: 1     Extracapsular Extension: Present           Invasive: 24 mm     In Situ: 1 mm           Had Neoadjuvant Chemotherapy: No     Completely Submitted for Microscopic Analysis: Yes     External: No              Malignant - Ultrasound Guided Biopsy, Left - 7/11/2022      Pathology Code Malignancy Type    Invasive ductal carcinoma Invasive    Ductal carcinoma in situ intermediate nuclear grade Non-Invasive          Additional Information            Concordance: Concordant Estrogen: Positive      Progesterone: Positive    Histology Grade: G1     HER2/sloane IHC: 1+     HER2/sloane FISH: Negative           External: No                   60 minutes spent on the date of the encounter doing chart review, review of outside records, review of test results, interpretation of tests, patient visit, documentation.       I, Aviva Bess MD personally performed the services described in this documentation, as scribed by Tony Coulter in my presence, and it is both accurate and complete.    Signed by: Aivva Bess MD, MPH           Patient here  ambulatory accompanied by , Connor, for radiation consult for her breast cancer.  15 minutes spent in review of radiation process and potential side effects.  Written information given for review.  Seen by Dr. Bess.  Plan return to clinic for follow up and/or CT simulation as directed by physician.      Radiation Therapy Patient Education    Person involved with teaching: Patient and     Patient educational needs for self management of treatment-related side effects assessment completed.  EPIC Patient Ed tab contains Patient Learning Assessment    Education Materials Given  Radiation Therapy and You, Understanding Radiation Therapy, Radiation Therapy Team, Radiation Therapy Treatment, Radiation Therapy: Managing Short-Term Side Effects, Skin Care During Radiation Therapy, Radiation Therapy: Your Daily Life, Radiation Therapy to the Breast Guidelines, Welcome Letter, Insurance [place] Information and Map [facility]    Educational Topics Discussed  Side effects expected and Skin care    Response To Teaching  More review necessary    GYN Only  Vaginal Dilator-given and educated: N/A    Referrals sent: None    Chemotherapy?  No, hormone therapy with Dr. Rajput          Again, thank you for allowing me to participate in the care of your patient.        Sincerely,        Aviva Bess MD

## 2022-09-02 ENCOUNTER — APPOINTMENT (OUTPATIENT)
Dept: RADIATION ONCOLOGY | Facility: HOSPITAL | Age: 67
End: 2022-09-02
Attending: RADIOLOGY
Payer: COMMERCIAL

## 2022-09-02 PROCEDURE — 77334 RADIATION TREATMENT AID(S): CPT | Mod: 26 | Performed by: RADIOLOGY

## 2022-09-02 PROCEDURE — 77290 THER RAD SIMULAJ FIELD CPLX: CPT | Performed by: RADIOLOGY

## 2022-09-02 PROCEDURE — 77290 THER RAD SIMULAJ FIELD CPLX: CPT | Mod: 26 | Performed by: RADIOLOGY

## 2022-09-02 PROCEDURE — 77334 RADIATION TREATMENT AID(S): CPT | Performed by: RADIOLOGY

## 2022-09-02 NOTE — PROGRESS NOTES
[facility] Consult Note    Patient: Kristin Almanzar  MRN: [id number]  Date of Service: Aug 22, 2022           Reason for consultation      Problem List Items Addressed This Visit        Other    Malignant neoplasm of upper-outer quadrant of left breast in female, estrogen receptor positive (H)    Relevant Medications    anastrozole (ARIMIDEX) 1 MG tablet    Other Relevant Orders    Comprehensive metabolic panel    DX Hip/Pelvis/Spine      Other Visit Diagnoses     Long term (current) use of aromatase inhibitors         Relevant Medications    anastrozole (ARIMIDEX) 1 MG tablet    Other Relevant Orders    DX Hip/Pelvis/Spine            Assessment / number of problems addressed      1.  A very pleasant 66 year old woman with invasive ductal carcinoma left breast T2 N1 M0 ER/ND positive HER2/[place] negative status postlumpectomy.  2.  Oncotype score of 16.  3.  Good general health otherwise.  4.  Some concern regarding endocrine therapy.  5.  Some degree of anxiety.    Plan and medical decision making      I had lengthy discussion with the patient.  Reviewed with her her Oncotype score results and its its significance.  She is likely that her score is low and she would not need any adjuvant chemotherapy.  She however does need adjuvant hormonal therapy.    1.  Recommend anastrozole 1 mg p.o. daily.  2.  We need to monitor her bone density.  3.  Follow-up with me in 3 months with labs etc.  4.  She also needs radiation oncology appointment.  Discussed with her the risk of recurrence in the absence of any radiation therapy and/or endocrine therapy.  5.  Follow-up with regular doctor for other medical issues.    Clinical/pathological stage      Cancer Staging  No matching staging information was found for the patient.    History of present illness      Ms. Kristin Almanzar is a 66 year old who has been referred to us for evaluation of left-sided breast cancer diagnosed in July 2022 when she  presented for mammogram in June 2022.  The mammogram was abnormal.  She had some additional studies and then a biopsy.  The biopsy confirmed invasive ductal carcinoma.  She underwent surgery on 27th July 2022.  She had lumpectomy with sentinel lymph node biopsy done.  She had 24 mm invasive ductal carcinoma removed.  [place] lymph nodes was positive 1 out of 2 with a 6 mm deposit.  No extracapsular extension.  Margins were negative.    She has been referred here for consideration of further therapy.  She also had an Oncotype score done.  The Oncotype score is 16.    Besides hypertension she has been very healthy person.  Dr. Mcintosh is her primary care doctor.    Detailed review of systems      A 14 point review of systems was obtained.  Positive findings noted in the history.  Rest of the review of system is otherwise negative.      Past medical/surgical/social/family history        Past Medical History:   Diagnosis Date     History of anesthesia complications     vinethane - slow to wake up     Hypertension      PONV (postoperative nausea and vomiting)     as a child had vinethane slow to awake     Past Surgical History:   Procedure Laterality Date     APPENDECTOMY       COLON SURGERY Right     Colectomy - Large polyp     COLPORRHAPHY N/A 6/27/2017    Procedure:  UTEROSACRAL LIGAMENT SUSPENSION CYSTOSCOPY;  Surgeon: Nancy Girard MD;  Location: [facility] [place] [facility];  Service:      EYE SURGERY Left     Cataract     HYSTERECTOMY  06/27/2017     HYSTERECTOMY VAGINAL N/A 6/27/2017    Procedure: VAGINAL HYSTERECTOMY;  Surgeon: Nancy Girard MD;  Location: [facility] [place] [facility];  Service:      LUMPECTOMY BREAST Left 7/27/2022    Procedure: Left Lumpectpmy; [place] Lymph Node Biopsy;  Surgeon: Anastasia Allen MD;  Location: [facility]     Family History   Problem Relation Age of Onset     Colon Cancer Father 78.00     Breast Cancer Sister 41.00     Cancer Brother 55.00        Prostate     Social History  "    Socioeconomic History     Marital status:      Spouse name: None     Number of children: None     Years of education: None     Highest education level: None   Tobacco Use     Smoking status: Never Smoker     Smokeless tobacco: Never Used   Vaping Use     Vaping Use: Never used   Substance and Sexual Activity     Alcohol use: Not Currently     Comment: Alcoholic Drinks/day: rare     Drug use: No           Allergies      Allergies   Allergen Reactions     Amoxicillin Rash     Propoxyphene Rash     Red blotches on face         Physical exam        BP (!) 192/85 (BP Location: Left arm, Patient Position: Sitting, Cuff Size: Adult Regular)   Pulse 58   Temp 98.5  F (36.9  C) (Oral)   Resp 14   Ht 1.715 m (5' 7.5\")   Wt 71.5 kg (157 lb 11.2 oz)   SpO2 99%   BMI 24.33 kg/m        GENERAL: Alert and oriented to time place and person. Seated comfortably. In no distress.    HEAD: Atraumatic and normocephalic.    EYES: DEBRA, EOMI. No pallor. No icterus.    Oral cavity: no mucosal lesion or tonsillar enlargement.    NECK: supple. JVP normal.No thyroid enlargement.    LYMPH NODES: No palpable, cervical, axillary or inguinal lymphadenopathy.    CHEST: clear to auscultation bilaterally. Symmetrical breath movements bilaterally.    CVS: S1 and S2 are Regular rate and rhythm. No murmur or gallop or rub heard. No peripheral edema.    ABDOMEN: Soft. Not tender. Not distended. No palpable hepatomegaly or splenomegaly. No other mass palpable. Bowel sounds heard.    EXTREMITIES: Warm.  Minimal arthritic changes.    NEUROLOGICAL: Alert awake oriented.  Otherwise intact.  Cranial nerves appears to be preserved.    SKIN: no rash, or bruising or purpura.      Laboratory data      No results found for this or any previous visit (from the past 168 hour(s)).    [facility], [address] [place] [address], [address]   [address] [address]   CLIA#: [id number]   CASE: [id number]   GENDER: F   DATE OF " BIRTH: [id number]   PATIENT: DIETER SHOOK   ICD9 Code: C50.412, C50.412    SPECIMEN(S):   A) Breast, lumpectomy, left - oriented (15415) B) Lymph   node(s), sentinel, breast, left (17198)     CLINICAL INFORMATION:    Malignant neoplasm of upper-outer quad of L   breast in female, ER+ (H) [C50.412,  Z17.0]; left lumpectomy, SLN   biopsy.  [facility] prev path report ([id number]) and [facility] imaging rep (06/29/22)   obtained and reviewed.      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             a) Nuclear grade: Intermediate             b) Patterns: Solid and cribriform, no necrosis             c) Size: 1 mm in greatest measurement, <5% of the tumor   d) Margins: Uninvolved, at 2 mm from the nearest anterior margin, and at   greater distance from other margin        3) ADDITIONAL FINDINGS:        Background abundant adipose tissue with atrophic ductal and lobular   units, and focal weakly proliferative fibrocystic changes, with duct   ectasia and focal cyst formation, apocrine metaplasia, columnar cell   changes and focal usual ductal hyperplasia, no atypia        Benign skin, negative for Paget's disease        4) Previous biopsy site present, with cavity formation and   organizing changes     B) LEFT SENTINEL LYMPH NODE, BIOPSY:        1) ONE OF ONE LYMPH NODE (1/1) POSITIVE FOR METASTATIC CARCINOMA,            MEASURES UP TO 6 x 4 mm        2) FOCAL EXTRANODAL EXTENSION, >2 mm     PATHOLOGIC STAGE: pT2, (sn)pN1a, pM-Not applicable     See staging parameters below     SYNOPTIC REPORT:     INVASIVE CARCINOMA OF THE BREAST: RESECTION   SPECIMENS:     A: BREAST, LUMPECTOMY, LEFT - ORIENTED   B: LYMPH  NODE(S), SENTINEL, BREAST, LEFT     SPECIMEN   PROCEDURE:     EXCISION (LESS THAN TOTAL MASTECTOMY)   SPECIMEN LATERALITY:     LEFT   TUMOR   TUMOR SITE:    UPPER OUTER QUADRANT   CLOCK POSITION   1 O'CLOCK   DISTANCE FROM NIPPLE (CENTIMETERS)   3CM   HISTOLOGIC TYPE:    INVASIVE CARCINOMA OF NO SPECIAL TYPE (DUCTAL)   HISTOLOGIC GRADE:   GLANDULAR (ACINAR) / TUBULAR DIFFERENTIATION:   SCORE 2   NUCLEAR PLEOMORPHISM:    SCORE 2   MITOTIC RATE:  SCORE 1   OVERALL GRADE: GRADE 1 (SCORES OF 3, 4 OR 5)   TUMOR SIZE:    24MM X 15MM X 14MM   TUMOR FOCALITY:     SINGLE FOCUS OF INVASIVE CARCINOMA   DUCTAL CARCINOMA IN SITU (DCIS):   PRESENT   NEGATIVE FOR EXTENSIVE INTRADUCTAL COMPONENT (EIC)   SIZE (EXTENT) OF DCIS:   ESTIMATED SIZE (EXTENT) OF DCIS IS AT LEAST   1MM   NUMBER OF BLOCKS WITH DCIS:   5   NUMBER OF BLOCKS EXAMINED:    75   ARCHITECTURAL PATTERNS:  CRIBRIFORM   SOLID   NUCLEAR GRADE: GRADE II (INTERMEDIATE)   NECROSIS: NOT IDENTIFIED   LOBULAR CARCINOMA IN SITU (LCIS):  NOT IDENTIFIED   TUMOR EXTENT   SKIN:     SKIN INVASION: SKIN IS PRESENT AND UNINVOLVED   SKELETAL MUSCLE:    NO SKELETAL MUSCLE IS PRESENT   LYMPHOVASCULAR INVASION: CANNOT BE DETERMINED   FOCI SUSPICIOUS, BUT CAN NOT ENTIRELY EXCLUDE TISSUE FIXATION ARTIFACTSS   DERMAL LYMPHOVASCULAR INVASION:    NOT IDENTIFIED   MICROCALCIFICATIONS:     PRESENT IN INVASIVE CARCINOMA   PRESENT IN NON-NEOPLASTIC TISSUE   TREATMENT EFFECT IN THE BREAST:    NO KNOWN PRESURGICAL THERAPY   MARGINS   INVASIVE CARCINOMA MARGINS:   UNINVOLVED BY INVASIVE CARCINOMA   DISTANCE FROM CLOSEST MARGIN (MILLIMETERS):  2MM   CLOSEST MARGIN(S):  ANTERIOR   ANTERIOR MARGIN:    2   POSTERIOR MARGIN:   17   SUPERIOR MARGIN:    10   INFERIOR MARGIN:    17   MEDIAL MARGIN: 15   LATERAL MARGIN:     23   DCIS MARGINS:  UNINVOLVED BY DCIS   DISTANCE FROM CLOSEST MARGIN (MILLIMETERS):  2MM   CLOSEST MARGIN(S):  ANTERIOR   ANTERIOR MARGIN (MILLIMETERS):     2   POSTERIOR MARGIN  (MILLIMETERS):    17   SUPERIOR MARGIN (MILLIMETERS):     10   INFERIOR MARGIN (MILLIMETERS):     17   MEDIAL MARGIN (MILLIMETERS):  15   LATERAL MARGIN (MILLIMETERS): 23   LYMPH NODES   REGIONAL LYMPH NODES:    INVOLVED BY TUMOR CELLS   NUMBER OF LYMPH NODES WITH MACROMETASTASES (> 2 MM):   1   NUMBER OF LYMPH NODES WITH MICROMETASTASES:  0   NUMBER OF LYMPH NODES WITH ISOLATED TUMOR CELLS (<= 0.2 MM OR <= 200   CELLS):   0   SIZE OF LARGEST METASTATIC DEPOSIT (MILLIMETERS): 6MM   EXTRANODAL EXTENSION:    PRESENT   EXTENT OF EXTRANODAL EXTENSION:   GREATER THAN 2 MM   TOTAL NUMBER OF LYMPH NODES EXAMINED:   1   NUMBER OF SENTINEL NODES EXAMINED: 1   PATHOLOGIC STAGE CLASSIFICATION (PTNM, AJCC 8TH EDITION)   PRIMARY TUMOR (PT): PT2   REGIONAL LYMPH NODES MODIFIER:     (SN): SENTINEL NODE(S) EVALUATED.   REGIONAL LYMPH NODES (PN):    PN1A   ADDITIONAL FINDINGS   ADDITIONAL FINDINGS:   BACKGROUND ABUNDANT ADIPOSE TISSUE WITH ATROPHIC DUCTAL AND LOBULAR   UNITS, AND FOCAL WEAKLY PROLIFERATIVE FIBROCYSTIC CHANGES, WITH DUCT   ECTASIA AND FOCAL CYST FORMATION, APOCRINE METAPLASIA, COLUMNAR CELL   CHANGES AND FOCAL USUAL DUCTAL HYPERPLASIA, NO ATYPIA   BENIGN SKIN, NEGATIVE FOR PAGET'S DISEASE   PREVIOUS BIOPSY SITE PRESENT, WITH CAVITY FORMATION AND ORGANIZING   CHANGES   BREAST BIOMARKER TESTING PERFORMED ON PREVIOUS BIOPSY: ESTROGEN RECEPTOR   (ER) STATUS:   POSITIVE (GREATER THAN 10% OF CELLS DEMONSTRATE NUCLEAR   POSITIVITY)   PERCENTAGE OF CELLS WITH NUCLEAR POSITIVITY: 71-80%   PROGESTERONE RECEPTOR (PGR) STATUS:     POSITIVE   PERCENTAGE OF CELLS WITH NUCLEAR POSITIVITY:   81-90%   HER2 (BY IMMUNOHISTOCHEMISTRY):   NEGATIVE (SCORE 1+)   TESTING PERFORMED ON CASE NUMBER:  PERFORMED IN CORE BIOPSY, [id number]     Imaging results        CT Chest/Abdomen/Pelvis w Contrast    Result Date: 8/26/2022  EXAM: PET ONCOLOGY WHOLE BODY, CT CHEST/ABDOMEN/PELVIS W CONTRAST LOCATION: [facility]  DATE/TIME: 8/26/2022 12:42 PM INDICATION: Initial treatment planning and staging for malignant neoplasm of upper outer quadrant of left female breast. COMPARISON: Mammogram and breast ultrasound dated 07/11/2022 CONTRAST: 78 mL Isovue-370 TECHNIQUE: Serum glucose level 99 mg/dL. One hour post intravenous administration of 9.9 mCi F-18 FDG, PET imaging was performed from the skull vertex to feet, utilizing attenuation correction with concurrent axial CT and PET/CT image fusion. Separate diagnostic CT of the chest, abdomen, and pelvis was performed. Dose reduction techniques were used. PET/CT FINDINGS: Mild FDG avid soft tissue stranding in the upper outer left breast (max SUV 3.1) and left axilla (max SUV 3.6) likely representing posttreatment inflammatory change from recent left breast lumpectomy and axillary lymph node biopsy without convincing evidence of active neoplasm Degenerative right greater than left hip synovitis. CT FINDINGS: Mild senescent intracranial changes. Postoperative change of bilateral lenses. Mild paranasal sinus mucosal thickening. Thyroid goiter with non-FDG avid thyroid nodules. Mild coronary artery calcium. Right hemicolectomy and appendectomy. Hysterectomy. Pelvic phleboliths. Multilevel degenerative changes in spine. The lower extremities are unremarkable.     IMPRESSION: Findings favored to represent posttreatment inflammatory change from recent left breast lumpectomy and left axillary lymph node biopsy without convincing evidence of active neoplasm.    PET Oncology Whole Body    Result Date: 8/26/2022  EXAM: PET ONCOLOGY WHOLE BODY, CT CHEST/ABDOMEN/PELVIS W CONTRAST LOCATION: [facility] DATE/TIME: 8/26/2022 12:42 PM INDICATION: Initial treatment planning and staging for malignant neoplasm of upper outer quadrant of left female breast. COMPARISON: Mammogram and breast ultrasound dated 07/11/2022 CONTRAST: 78 mL Isovue-370 TECHNIQUE: Serum glucose level 99 mg/dL.  One hour post intravenous administration of 9.9 mCi F-18 FDG, PET imaging was performed from the skull vertex to feet, utilizing attenuation correction with concurrent axial CT and PET/CT image fusion. Separate diagnostic CT of the chest, abdomen, and pelvis was performed. Dose reduction techniques were used. PET/CT FINDINGS: Mild FDG avid soft tissue stranding in the upper outer left breast (max SUV 3.1) and left axilla (max SUV 3.6) likely representing posttreatment inflammatory change from recent left breast lumpectomy and axillary lymph node biopsy without convincing evidence of active neoplasm Degenerative right greater than left hip synovitis. CT FINDINGS: Mild senescent intracranial changes. Postoperative change of bilateral lenses. Mild paranasal sinus mucosal thickening. Thyroid goiter with non-FDG avid thyroid nodules. Mild coronary artery calcium. Right hemicolectomy and appendectomy. Hysterectomy. Pelvic phleboliths. Multilevel degenerative changes in spine. The lower extremities are unremarkable.     IMPRESSION: Findings favored to represent posttreatment inflammatory change from recent left breast lumpectomy and left axillary lymph node biopsy without convincing evidence of active neoplasm.    Images reviewed.    This note has been dictated using voice recognition software. Any grammatical or context distortions are unintentional and inherent to the software      Signed by: Quincy Rajput MD, MD

## 2022-09-13 ENCOUNTER — APPOINTMENT (OUTPATIENT)
Dept: RADIATION ONCOLOGY | Facility: HOSPITAL | Age: 67
End: 2022-09-13
Attending: RADIOLOGY
Payer: COMMERCIAL

## 2022-09-13 PROCEDURE — 77334 RADIATION TREATMENT AID(S): CPT | Mod: 26 | Performed by: RADIOLOGY

## 2022-09-13 PROCEDURE — 77295 3-D RADIOTHERAPY PLAN: CPT | Performed by: RADIOLOGY

## 2022-09-13 PROCEDURE — 77280 THER RAD SIMULAJ FIELD SMPL: CPT | Performed by: RADIOLOGY

## 2022-09-13 PROCEDURE — 77387 GUIDANCE FOR RADJ TX DLVR: CPT | Performed by: RADIOLOGY

## 2022-09-13 PROCEDURE — 77412 RADIATION TX DELIVERY LVL 3: CPT | Performed by: RADIOLOGY

## 2022-09-13 PROCEDURE — 77334 RADIATION TREATMENT AID(S): CPT | Performed by: RADIOLOGY

## 2022-09-13 PROCEDURE — 77300 RADIATION THERAPY DOSE PLAN: CPT | Mod: 26 | Performed by: RADIOLOGY

## 2022-09-13 PROCEDURE — 77280 THER RAD SIMULAJ FIELD SMPL: CPT | Mod: 26 | Performed by: RADIOLOGY

## 2022-09-13 PROCEDURE — 77300 RADIATION THERAPY DOSE PLAN: CPT | Performed by: RADIOLOGY

## 2022-09-13 PROCEDURE — 77295 3-D RADIOTHERAPY PLAN: CPT | Mod: 26 | Performed by: RADIOLOGY

## 2022-09-14 ENCOUNTER — APPOINTMENT (OUTPATIENT)
Dept: RADIATION ONCOLOGY | Facility: HOSPITAL | Age: 67
End: 2022-09-14
Attending: RADIOLOGY
Payer: COMMERCIAL

## 2022-09-14 VITALS
DIASTOLIC BLOOD PRESSURE: 77 MMHG | OXYGEN SATURATION: 99 % | TEMPERATURE: 98.5 F | BODY MASS INDEX: 24.52 KG/M2 | WEIGHT: 158.9 LBS | SYSTOLIC BLOOD PRESSURE: 183 MMHG | HEART RATE: 60 BPM | RESPIRATION RATE: 16 BRPM

## 2022-09-14 DIAGNOSIS — Z17.0 MALIGNANT NEOPLASM OF UPPER-OUTER QUADRANT OF LEFT BREAST IN FEMALE, ESTROGEN RECEPTOR POSITIVE (H): Primary | ICD-10-CM

## 2022-09-14 DIAGNOSIS — C50.412 MALIGNANT NEOPLASM OF UPPER-OUTER QUADRANT OF LEFT BREAST IN FEMALE, ESTROGEN RECEPTOR POSITIVE (H): Primary | ICD-10-CM

## 2022-09-14 PROCEDURE — 77412 RADIATION TX DELIVERY LVL 3: CPT | Performed by: RADIOLOGY

## 2022-09-14 PROCEDURE — 77387 GUIDANCE FOR RADJ TX DLVR: CPT | Performed by: RADIOLOGY

## 2022-09-14 ASSESSMENT — PAIN SCALES - GENERAL: PAINLEVEL: NO PAIN (0)

## 2022-09-14 NOTE — PROGRESS NOTES
RADIATION ONCOLOGY WEEKLY TREATMENT VISIT NOTE    Assessment:       ICD-10-CM    1. Malignant neoplasm of upper-outer quadrant of left breast in female, estrogen receptor positive (H)  C50.412     Z17.0         Impression/Plan:   66 year old female with left breast IDC grade I, 24 x 15 x 14 mm,  DCIS intermediate grade, <5% of total tumor volume, all margins negative (>2mm anterior), 1/1 SLN pos with extranodal extension >2mm. Oncotype Dx returned at 16. ER/IN pos, HER-2 neg.    1. Continue radiation therapy as prescribed.     2. Provided with radiaplex today and instructed where to apply to left breast.     3. Continue ROM exercises for next 5-6 months.     Radiation: Site: L breast & axilla  Stereotactic Radiosurgery: No  Concurrent Therapy: No  Today's Dose: 530  Total Dose for Breast: 4240  Today's Fraction/Total Fraction Breast:     Subjective:     HPI: Kristin Almanzar is a 66 year old female with Malignant neoplasm of upper-outer quadrant of left breast in female, estrogen receptor positive (H)      The following portions of the patient's history were reviewed and updated as appropriate: allergies, current medications, past family history, past medical history, past social history, past surgical history and problem list.    Assessment   Body Site:  Breast                           Site: L breast & axilla  Stereotactic Radiosurgery: No  Concurrent Therapy: No  Today's Dose: 530  Total Dose for Breast: 4240  Today's Fraction/Total Fraction Breast:                                      Sexuality Alteration                    Emotional Alteration    Copin: Effective (she is admittedly anxious, HTN, but coping okay)  Comfort Alteration   KPS: 100 % Normal, no complaints  Fatigue (ONS scale): 0: No Fatigue  Pain Location: denies   Nutrition Alteration   Anorexia: 0: None  Nausea: 0: None  Vomitin: None  Weight: 72.1 kg (158 lb 14.4 oz)  Skin Alteration   Skin Sensation: 0: No  problem  Skin Reaction: 0: None (Radiaplex given with written and verbal instructions)  AUA Assessment                                           Accompanied by       Objective:     Exam:     Vitals:    09/14/22 1201   BP: (!) 183/77   Pulse: 60   Resp: 16   Temp: 98.5  F (36.9  C)   SpO2: 99%   Weight: 72.1 kg (158 lb 14.4 oz)   PainSc: No Pain (0)       Wt Readings from Last 8 Encounters:   09/14/22 72.1 kg (158 lb 14.4 oz)   08/29/22 71.2 kg (156 lb 14.4 oz)   08/22/22 71.5 kg (157 lb 11.2 oz)   07/27/22 68.9 kg (152 lb)   07/19/22 71.7 kg (158 lb)   06/20/22 72.6 kg (160 lb)   03/25/21 74.4 kg (164 lb)   11/30/17 69.4 kg (153 lb)       General: Alert and oriented. Sitting comfortably.   Patient non acute appearing, asymptomatic. Mask on. No cough, no respiratory distress.   Kristin has no erythema.    Treatment Summary to Date    Aria chart and setup information reviewed    IAviva MD personally performed the services described in this documentation, as scribed by Tony Coulter in my presence, and it is both accurate and complete.    Signed by: Aviva Bess MD, MPH

## 2022-09-14 NOTE — LETTER
2022         RE: Kristin Almanzar  [address] [place] [address]        Dear Colleague,    Thank you for referring your patient, Kristin Almanzar, to the [facility]ATION [facility]COL[facility] [place]. Please see a copy of my visit note below.         RADIATION ONCOLOGY WEEKLY TREATMENT VISIT NOTE    Assessment:       ICD-10-CM    1. Malignant neoplasm of upper-outer quadrant of left breast in female, estrogen receptor positive (H)  C50.412     Z17.0         Impression/Plan:   66 year old female with left breast IDC grade I, 24 x 15 x 14 mm,  DCIS intermediate grade, <5% of total tumor volume, all margins negative (>2mm anterior), 1/ SLN pos with extranodal extension >2mm. Oncotype Dx returned at 16. ER/IA pos, HER-2 neg.    1. Continue radiation therapy as prescribed.     2. Provided with radiaplex today and instructed where to apply to left breast.     3. Continue ROM exercises for next 5-6 months.     Radiation: Site: L breast & axilla  Stereotactic Radiosurgery: No  Concurrent Therapy: No  Today's Dose: 530  Total Dose for Breast: 4240  Today's Fraction/Total Fraction Breast:     Subjective:     HPI: Kristin Almanzar is a 66 year old female with Malignant neoplasm of upper-outer quadrant of left breast in female, estrogen receptor positive (H)      The following portions of the patient's history were reviewed and updated as appropriate: allergies, current medications, past family history, past medical history, past social history, past surgical history and problem list.    Assessment   Body Site:  Breast                           Site: L breast & axilla  Stereotactic Radiosurgery: No  Concurrent Therapy: No  Today's Dose: 530  Total Dose for Breast: 4240  Today's Fraction/Total Fraction Breast:                                      Sexuality Alteration                    Emotional Alteration    Copin: Effective (she is admittedly anxious, HTN, but coping okay)  Comfort  Alteration   KPS: 100 % Normal, no complaints  Fatigue (ONS scale): 0: No Fatigue  Pain Location: denies   Nutrition Alteration   Anorexia: 0: None  Nausea: 0: None  Vomitin: None  Weight: 72.1 kg (158 lb 14.4 oz)  Skin Alteration   Skin Sensation: 0: No problem  Skin Reaction: 0: None (Radiaplex given with written and verbal instructions)  AUA Assessment                                           Accompanied by       Objective:     Exam:     Vitals:    22 1201   BP: (!) 183/77   Pulse: 60   Resp: 16   Temp: 98.5  F (36.9  C)   SpO2: 99%   Weight: 72.1 kg (158 lb 14.4 oz)   PainSc: No Pain (0)       Wt Readings from Last 8 Encounters:   22 72.1 kg (158 lb 14.4 oz)   22 71.2 kg (156 lb 14.4 oz)   22 71.5 kg (157 lb 11.2 oz)   22 68.9 kg (152 lb)   22 71.7 kg (158 lb)   22 72.6 kg (160 lb)   21 74.4 kg (164 lb)   17 69.4 kg (153 lb)       General: Alert and oriented. Sitting comfortably.   Patient non acute appearing, asymptomatic. Mask on. No cough, no respiratory distress.   Kristin has no erythema.    Treatment Summary to Date    Aria chart and setup information reviewed    I, Aviva Bess MD personally performed the services described in this documentation, as scribed by Tony Coulter in my presence, and it is both accurate and complete.    Signed by: Aviva Bess MD, MPH         Again, thank you for allowing me to participate in the care of your patient.        Sincerely,        Aviva Bess MD

## 2022-09-15 ENCOUNTER — APPOINTMENT (OUTPATIENT)
Dept: RADIATION ONCOLOGY | Facility: HOSPITAL | Age: 67
End: 2022-09-15
Attending: RADIOLOGY
Payer: COMMERCIAL

## 2022-09-15 PROCEDURE — 77412 RADIATION TX DELIVERY LVL 3: CPT | Performed by: RADIOLOGY

## 2022-09-15 PROCEDURE — 77387 GUIDANCE FOR RADJ TX DLVR: CPT | Performed by: RADIOLOGY

## 2022-09-16 ENCOUNTER — APPOINTMENT (OUTPATIENT)
Dept: RADIATION ONCOLOGY | Facility: HOSPITAL | Age: 67
End: 2022-09-16
Attending: RADIOLOGY
Payer: COMMERCIAL

## 2022-09-16 PROCEDURE — 77387 GUIDANCE FOR RADJ TX DLVR: CPT | Performed by: RADIOLOGY

## 2022-09-16 PROCEDURE — 77412 RADIATION TX DELIVERY LVL 3: CPT | Performed by: RADIOLOGY

## 2022-09-19 ENCOUNTER — APPOINTMENT (OUTPATIENT)
Dept: RADIATION ONCOLOGY | Facility: HOSPITAL | Age: 67
End: 2022-09-19
Attending: RADIOLOGY
Payer: COMMERCIAL

## 2022-09-19 PROCEDURE — 77387 GUIDANCE FOR RADJ TX DLVR: CPT | Performed by: RADIOLOGY

## 2022-09-19 PROCEDURE — 77412 RADIATION TX DELIVERY LVL 3: CPT | Performed by: RADIOLOGY

## 2022-09-19 PROCEDURE — 77336 RADIATION PHYSICS CONSULT: CPT | Performed by: RADIOLOGY

## 2022-09-19 PROCEDURE — 77427 RADIATION TX MANAGEMENT X5: CPT | Performed by: RADIOLOGY

## 2022-09-20 ENCOUNTER — APPOINTMENT (OUTPATIENT)
Dept: RADIATION ONCOLOGY | Facility: HOSPITAL | Age: 67
End: 2022-09-20
Attending: RADIOLOGY
Payer: COMMERCIAL

## 2022-09-20 PROCEDURE — 77387 GUIDANCE FOR RADJ TX DLVR: CPT | Performed by: STUDENT IN AN ORGANIZED HEALTH CARE EDUCATION/TRAINING PROGRAM

## 2022-09-20 PROCEDURE — 77412 RADIATION TX DELIVERY LVL 3: CPT | Performed by: STUDENT IN AN ORGANIZED HEALTH CARE EDUCATION/TRAINING PROGRAM

## 2022-09-21 ENCOUNTER — OFFICE VISIT (OUTPATIENT)
Dept: RADIATION ONCOLOGY | Facility: HOSPITAL | Age: 67
End: 2022-09-21
Attending: RADIOLOGY
Payer: COMMERCIAL

## 2022-09-21 VITALS
BODY MASS INDEX: 24.3 KG/M2 | DIASTOLIC BLOOD PRESSURE: 78 MMHG | SYSTOLIC BLOOD PRESSURE: 183 MMHG | OXYGEN SATURATION: 99 % | HEART RATE: 58 BPM | WEIGHT: 157.5 LBS | RESPIRATION RATE: 16 BRPM | TEMPERATURE: 98.1 F

## 2022-09-21 DIAGNOSIS — Z17.0 MALIGNANT NEOPLASM OF UPPER-OUTER QUADRANT OF LEFT BREAST IN FEMALE, ESTROGEN RECEPTOR POSITIVE (H): Primary | ICD-10-CM

## 2022-09-21 DIAGNOSIS — C50.412 MALIGNANT NEOPLASM OF UPPER-OUTER QUADRANT OF LEFT BREAST IN FEMALE, ESTROGEN RECEPTOR POSITIVE (H): Primary | ICD-10-CM

## 2022-09-21 PROCEDURE — 77387 GUIDANCE FOR RADJ TX DLVR: CPT | Performed by: RADIOLOGY

## 2022-09-21 PROCEDURE — 77412 RADIATION TX DELIVERY LVL 3: CPT | Performed by: RADIOLOGY

## 2022-09-21 ASSESSMENT — PAIN SCALES - GENERAL: PAINLEVEL: NO PAIN (0)

## 2022-09-21 NOTE — PROGRESS NOTES
RADIATION ONCOLOGY WEEKLY TREATMENT VISIT NOTE    Assessment:       ICD-10-CM    1. Malignant neoplasm of upper-outer quadrant of left breast in female, estrogen receptor positive (H)  C50.412     Z17.0         Impression/Plan:   66 year old female with left breast IDC grade I, 24 x 15 x 14 mm,  DCIS intermediate grade, <5% of total tumor volume, all margins negative (>2mm anterior), 1/1 SLN pos with extranodal extension >2mm. Oncotype Dx returned at 16. ER/MA pos, HER-2 neg.     1. Continue radiation therapy as prescribed. Tolerating radiation therapy well.       Radiation: Site: Left breast/axilla  Stereotactic Radiosurgery: No  Concurrent Therapy: No  Today's Dose: 1855  Total Dose for Breast: 4240  Today's Fraction/Total Fraction Breast:       Subjective:     HPI: Kristin Almanzar is a 66 year old female with Malignant neoplasm of upper-outer quadrant of left breast in female, estrogen receptor positive (H)      The following portions of the patient's history were reviewed and updated as appropriate: allergies, current medications, past family history, past medical history, past social history, past surgical history and problem list.    Assessment   Body Site:  Breast                           Site: Left breast/axilla  Stereotactic Radiosurgery: No  Concurrent Therapy: No  Today's Dose: 1855  Total Dose for Breast: 4240  Today's Fraction/Total Fraction Breast:               Emotional Alteration    Copin: Effective  Comfort Alteration   KPS: 100 % Normal, no complaints  Fatigue (ONS scale): 0: No Fatigue  Pain Location: 0   Nutrition Alteration   Anorexia: 0: None  Nausea: 0: None  Vomitin: None  Weight: 71.4 kg (157 lb 8 oz)  Skin Alteration   Skin Sensation: 0: No problem  Skin Reaction: 0: None  AUA Assessment                                           Accompanied by       Objective:     Exam:     Vitals:    22 1109   BP: (!) 183/78   Pulse: 58   Resp: 16   Temp: 98.1  F (36.7  C)    TempSrc: Oral   SpO2: 99%   Weight: 71.4 kg (157 lb 8 oz)   PainSc: No Pain (0)       Wt Readings from Last 8 Encounters:   09/21/22 71.4 kg (157 lb 8 oz)   09/14/22 72.1 kg (158 lb 14.4 oz)   08/29/22 71.2 kg (156 lb 14.4 oz)   08/22/22 71.5 kg (157 lb 11.2 oz)   07/27/22 68.9 kg (152 lb)   07/19/22 71.7 kg (158 lb)   06/20/22 72.6 kg (160 lb)   03/25/21 74.4 kg (164 lb)       General: Alert and oriented. Sitting comfortably.   Patient non acute appearing, asymptomatic. Mask on. No cough, no respiratory distress.   Kristin has no erythema.    Treatment Summary to Date    Aria chart and setup information reviewed    IAviva MD personally performed the services described in this documentation, as scribed by Tony Coulter in my presence, and it is both accurate and complete.    Signed by: Aviva Bess MD, MPH

## 2022-09-21 NOTE — LETTER
2022         RE: Kristin Almanzar  [address] [place] [address]        Dear Colleague,    Thank you for referring your patient, Kristin Almanzar, to the [facility] RADIATION ONCOLOGY [place]. Please see a copy of my visit note below.         RADIATION ONCOLOGY WEEKLY TREATMENT VISIT NOTE    Assessment:       ICD-10-CM    1. Malignant neoplasm of upper-outer quadrant of left breast in female, estrogen receptor positive (H)  C50.412     Z17.0         Impression/Plan:   66 year old female with left breast IDC grade I, 24 x 15 x 14 mm,  DCIS intermediate grade, <5% of total tumor volume, all margins negative (>2mm anterior), 1/ SLN pos with extranodal extension >2mm. Oncotype Dx returned at 16. ER/NC pos, HER-2 neg.     1. Continue radiation therapy as prescribed. Tolerating radiation therapy well.       Radiation: Site: Left breast/axilla  Stereotactic Radiosurgery: No  Concurrent Therapy: No  Today's Dose: 1855  Total Dose for Breast: 4240  Today's Fraction/Total Fraction Breast:       Subjective:     HPI: Kristin Almanzar is a 66 year old female with Malignant neoplasm of upper-outer quadrant of left breast in female, estrogen receptor positive (H)      The following portions of the patient's history were reviewed and updated as appropriate: allergies, current medications, past family history, past medical history, past social history, past surgical history and problem list.    Assessment   Body Site:  Breast                           Site: Left breast/axilla  Stereotactic Radiosurgery: No  Concurrent Therapy: No  Today's Dose: 1855  Total Dose for Breast: 4240  Today's Fraction/Total Fraction Breast:               Emotional Alteration    Copin: Effective  Comfort Alteration   KPS: 100 % Normal, no complaints  Fatigue (ONS scale): 0: No Fatigue  Pain Location: 0   Nutrition Alteration   Anorexia: 0: None  Nausea: 0: None  Vomitin: None  Weight: 71.4 kg (157 lb 8 oz)  Skin  Alteration   Skin Sensation: 0: No problem  Skin Reaction: 0: None  AUA Assessment                                           Accompanied by       Objective:     Exam:     Vitals:    09/21/22 1109   BP: (!) 183/78   Pulse: 58   Resp: 16   Temp: 98.1  F (36.7  C)   TempSrc: Oral   SpO2: 99%   Weight: 71.4 kg (157 lb 8 oz)   PainSc: No Pain (0)       Wt Readings from Last 8 Encounters:   09/21/22 71.4 kg (157 lb 8 oz)   09/14/22 72.1 kg (158 lb 14.4 oz)   08/29/22 71.2 kg (156 lb 14.4 oz)   08/22/22 71.5 kg (157 lb 11.2 oz)   07/27/22 68.9 kg (152 lb)   07/19/22 71.7 kg (158 lb)   06/20/22 72.6 kg (160 lb)   03/25/21 74.4 kg (164 lb)       General: Alert and oriented. Sitting comfortably.   Patient non acute appearing, asymptomatic. Mask on. No cough, no respiratory distress.   Kristin has no erythema.    Treatment Summary to Date    Aria chart and setup information reviewed    I, Aviva Bess MD personally performed the services described in this documentation, as scribed by Tony Coulter in my presence, and it is both accurate and complete.    Signed by: Aviva Bess MD, MPH         Again, thank you for allowing me to participate in the care of your patient.        Sincerely,        Aviva Bess MD

## 2022-09-22 ENCOUNTER — APPOINTMENT (OUTPATIENT)
Dept: RADIATION ONCOLOGY | Facility: HOSPITAL | Age: 67
End: 2022-09-22
Attending: RADIOLOGY
Payer: COMMERCIAL

## 2022-09-22 PROCEDURE — 77387 GUIDANCE FOR RADJ TX DLVR: CPT | Performed by: RADIOLOGY

## 2022-09-22 PROCEDURE — 77412 RADIATION TX DELIVERY LVL 3: CPT | Performed by: RADIOLOGY

## 2022-09-23 ENCOUNTER — APPOINTMENT (OUTPATIENT)
Dept: RADIATION ONCOLOGY | Facility: HOSPITAL | Age: 67
End: 2022-09-23
Attending: RADIOLOGY
Payer: COMMERCIAL

## 2022-09-23 PROCEDURE — 77412 RADIATION TX DELIVERY LVL 3: CPT | Performed by: RADIOLOGY

## 2022-09-23 PROCEDURE — 77387 GUIDANCE FOR RADJ TX DLVR: CPT | Performed by: RADIOLOGY

## 2022-09-25 ENCOUNTER — HEALTH MAINTENANCE LETTER (OUTPATIENT)
Age: 67
End: 2022-09-25

## 2022-09-26 ENCOUNTER — APPOINTMENT (OUTPATIENT)
Dept: RADIATION ONCOLOGY | Facility: HOSPITAL | Age: 67
End: 2022-09-26
Attending: RADIOLOGY
Payer: COMMERCIAL

## 2022-09-26 PROCEDURE — 77427 RADIATION TX MANAGEMENT X5: CPT | Performed by: RADIOLOGY

## 2022-09-26 PROCEDURE — 77387 GUIDANCE FOR RADJ TX DLVR: CPT | Performed by: RADIOLOGY

## 2022-09-26 PROCEDURE — 77336 RADIATION PHYSICS CONSULT: CPT | Performed by: RADIOLOGY

## 2022-09-26 PROCEDURE — 77412 RADIATION TX DELIVERY LVL 3: CPT | Performed by: RADIOLOGY

## 2022-09-27 ENCOUNTER — APPOINTMENT (OUTPATIENT)
Dept: RADIATION ONCOLOGY | Facility: HOSPITAL | Age: 67
End: 2022-09-27
Attending: RADIOLOGY
Payer: COMMERCIAL

## 2022-09-27 PROCEDURE — 77412 RADIATION TX DELIVERY LVL 3: CPT | Performed by: RADIOLOGY

## 2022-09-27 PROCEDURE — 77387 GUIDANCE FOR RADJ TX DLVR: CPT | Performed by: RADIOLOGY

## 2022-09-28 ENCOUNTER — APPOINTMENT (OUTPATIENT)
Dept: RADIATION ONCOLOGY | Facility: HOSPITAL | Age: 67
End: 2022-09-28
Attending: RADIOLOGY
Payer: COMMERCIAL

## 2022-09-28 VITALS
TEMPERATURE: 98.4 F | OXYGEN SATURATION: 99 % | DIASTOLIC BLOOD PRESSURE: 75 MMHG | HEART RATE: 53 BPM | BODY MASS INDEX: 24.23 KG/M2 | SYSTOLIC BLOOD PRESSURE: 187 MMHG | RESPIRATION RATE: 16 BRPM | WEIGHT: 157 LBS

## 2022-09-28 DIAGNOSIS — C50.412 MALIGNANT NEOPLASM OF UPPER-OUTER QUADRANT OF LEFT BREAST IN FEMALE, ESTROGEN RECEPTOR POSITIVE (H): Primary | ICD-10-CM

## 2022-09-28 DIAGNOSIS — Z17.0 MALIGNANT NEOPLASM OF UPPER-OUTER QUADRANT OF LEFT BREAST IN FEMALE, ESTROGEN RECEPTOR POSITIVE (H): Primary | ICD-10-CM

## 2022-09-28 PROCEDURE — 77412 RADIATION TX DELIVERY LVL 3: CPT | Performed by: RADIOLOGY

## 2022-09-28 PROCEDURE — 77387 GUIDANCE FOR RADJ TX DLVR: CPT | Performed by: RADIOLOGY

## 2022-09-28 ASSESSMENT — PAIN SCALES - GENERAL: PAINLEVEL: NO PAIN (0)

## 2022-09-28 NOTE — PROGRESS NOTES
RADIATION ONCOLOGY WEEKLY TREATMENT VISIT NOTE    Assessment:       ICD-10-CM    1. Malignant neoplasm of upper-outer quadrant of left breast in female, estrogen receptor positive (H)  C50.412     Z17.0         Impression/Plan:   66 year old female with left breast IDC grade I, 24 x 15 x 14 mm,  DCIS intermediate grade, <5% of total tumor volume, all margins negative (>2mm anterior), 1/1 SLN pos with extranodal extension >2mm. Oncotype Dx returned at 16. ER/NM pos, HER-2 neg.     1. Continue radiation therapy as prescribed. Tolerating radiation therapy well.     2. Continue applications of Radiaplex twice daily.    3. Activity modification PRN fatigue.     4. Return in 4-6 weeks PRN.    5. Long term follow up with medical oncology.     6. Follow up with primary regarding hypertension, 180s systolic here for past several weeks.       Radiation: Site: L breast & axilla  Stereotactic Radiosurgery: No  Concurrent Therapy: No  Today's Dose: 3180  Total Dose for Breast: 4240  Today's Fraction/Total Fraction Breast: 12/16      Subjective:     Radiation Treatment Summary    HISTORY: Kristin Almanzar is a 66 year old female who was treated with radiation therapy for left breast IDC.      The patient had a screening mammogram on 6/13/22, a possible mass was detected on the left breast at 1:00. Further evaluation with left breast ultrasound and diagnostic mammogram on 6/29/22 which confirmed a mass in the left breast highly suspicious for malignancy. An US guided needle biopsy was performed on 7/11/22, pathology showed IDC grade I, ER/NM pos, HER-2 negative.      A left lumpectomy was performed on 7/27/22, final pathology showed IDC grade I, 24 x 15 x 14 mm,  DCIS intermediate grade, <5% of total tumor volume, all margins negative (>2mm anterior), 1/1 SLN pos with extranodal extension >2mm. Oncotype Dx returned at 16. PET CT on 8/26/2022 negative for active neoplasm or distant disease, findings favoring posttreatment  inflammatory change.     SITE TREATED: left breast  TOTAL DOSE: 4240 cGy  NUMBER OF FRACTIONS: 16  DATES COMPLETED: 2022 - 10/4/2022  CONCURRENT CHEMOTHERAPY: No  ADJUVANT THERAPY:Yes    She tolerated the treatment without unexpected side effects.     The following portions of the patient's history were reviewed and updated as appropriate: allergies, current medications, past family history, past medical history, past social history, past surgical history and problem list.    Assessment   Body Site:  Breast                           Site: L breast & axilla  Stereotactic Radiosurgery: No  Concurrent Therapy: No  Today's Dose: 3180  Total Dose for Breast: 4240  Today's Fraction/Total Fraction Breast:                    Emotional Alteration    Copin: Effective  Comfort Alteration   KPS: 90% Can perform normal activity, minor signs of disease  Fatigue (ONS scale): 3: Mild Fatigue  Pain Location: denies   Nutrition Alteration   Anorexia: 0: None  Nausea: 0: None  Vomitin: None  Weight: 71.2 kg (157 lb)  Skin Alteration   Skin Sensation: 0: No problem  Skin Reaction: 1: Faint erythema or dry desquamation  AUA Assessment                                           Accompanied by       Objective:     Exam:     Vitals:    22 1040   BP: (!) 187/75   Pulse: 53   Resp: 16   Temp: 98.4  F (36.9  C)   SpO2: 99%   Weight: 71.2 kg (157 lb)   PainSc: No Pain (0)       Wt Readings from Last 8 Encounters:   22 71.2 kg (157 lb)   22 71.4 kg (157 lb 8 oz)   22 72.1 kg (158 lb 14.4 oz)   22 71.2 kg (156 lb 14.4 oz)   22 71.5 kg (157 lb 11.2 oz)   22 68.9 kg (152 lb)   22 71.7 kg (158 lb)   22 72.6 kg (160 lb)       General: Alert and oriented. Sitting comfortably.   Patient non acute appearing, asymptomatic. Mask on. No cough, no respiratory distress.   Kristin has grade I erythema to left breast.    Treatment Summary to Date    Aria chart and setup information  reviewed    I, Aviva Bess MD personally performed the services described in this documentation, as scribed by Tony Coulter in my presence, and it is both accurate and complete.    Signed by: Aviva Bess MD, MPH

## 2022-09-28 NOTE — LETTER
9/28/2022         RE: Kristin Almanzar  [address] [place] [address]        Dear Colleague,    Thank you for referring your patient, Kristin Almanzar, to the [facility] RADIATION ONCOLOGY [place]. Please see a copy of my visit note below.         RADIATION ONCOLOGY WEEKLY TREATMENT VISIT NOTE    Assessment:       ICD-10-CM    1. Malignant neoplasm of upper-outer quadrant of left breast in female, estrogen receptor positive (H)  C50.412     Z17.0         Impression/Plan:   66 year old female with left breast IDC grade I, 24 x 15 x 14 mm,  DCIS intermediate grade, <5% of total tumor volume, all margins negative (>2mm anterior), 1/1 SLN pos with extranodal extension >2mm. Oncotype Dx returned at 16. ER/AL pos, HER-2 neg.     1. Continue radiation therapy as prescribed. Tolerating radiation therapy well.     2. Continue applications of Radiaplex twice daily.    3. Activity modification PRN fatigue.     4. Return in 4-6 weeks PRN.    5. Long term follow up with medical oncology.     6. Follow up with primary regarding hypertension, 180s systolic here for past several weeks.       Radiation: Site: L breast & axilla  Stereotactic Radiosurgery: No  Concurrent Therapy: No  Today's Dose: 3180  Total Dose for Breast: 4240  Today's Fraction/Total Fraction Breast: 12/16      Subjective:     Radiation Treatment Summary    HISTORY: Kristin Almanzar is a 66 year old female who was treated with radiation therapy for left breast IDC.      The patient had a screening mammogram on 6/13/22, a possible mass was detected on the left breast at 1:00. Further evaluation with left breast ultrasound and diagnostic mammogram on 6/29/22 which confirmed a mass in the left breast highly suspicious for malignancy. An US guided needle biopsy was performed on 7/11/22, pathology showed IDC grade I, ER/AL pos, HER-2 negative.      A left lumpectomy was performed on 7/27/22, final pathology showed IDC grade I, 24 x 15 x 14 mm,   DCIS intermediate grade, <5% of total tumor volume, all margins negative (>2mm anterior), 1/1 SLN pos with extranodal extension >2mm. Oncotype Dx returned at 16. PET CT on 2022 negative for active neoplasm or distant disease, findings favoring posttreatment inflammatory change.     SITE TREATED: left breast  TOTAL DOSE: 4240 cGy  NUMBER OF FRACTIONS: 16  DATES COMPLETED: 2022 - 10/4/2022  CONCURRENT CHEMOTHERAPY: No  ADJUVANT THERAPY:Yes    She tolerated the treatment without unexpected side effects.     The following portions of the patient's history were reviewed and updated as appropriate: allergies, current medications, past family history, past medical history, past social history, past surgical history and problem list.    Assessment   Body Site:  Breast                           Site: L breast & axilla  Stereotactic Radiosurgery: No  Concurrent Therapy: No  Today's Dose: 3180  Total Dose for Breast: 4240  Today's Fraction/Total Fraction Breast:                    Emotional Alteration    Copin: Effective  Comfort Alteration   KPS: 90% Can perform normal activity, minor signs of disease  Fatigue (ONS scale): 3: Mild Fatigue  Pain Location: denies   Nutrition Alteration   Anorexia: 0: None  Nausea: 0: None  Vomitin: None  Weight: 71.2 kg (157 lb)  Skin Alteration   Skin Sensation: 0: No problem  Skin Reaction: 1: Faint erythema or dry desquamation  AUA Assessment                                           Accompanied by       Objective:     Exam:     Vitals:    22 1040   BP: (!) 187/75   Pulse: 53   Resp: 16   Temp: 98.4  F (36.9  C)   SpO2: 99%   Weight: 71.2 kg (157 lb)   PainSc: No Pain (0)       Wt Readings from Last 8 Encounters:   22 71.2 kg (157 lb)   22 71.4 kg (157 lb 8 oz)   22 72.1 kg (158 lb 14.4 oz)   22 71.2 kg (156 lb 14.4 oz)   22 71.5 kg (157 lb 11.2 oz)   22 68.9 kg (152 lb)   22 71.7 kg (158 lb)   22 72.6 kg (160 lb)        General: Alert and oriented. Sitting comfortably.   Patient non acute appearing, asymptomatic. Mask on. No cough, no respiratory distress.   Kristin has grade I erythema to left breast.    Treatment Summary to Date    Aria chart and setup information reviewed    I, Aviva Bess MD personally performed the services described in this documentation, as scribed by Tony Coulter in my presence, and it is both accurate and complete.    Signed by: Aviva Bess MD, MPH         Again, thank you for allowing me to participate in the care of your patient.        Sincerely,        Aviva Bess MD

## 2022-09-29 ENCOUNTER — APPOINTMENT (OUTPATIENT)
Dept: RADIATION ONCOLOGY | Facility: HOSPITAL | Age: 67
End: 2022-09-29
Attending: RADIOLOGY
Payer: COMMERCIAL

## 2022-09-29 PROCEDURE — 77387 GUIDANCE FOR RADJ TX DLVR: CPT | Performed by: RADIOLOGY

## 2022-09-29 PROCEDURE — 77412 RADIATION TX DELIVERY LVL 3: CPT | Performed by: RADIOLOGY

## 2022-09-30 ENCOUNTER — APPOINTMENT (OUTPATIENT)
Dept: RADIATION ONCOLOGY | Facility: HOSPITAL | Age: 67
End: 2022-09-30
Attending: RADIOLOGY
Payer: COMMERCIAL

## 2022-09-30 PROCEDURE — 77412 RADIATION TX DELIVERY LVL 3: CPT | Performed by: RADIOLOGY

## 2022-09-30 PROCEDURE — 77387 GUIDANCE FOR RADJ TX DLVR: CPT | Performed by: RADIOLOGY

## 2022-09-30 PROCEDURE — 77336 RADIATION PHYSICS CONSULT: CPT | Performed by: RADIOLOGY

## 2022-09-30 PROCEDURE — 77427 RADIATION TX MANAGEMENT X5: CPT | Performed by: RADIOLOGY

## 2022-10-03 ENCOUNTER — APPOINTMENT (OUTPATIENT)
Dept: RADIATION ONCOLOGY | Facility: HOSPITAL | Age: 67
End: 2022-10-03
Attending: RADIOLOGY
Payer: COMMERCIAL

## 2022-10-03 PROCEDURE — 77387 GUIDANCE FOR RADJ TX DLVR: CPT | Performed by: RADIOLOGY

## 2022-10-03 PROCEDURE — 77412 RADIATION TX DELIVERY LVL 3: CPT | Performed by: RADIOLOGY

## 2022-10-04 ENCOUNTER — ALLIED HEALTH/NURSE VISIT (OUTPATIENT)
Dept: RADIATION ONCOLOGY | Facility: HOSPITAL | Age: 67
End: 2022-10-04
Attending: RADIOLOGY
Payer: COMMERCIAL

## 2022-10-04 PROCEDURE — 77387 GUIDANCE FOR RADJ TX DLVR: CPT | Performed by: STUDENT IN AN ORGANIZED HEALTH CARE EDUCATION/TRAINING PROGRAM

## 2022-10-04 PROCEDURE — 77412 RADIATION TX DELIVERY LVL 3: CPT | Performed by: STUDENT IN AN ORGANIZED HEALTH CARE EDUCATION/TRAINING PROGRAM

## 2022-10-11 ENCOUNTER — TELEPHONE (OUTPATIENT)
Dept: RADIATION ONCOLOGY | Facility: HOSPITAL | Age: 67
End: 2022-10-11

## 2022-10-11 NOTE — TELEPHONE ENCOUNTER
Called pt at this time for a routine post radiation follow up call. I had to leave a message; told pt there is no need to call us back if they are feeling fine, but if they do have any questions or concerns to please call. Nurse line provided.   Reminded her of her f/u apt in Nov.

## 2022-10-16 DIAGNOSIS — I10 ESSENTIAL HYPERTENSION: ICD-10-CM

## 2022-10-17 ENCOUNTER — TRANSFERRED RECORDS (OUTPATIENT)
Dept: HEALTH INFORMATION MANAGEMENT | Facility: CLINIC | Age: 67
End: 2022-10-17

## 2022-10-17 RX ORDER — ATENOLOL AND CHLORTHALIDONE TABLET 50; 25 MG/1; MG/1
1 TABLET ORAL DAILY
Qty: 90 TABLET | Refills: 2 | Status: SHIPPED | OUTPATIENT
Start: 2022-10-17 | End: 2023-06-24

## 2022-10-17 NOTE — TELEPHONE ENCOUNTER
"Routing refill request to provider for review/approval because:  BP not in range.    Last Written Prescription Date:  10/18/21  Last Fill Quantity: 90,  # refills: 3   Last office visit provider:  6/20/22     Requested Prescriptions   Pending Prescriptions Disp Refills     atenolol-chlorthalidone (TENORETIC) 50-25 MG tablet [Pharmacy Med Name: ATENOLOL/CHLORTHALIDONE TABS 50/25MG] 90 tablet 3     Sig: TAKE 1 TABLET DAILY       Beta-Blockers Protocol Failed - 10/17/2022  9:54 AM        Failed - Blood pressure under 140/90 in past 12 months     BP Readings from Last 3 Encounters:   09/28/22 (!) 187/75   09/21/22 (!) 183/78 09/14/22 (!) 183/77                 Passed - Patient is age 6 or older        Passed - Recent (12 mo) or future (30 days) visit within the authorizing provider's specialty     Patient has had an office visit with the authorizing provider or a provider within the authorizing providers department within the previous 12 mos or has a future within next 30 days. See \"Patient Info\" tab in inbasket, or \"Choose Columns\" in Meds & Orders section of the refill encounter.              Passed - Medication is active on med list       Diuretics (Including Combos) Protocol Failed - 10/17/2022  9:54 AM        Failed - Blood pressure under 140/90 in past 12 months     BP Readings from Last 3 Encounters:   09/28/22 (!) 187/75   09/21/22 (!) 183/78 09/14/22 (!) 183/77                 Failed - Normal serum sodium on file in past 12 months     Recent Labs   Lab Test 06/20/22  1040   *              Passed - Recent (12 mo) or future (30 days) visit within the authorizing provider's specialty     Patient has had an office visit with the authorizing provider or a provider within the authorizing providers department within the previous 12 mos or has a future within next 30 days. See \"Patient Info\" tab in inbasket, or \"Choose Columns\" in Meds & Orders section of the refill encounter.              Passed - Medication " is active on med list        Passed - Patient is age 18 or older        Passed - No active pregancy on record        Passed - Normal serum creatinine on file in past 12 months     Recent Labs   Lab Test 08/26/22  1040   CR 0.6              Passed - Normal serum potassium on file in past 12 months     Recent Labs   Lab Test 06/20/22  1040   POTASSIUM 3.9                    Passed - No positive pregnancy test in past 12 months             Hoang Valladares RN 10/17/22 9:54 AM

## 2022-11-11 ENCOUNTER — HOSPITAL ENCOUNTER (OUTPATIENT)
Dept: BONE DENSITY | Facility: HOSPITAL | Age: 67
Discharge: HOME | End: 2022-11-11
Attending: INTERNAL MEDICINE | Admitting: INTERNAL MEDICINE
Payer: COMMERCIAL

## 2022-11-11 PROCEDURE — 77080 DXA BONE DENSITY AXIAL: CPT

## 2022-11-14 ENCOUNTER — ONCOLOGY VISIT (OUTPATIENT)
Dept: ONCOLOGY | Facility: HOSPITAL | Age: 67
End: 2022-11-14
Attending: RADIOLOGY
Payer: COMMERCIAL

## 2022-11-14 ENCOUNTER — LAB (OUTPATIENT)
Dept: INFUSION THERAPY | Facility: HOSPITAL | Age: 67
End: 2022-11-14
Attending: RADIOLOGY
Payer: COMMERCIAL

## 2022-11-14 VITALS
SYSTOLIC BLOOD PRESSURE: 173 MMHG | HEART RATE: 65 BPM | HEIGHT: 68 IN | OXYGEN SATURATION: 98 % | RESPIRATION RATE: 14 BRPM | BODY MASS INDEX: 23.52 KG/M2 | TEMPERATURE: 97.7 F | DIASTOLIC BLOOD PRESSURE: 91 MMHG | WEIGHT: 155.2 LBS

## 2022-11-14 DIAGNOSIS — Z79.811 LONG TERM (CURRENT) USE OF AROMATASE INHIBITORS: ICD-10-CM

## 2022-11-14 DIAGNOSIS — Z17.0 MALIGNANT NEOPLASM OF UPPER-OUTER QUADRANT OF LEFT BREAST IN FEMALE, ESTROGEN RECEPTOR POSITIVE (H): Primary | ICD-10-CM

## 2022-11-14 DIAGNOSIS — C50.412 MALIGNANT NEOPLASM OF UPPER-OUTER QUADRANT OF LEFT BREAST IN FEMALE, ESTROGEN RECEPTOR POSITIVE (H): ICD-10-CM

## 2022-11-14 DIAGNOSIS — C50.412 MALIGNANT NEOPLASM OF UPPER-OUTER QUADRANT OF LEFT BREAST IN FEMALE, ESTROGEN RECEPTOR POSITIVE (H): Primary | ICD-10-CM

## 2022-11-14 DIAGNOSIS — Z17.0 MALIGNANT NEOPLASM OF UPPER-OUTER QUADRANT OF LEFT BREAST IN FEMALE, ESTROGEN RECEPTOR POSITIVE (H): ICD-10-CM

## 2022-11-14 LAB
ALBUMIN SERPL BCG-MCNC: 4.6 G/DL (ref 3.5–5.2)
ALP SERPL-CCNC: 55 U/L (ref 35–104)
ALT SERPL W P-5'-P-CCNC: 18 U/L (ref 10–35)
ANION GAP SERPL CALCULATED.3IONS-SCNC: 10 MMOL/L (ref 7–15)
AST SERPL W P-5'-P-CCNC: 17 U/L (ref 10–35)
BILIRUB SERPL-MCNC: 0.4 MG/DL
BUN SERPL-MCNC: 12.2 MG/DL (ref 8–23)
CALCIUM SERPL-MCNC: 10.2 MG/DL (ref 8.8–10.2)
CHLORIDE SERPL-SCNC: 96 MMOL/L (ref 98–107)
CREAT SERPL-MCNC: 0.61 MG/DL (ref 0.51–0.95)
DEPRECATED HCO3 PLAS-SCNC: 30 MMOL/L (ref 22–29)
GFR SERPL CREATININE-BSD FRML MDRD: >90 ML/MIN/1.73M2
GLUCOSE SERPL-MCNC: 104 MG/DL (ref 70–99)
POTASSIUM SERPL-SCNC: 3.9 MMOL/L (ref 3.4–5.3)
PROT SERPL-MCNC: 6.9 G/DL (ref 6.4–8.3)
SODIUM SERPL-SCNC: 136 MMOL/L (ref 136–145)

## 2022-11-14 PROCEDURE — 36415 COLL VENOUS BLD VENIPUNCTURE: CPT

## 2022-11-14 PROCEDURE — 99214 OFFICE O/P EST MOD 30 MIN: CPT | Performed by: INTERNAL MEDICINE

## 2022-11-14 PROCEDURE — G0463 HOSPITAL OUTPT CLINIC VISIT: HCPCS

## 2022-11-14 PROCEDURE — 80053 COMPREHEN METABOLIC PANEL: CPT

## 2022-11-14 ASSESSMENT — PAIN SCALES - GENERAL: PAINLEVEL: NO PAIN (0)

## 2022-11-14 NOTE — PROGRESS NOTES
[facility] Hematology and Oncology Consult Note    Patient: Kristin Almanzar  MRN: [id number]  Date of Service: Nov 14, 2022           Reason for consultation      Problem List Items Addressed This Visit        Other    Malignant neoplasm of upper-outer quadrant of left breast in female, estrogen receptor positive (H) - Primary   Other Visit Diagnoses     Long term (current) use of aromatase inhibitors                 Assessment / number of problems addressed      1.  A very pleasant 67 year old woman with invasive ductal carcinoma left breast T2 N1 M0 ER/NJ positive HER2/[place] negative status postlumpectomy.  Started on anastrozole in the middle of October 2022.  2.  Oncotype score of 16.  3.  Good general health otherwise.  4.  Some concern regarding endocrine therapy.  5.  Some degree of anxiety.  6.  Slight vaginal dryness secondary to anastrozole.    Plan and medical decision making      1.  Continue anastrozole 1 mg p.o. daily.  At least for total of 5 years until October 2027.  2.  Use over-the-counter vaginal lubricating agents.  3.  Need to monitor her bone density.  Next bone density sometime in November 2024 or later.  4.  Follow-up with Dr. Obrien regarding the blood pressure.  4.  Continue good diet and exercise.  6.  Range of movement exercises on her left shoulder.    Clinical/pathological stage       Cancer Staging   No matching staging information was found for the patient.    History of present illness      Ms. Kristin Almanzar is a 67 year old who has been referred to us for evaluation of left-sided breast cancer diagnosed in July 2022 when she presented for mammogram in June 2022.  The mammogram was abnormal.  She had some additional studies and then a biopsy.  The biopsy confirmed invasive ductal carcinoma.  She underwent surgery on 27th July 2022.  She had lumpectomy with sentinel lymph node biopsy done.  She had 24 mm invasive ductal carcinoma removed.  [place] lymph nodes was positive  1 out of 2 with a 6 mm deposit.  No extracapsular extension.  Margins were negative.    She has been referred here for consideration of further therapy.  She also had an Oncotype score done.  The Oncotype score is 16.    Besides hypertension she has been very healthy person.  Dr. Obrien is her primary care doctor.    Finished radiation in October 2022.  Started on anastrozole after that.  So far seems to be tolerating it okay.  Did do a bone density before starting and it was normal.    Detailed review of systems      A 14 point review of systems was obtained.  Positive findings noted in the history.  Rest of the review of system is otherwise negative.      Past medical/surgical/social/family history        Past Medical History:   Diagnosis Date     History of anesthesia complications     vinethane - slow to wake up     Hypertension      PONV (postoperative nausea and vomiting)     as a child had vinethane slow to awake     Past Surgical History:   Procedure Laterality Date     APPENDECTOMY       COLON SURGERY Right     Colectomy - Large polyp     COLPORRHAPHY N/A 6/27/2017    Procedure:  UTEROSACRAL LIGAMENT SUSPENSION CYSTOSCOPY;  Surgeon: Nancy Girard MD;  Location: [facility];  Service:      EYE SURGERY Left     Cataract     HYSTERECTOMY  06/27/2017     HYSTERECTOMY VAGINAL N/A 6/27/2017    Procedure: VAGINAL HYSTERECTOMY;  Surgeon: Nancy Girard MD;  Location: [facility];  Service:      LUMPECTOMY BREAST Left 7/27/2022    Procedure: Left Lumpectpmy; [place] Lymph Node Biopsy;  Surgeon: Anastasia Allen MD;  Location: [facility]     Family History   Problem Relation Age of Onset     Colon Cancer Father 78.00     Breast Cancer Sister 41.00     Cancer Brother 55.00        Prostate       Allergies      Allergies   Allergen Reactions     Amoxicillin Rash     Propoxyphene Rash     Red blotches on face       Physical exam        BP (!) 173/91 (BP Location: Right arm, Patient Position:  "Sitting, Cuff Size: Adult Regular)   Pulse 65   Temp 97.7  F (36.5  C) (Tympanic)   Resp 14   Ht 1.721 m (5' 7.75\")   Wt 70.4 kg (155 lb 3.2 oz)   SpO2 98%   BMI 23.77 kg/m      GENERAL: No acute distress. Cooperative in conversation.   HEENT:  Pupils are equal, round and reactive. Oral mucosa is clean and intact. No ulcerations or mucositis noted. No bleeding noted.  RESP:Chest symmetric lungs are clear bilaterally per auscultation. Regular respiratory rate. No wheezes or rhonchi.  CV: Normal S1 S2 Regular, rate and rhythm. No murmurs.    ABD: Nondistended, soft, nontender. Positive bowel sounds. No organomegaly.   EXTREMITIES: No lower extremity edema.   NEURO: Non- focal. Alert and oriented x3.  Cranial nerves appear intact.  PSYCH: Within normal limits. No depression or anxiety.  SKIN: Warm dry intact.       Laboratory data      Recent Results (from the past 168 hour(s))   Comprehensive metabolic panel   Result Value Ref Range    Sodium 136 136 - 145 mmol/L    Potassium 3.9 3.4 - 5.3 mmol/L    Chloride 96 (L) 98 - 107 mmol/L    Carbon Dioxide (CO2) 30 (H) 22 - 29 mmol/L    Anion Gap 10 7 - 15 mmol/L    Urea Nitrogen 12.2 8.0 - 23.0 mg/dL    Creatinine 0.61 0.51 - 0.95 mg/dL    Calcium 10.2 8.8 - 10.2 mg/dL    Glucose 104 (H) 70 - 99 mg/dL    Alkaline Phosphatase 55 35 - 104 U/L    AST 17 10 - 35 U/L    ALT 18 10 - 35 U/L    Protein Total 6.9 6.4 - 8.3 g/dL    Albumin 4.6 3.5 - 5.2 g/dL    Bilirubin Total 0.4 <=1.2 mg/dL    GFR Estimate >90 >60 mL/min/1.73m2       [facility], [address] [place] [address], [address]   [address]#: [id number]   CASE: [id number]   GENDER: F   DATE OF BIRTH: 1955   PATIENT: DIETER SHOOK   ICD9 Code: C50.412, C50.412    SPECIMEN(S):   A) Breast, lumpectomy, left - oriented (22798) B) Lymph   node(s), sentinel, breast, left (21630)     CLINICAL INFORMATION:    Malignant neoplasm of upper-outer quad of L   breast in female, " ER+ (H) [C50.412,  Z17.0]; left lumpectomy, SLN   biopsy.  [facility] prev path report ([id number]) and [facility] imaging rep (06/29/22)   obtained and reviewed.      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             a) Nuclear grade: Intermediate             b) Patterns: Solid and cribriform, no necrosis             c) Size: 1 mm in greatest measurement, <5% of the tumor   d) Margins: Uninvolved, at 2 mm from the nearest anterior margin, and at   greater distance from other margin        3) ADDITIONAL FINDINGS:        Background abundant adipose tissue with atrophic ductal and lobular   units, and focal weakly proliferative fibrocystic changes, with duct   ectasia and focal cyst formation, apocrine metaplasia, columnar cell   changes and focal usual ductal hyperplasia, no atypia        Benign skin, negative for Paget's disease        4) Previous biopsy site present, with cavity formation and   organizing changes     B) LEFT SENTINEL LYMPH NODE, BIOPSY:        1) ONE OF ONE LYMPH NODE (1/1) POSITIVE FOR METASTATIC CARCINOMA,            MEASURES UP TO 6 x 4 mm        2) FOCAL EXTRANODAL EXTENSION, >2 mm     PATHOLOGIC STAGE: pT2, (sn)pN1a, pM-Not applicable     See staging parameters below     SYNOPTIC REPORT:     INVASIVE CARCINOMA OF THE BREAST: RESECTION   SPECIMENS:     A: BREAST, LUMPECTOMY, LEFT - ORIENTED   B: LYMPH NODE(S), SENTINEL, BREAST, LEFT     SPECIMEN   PROCEDURE:     EXCISION (LESS THAN TOTAL MASTECTOMY)   SPECIMEN LATERALITY:     LEFT   TUMOR   TUMOR SITE:    UPPER OUTER QUADRANT   CLOCK POSITION   1 O'CLOCK   DISTANCE FROM NIPPLE (CENTIMETERS)   3CM   HISTOLOGIC TYPE:    INVASIVE  CARCINOMA OF NO SPECIAL TYPE (DUCTAL)   HISTOLOGIC GRADE:   GLANDULAR (ACINAR) / TUBULAR DIFFERENTIATION:   SCORE 2   NUCLEAR PLEOMORPHISM:    SCORE 2   MITOTIC RATE:  SCORE 1   OVERALL GRADE: GRADE 1 (SCORES OF 3, 4 OR 5)   TUMOR SIZE:    24MM X 15MM X 14MM   TUMOR FOCALITY:     SINGLE FOCUS OF INVASIVE CARCINOMA   DUCTAL CARCINOMA IN SITU (DCIS):   PRESENT   NEGATIVE FOR EXTENSIVE INTRADUCTAL COMPONENT (EIC)   SIZE (EXTENT) OF DCIS:   ESTIMATED SIZE (EXTENT) OF DCIS IS AT LEAST   1MM   NUMBER OF BLOCKS WITH DCIS:   5   NUMBER OF BLOCKS EXAMINED:    75   ARCHITECTURAL PATTERNS:  CRIBRIFORM   SOLID   NUCLEAR GRADE: GRADE II (INTERMEDIATE)   NECROSIS: NOT IDENTIFIED   LOBULAR CARCINOMA IN SITU (LCIS):  NOT IDENTIFIED   TUMOR EXTENT   SKIN:     SKIN INVASION: SKIN IS PRESENT AND UNINVOLVED   SKELETAL MUSCLE:    NO SKELETAL MUSCLE IS PRESENT   LYMPHOVASCULAR INVASION: CANNOT BE DETERMINED   FOCI SUSPICIOUS, BUT CAN NOT ENTIRELY EXCLUDE TISSUE FIXATION ARTIFACTSS   DERMAL LYMPHOVASCULAR INVASION:    NOT IDENTIFIED   MICROCALCIFICATIONS:     PRESENT IN INVASIVE CARCINOMA   PRESENT IN NON-NEOPLASTIC TISSUE   TREATMENT EFFECT IN THE BREAST:    NO KNOWN PRESURGICAL THERAPY   MARGINS   INVASIVE CARCINOMA MARGINS:   UNINVOLVED BY INVASIVE CARCINOMA   DISTANCE FROM CLOSEST MARGIN (MILLIMETERS):  2MM   CLOSEST MARGIN(S):  ANTERIOR   ANTERIOR MARGIN:    2   POSTERIOR MARGIN:   17   SUPERIOR MARGIN:    10   INFERIOR MARGIN:    17   MEDIAL MARGIN: 15   LATERAL MARGIN:     23   DCIS MARGINS:  UNINVOLVED BY DCIS   DISTANCE FROM CLOSEST MARGIN (MILLIMETERS):  2MM   CLOSEST MARGIN(S):  ANTERIOR   ANTERIOR MARGIN (MILLIMETERS):     2   POSTERIOR MARGIN (MILLIMETERS):    17   SUPERIOR MARGIN (MILLIMETERS):     10   INFERIOR MARGIN (MILLIMETERS):     17   MEDIAL MARGIN (MILLIMETERS):  15   LATERAL MARGIN (MILLIMETERS): 23   LYMPH NODES   REGIONAL LYMPH NODES:    INVOLVED BY TUMOR CELLS   NUMBER OF LYMPH NODES WITH MACROMETASTASES (> 2  MM):   1   NUMBER OF LYMPH NODES WITH MICROMETASTASES:  0   NUMBER OF LYMPH NODES WITH ISOLATED TUMOR CELLS (<= 0.2 MM OR <= 200   CELLS):   0   SIZE OF LARGEST METASTATIC DEPOSIT (MILLIMETERS): 6MM   EXTRANODAL EXTENSION:    PRESENT   EXTENT OF EXTRANODAL EXTENSION:   GREATER THAN 2 MM   TOTAL NUMBER OF LYMPH NODES EXAMINED:   1   NUMBER OF SENTINEL NODES EXAMINED: 1   PATHOLOGIC STAGE CLASSIFICATION (PTNM, AJCC 8TH EDITION)   PRIMARY TUMOR (PT): PT2   REGIONAL LYMPH NODES MODIFIER:     (SN): SENTINEL NODE(S) EVALUATED.   REGIONAL LYMPH NODES (PN):    PN1A   ADDITIONAL FINDINGS   ADDITIONAL FINDINGS:   BACKGROUND ABUNDANT ADIPOSE TISSUE WITH ATROPHIC DUCTAL AND LOBULAR   UNITS, AND FOCAL WEAKLY PROLIFERATIVE FIBROCYSTIC CHANGES, WITH DUCT   ECTASIA AND FOCAL CYST FORMATION, APOCRINE METAPLASIA, COLUMNAR CELL   CHANGES AND FOCAL USUAL DUCTAL HYPERPLASIA, NO ATYPIA   BENIGN SKIN, NEGATIVE FOR PAGET'S DISEASE   PREVIOUS BIOPSY SITE PRESENT, WITH CAVITY FORMATION AND ORGANIZING   CHANGES   BREAST BIOMARKER TESTING PERFORMED ON PREVIOUS BIOPSY: ESTROGEN RECEPTOR   (ER) STATUS:   POSITIVE (GREATER THAN 10% OF CELLS DEMONSTRATE NUCLEAR   POSITIVITY)   PERCENTAGE OF CELLS WITH NUCLEAR POSITIVITY: 71-80%   PROGESTERONE RECEPTOR (PGR) STATUS:     POSITIVE   PERCENTAGE OF CELLS WITH NUCLEAR POSITIVITY:   81-90%   HER2 (BY IMMUNOHISTOCHEMISTRY):   NEGATIVE (SCORE 1+)   TESTING PERFORMED ON CASE NUMBER:  PERFORMED IN CORE BIOPSY, [id number]     Imaging results        DX Hip/Pelvis/Spine    Result Date: 11/11/2022  EXAM: DX HIP/PELVIS/SPINE LOCATION: [facility] DATE/TIME: 11/11/2022 8:04 AM INDICATION: Malignant neoplasm of upper-outer quadrant of left breast in female, estrogen receptor positive (H), Malignant neoplasm of upper-outer quadrant of left breast in female, estrogen receptor positive (H), Long term (current) use of aromatase inhibitors. COMPARISON: 12/09/2016. TECHNIQUE: Dual-energy x-ray  absorptiometry performed with routine technique. FINDINGS: Lumbar Spine: L1-L4 (L3): BMD: 1.074 g/cm2. T-score: -0.8. Z-score: 0.8 RIGHT Hip Total: BMD: 0.997 g/cm2. T-score: -0.1. Z-score: 1.2 RIGHT Hip Femoral neck: BMD: 1.052 g/cm2. T-score: 0.1. Z-score: 1.7 LEFT Hip Total: BMD: 1.096 g/cm2. T-score: 0.7. Z-score: 2.0 LEFT Hip Femoral neck: BMD: 1.098 g/cm2. T-score: 0.4. Z-score: 2.0 WHO Criteria: Normal: T score at or above -1 SD Osteopenia: T score between -1 and -2.5 SD Osteoporosis: T score at or below -2.5 SD COMPARISON: There has been a 5.8% decrease in lumbar spine BMD. There has been a 12.6% decrease in bilateral hip BMD. FRAX Results: Not applicable due to Normal bone mineral density.     IMPRESSION: NORMAL. Bone mineral density measurements are within normal limits using T score.      This note has been dictated using voice recognition software. Any grammatical or context distortions are unintentional and inherent to the software      Signed by: Quincy Rajput MD, MD

## 2022-11-14 NOTE — LETTER
"    11/14/2022         RE: Kristin Almanzar  [address] [place] [address]        Dear Colleague,    Thank you for referring your patient, Kristin Almanzar, to the [facility] [place]. Please see a copy of my visit note below.    Oncology Rooming Note    November 14, 2022 2:13 PM   Kristin Almanzar is a 67 year old female who presents for:    Chief Complaint   Patient presents with     Oncology Clinic Visit     4 week follow up with labs and prior DEXA review related to Malignant neoplasm of upper-outer quadrant of left breast in female, estrogen receptor positive     Initial Vitals: BP (!) 173/91 (BP Location: Right arm, Patient Position: Sitting, Cuff Size: Adult Regular)   Pulse 65   Temp 97.7  F (36.5  C) (Tympanic)   Resp 14   Ht 1.721 m (5' 7.75\")   Wt 70.4 kg (155 lb 3.2 oz)   SpO2 98%   BMI 23.77 kg/m   Estimated body mass index is 23.77 kg/m  as calculated from the following:    Height as of this encounter: 1.721 m (5' 7.75\").    Weight as of this encounter: 70.4 kg (155 lb 3.2 oz). Body surface area is 1.83 meters squared.  No Pain (0) Comment: Data Unavailable   No LMP recorded. Patient is postmenopausal.  Allergies reviewed: Yes  Medications reviewed: Yes    Medications: Medication refills not needed today.  Pharmacy name entered into EPIC:    [facility] - [address] DRUG STORE #[id number] - [place] [address] [address] AT [place] [address] & [facility]  [facility] - [address]    Clinical concerns: 4 week follow up with labs and prior DEXA review related to Malignant neoplasm of upper-outer quadrant of left breast in female, estrogen receptor positive    MEENA PAREDES CMA              [facility] Hematology and Oncology Consult Note    Patient: Kristin Almanzar  MRN: [id number]  Date of Service: Nov 14, 2022           Reason for consultation    "   Problem List Items Addressed This Visit        Other    Malignant neoplasm of upper-outer quadrant of left breast in female, estrogen receptor positive (H) - Primary   Other Visit Diagnoses     Long term (current) use of aromatase inhibitors                 Assessment / number of problems addressed      1.  A very pleasant 67 year old woman with invasive ductal carcinoma left breast T2 N1 M0 ER/DE positive HER2/sloane negative status postlumpectomy.  Started on anastrozole in the middle of October 2022.  2.  Oncotype score of 16.  3.  Good general health otherwise.  4.  Some concern regarding endocrine therapy.  5.  Some degree of anxiety.  6.  Slight vaginal dryness secondary to anastrozole.    Plan and medical decision making      1.  Continue anastrozole 1 mg p.o. daily.  At least for total of 5 years until October 2027.  2.  Use over-the-counter vaginal lubricating agents.  3.  Need to monitor her bone density.  Next bone density sometime in November 2024 or later.  4.  Follow-up with Dr. Obrien regarding the blood pressure.  4.  Continue good diet and exercise.  6.  Range of movement exercises on her left shoulder.    Clinical/pathological stage       Cancer Staging   No matching staging information was found for the patient.    History of present illness      Ms. Kristin Almanzar is a 67 year old who has been referred to us for evaluation of left-sided breast cancer diagnosed in July 2022 when she presented for mammogram in June 2022.  The mammogram was abnormal.  She had some additional studies and then a biopsy.  The biopsy confirmed invasive ductal carcinoma.  She underwent surgery on 27th July 2022.  She had lumpectomy with sentinel lymph node biopsy done.  She had 24 mm invasive ductal carcinoma removed.  [place] lymph nodes was positive 1 out of 2 with a 6 mm deposit.  No extracapsular extension.  Margins were negative.    She has been referred here for consideration of further therapy.  She also had  "an Oncotype score done.  The Oncotype score is 16.    Besides hypertension she has been very healthy person.  Dr. Obrien is her primary care doctor.    Finished radiation in October 2022.  Started on anastrozole after that.  So far seems to be tolerating it okay.  Did do a bone density before starting and it was normal.    Detailed review of systems      A 14 point review of systems was obtained.  Positive findings noted in the history.  Rest of the review of system is otherwise negative.      Past medical/surgical/social/family history        Past Medical History:   Diagnosis Date     History of anesthesia complications     vinethane - slow to wake up     Hypertension      PONV (postoperative nausea and vomiting)     as a child had vinethane slow to awake     Past Surgical History:   Procedure Laterality Date     APPENDECTOMY       COLON SURGERY Right     Colectomy - Large polyp     COLPORRHAPHY N/A 6/27/2017    Procedure:  UTEROSACRAL LIGAMENT SUSPENSION CYSTOSCOPY;  Surgeon: Nancy Girard MD;  Location: [facility];  Service:      EYE SURGERY Left     Cataract     HYSTERECTOMY  06/27/2017     HYSTERECTOMY VAGINAL N/A 6/27/2017    Procedure: VAGINAL HYSTERECTOMY;  Surgeon: Nancy Girard MD;  Location: [facility];  Service:      LUMPECTOMY BREAST Left 7/27/2022    Procedure: Left Lumpectpmy; [place] Lymph Node Biopsy;  Surgeon: Anastasia Allen MD;  [facility]     Family History   Problem Relation Age of Onset     Colon Cancer Father 78.00     Breast Cancer Sister 41.00     Cancer Brother 55.00        Prostate       Allergies      Allergies   Allergen Reactions     Amoxicillin Rash     Propoxyphene Rash     Red blotches on face       Physical exam        BP (!) 173/91 (BP Location: Right arm, Patient Position: Sitting, Cuff Size: Adult Regular)   Pulse 65   Temp 97.7  F (36.5  C) (Tympanic)   Resp 14   Ht 1.721 m (5' 7.75\")   Wt 70.4 kg (155 lb 3.2 oz)   SpO2 98%   " BMI 23.77 kg/m      GENERAL: No acute distress. Cooperative in conversation.   HEENT:  Pupils are equal, round and reactive. Oral mucosa is clean and intact. No ulcerations or mucositis noted. No bleeding noted.  RESP:Chest symmetric lungs are clear bilaterally per auscultation. Regular respiratory rate. No wheezes or rhonchi.  CV: Normal S1 S2 Regular, rate and rhythm. No murmurs.    ABD: Nondistended, soft, nontender. Positive bowel sounds. No organomegaly.   EXTREMITIES: No lower extremity edema.   NEURO: Non- focal. Alert and oriented x3.  Cranial nerves appear intact.  PSYCH: Within normal limits. No depression or anxiety.  SKIN: Warm dry intact.       Laboratory data      Recent Results (from the past 168 hour(s))   Comprehensive metabolic panel   Result Value Ref Range    Sodium 136 136 - 145 mmol/L    Potassium 3.9 3.4 - 5.3 mmol/L    Chloride 96 (L) 98 - 107 mmol/L    Carbon Dioxide (CO2) 30 (H) 22 - 29 mmol/L    Anion Gap 10 7 - 15 mmol/L    Urea Nitrogen 12.2 8.0 - 23.0 mg/dL    Creatinine 0.61 0.51 - 0.95 mg/dL    Calcium 10.2 8.8 - 10.2 mg/dL    Glucose 104 (H) 70 - 99 mg/dL    Alkaline Phosphatase 55 35 - 104 U/L    AST 17 10 - 35 U/L    ALT 18 10 - 35 U/L    Protein Total 6.9 6.4 - 8.3 g/dL    Albumin 4.6 3.5 - 5.2 g/dL    Bilirubin Total 0.4 <=1.2 mg/dL    GFR Estimate >90 >60 mL/min/1.73m2       [facility], [address] [place] [address], [facility]   [address] [address]   CLIA#: [id number]   CASE: [id number]   GENDER: F   DATE OF BIRTH: 1955   PATIENT: DIETER SHOOK   ICD9 Code: C50.412, C50.412    SPECIMEN(S):   A) Breast, lumpectomy, left - oriented (78209) B) Lymph   node(s), sentinel, breast, left (98117)     CLINICAL INFORMATION:    Malignant neoplasm of upper-outer quad of L   breast in female, ER+ (H) [C50.412,  Z17.0]; left lumpectomy, SLN   biopsy.  [facility] prev path report ([id number]) and [facility] imaging rep (06/29/22)   obtained and reviewed.      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             a) Nuclear grade: Intermediate             b) Patterns: Solid and cribriform, no necrosis             c) Size: 1 mm in greatest measurement, <5% of the tumor   d) Margins: Uninvolved, at 2 mm from the nearest anterior margin, and at   greater distance from other margin        3) ADDITIONAL FINDINGS:        Background abundant adipose tissue with atrophic ductal and lobular   units, and focal weakly proliferative fibrocystic changes, with duct   ectasia and focal cyst formation, apocrine metaplasia, columnar cell   changes and focal usual ductal hyperplasia, no atypia        Benign skin, negative for Paget's disease        4) Previous biopsy site present, with cavity formation and   organizing changes     B) LEFT SENTINEL LYMPH NODE, BIOPSY:        1) ONE OF ONE LYMPH NODE (1/1) POSITIVE FOR METASTATIC CARCINOMA,            MEASURES UP TO 6 x 4 mm        2) FOCAL EXTRANODAL EXTENSION, >2 mm     PATHOLOGIC STAGE: pT2, (sn)pN1a, pM-Not applicable     See staging parameters below     SYNOPTIC REPORT:     INVASIVE CARCINOMA OF THE BREAST: RESECTION   SPECIMENS:     A: BREAST, LUMPECTOMY, LEFT - ORIENTED   B: LYMPH NODE(S), SENTINEL, BREAST, LEFT     SPECIMEN   PROCEDURE:     EXCISION (LESS THAN TOTAL MASTECTOMY)   SPECIMEN LATERALITY:     LEFT   TUMOR   TUMOR SITE:    UPPER OUTER QUADRANT   CLOCK POSITION   1 O'CLOCK   DISTANCE FROM NIPPLE (CENTIMETERS)   3CM   HISTOLOGIC TYPE:    INVASIVE CARCINOMA OF NO SPECIAL TYPE (DUCTAL)   HISTOLOGIC GRADE:   GLANDULAR (ACINAR) / TUBULAR DIFFERENTIATION:   SCORE 2   NUCLEAR PLEOMORPHISM:    SCORE 2   MITOTIC  RATE:  SCORE 1   OVERALL GRADE: GRADE 1 (SCORES OF 3, 4 OR 5)   TUMOR SIZE:    24MM X 15MM X 14MM   TUMOR FOCALITY:     SINGLE FOCUS OF INVASIVE CARCINOMA   DUCTAL CARCINOMA IN SITU (DCIS):   PRESENT   NEGATIVE FOR EXTENSIVE INTRADUCTAL COMPONENT (EIC)   SIZE (EXTENT) OF DCIS:   ESTIMATED SIZE (EXTENT) OF DCIS IS AT LEAST   1MM   NUMBER OF BLOCKS WITH DCIS:   5   NUMBER OF BLOCKS EXAMINED:    75   ARCHITECTURAL PATTERNS:  CRIBRIFORM   SOLID   NUCLEAR GRADE: GRADE II (INTERMEDIATE)   NECROSIS: NOT IDENTIFIED   LOBULAR CARCINOMA IN SITU (LCIS):  NOT IDENTIFIED   TUMOR EXTENT   SKIN:     SKIN INVASION: SKIN IS PRESENT AND UNINVOLVED   SKELETAL MUSCLE:    NO SKELETAL MUSCLE IS PRESENT   LYMPHOVASCULAR INVASION: CANNOT BE DETERMINED   FOCI SUSPICIOUS, BUT CAN NOT ENTIRELY EXCLUDE TISSUE FIXATION ARTIFACTSS   DERMAL LYMPHOVASCULAR INVASION:    NOT IDENTIFIED   MICROCALCIFICATIONS:     PRESENT IN INVASIVE CARCINOMA   PRESENT IN NON-NEOPLASTIC TISSUE   TREATMENT EFFECT IN THE BREAST:    NO KNOWN PRESURGICAL THERAPY   MARGINS   INVASIVE CARCINOMA MARGINS:   UNINVOLVED BY INVASIVE CARCINOMA   DISTANCE FROM CLOSEST MARGIN (MILLIMETERS):  2MM   CLOSEST MARGIN(S):  ANTERIOR   ANTERIOR MARGIN:    2   POSTERIOR MARGIN:   17   SUPERIOR MARGIN:    10   INFERIOR MARGIN:    17   MEDIAL MARGIN: 15   LATERAL MARGIN:     23   DCIS MARGINS:  UNINVOLVED BY DCIS   DISTANCE FROM CLOSEST MARGIN (MILLIMETERS):  2MM   CLOSEST MARGIN(S):  ANTERIOR   ANTERIOR MARGIN (MILLIMETERS):     2   POSTERIOR MARGIN (MILLIMETERS):    17   SUPERIOR MARGIN (MILLIMETERS):     10   INFERIOR MARGIN (MILLIMETERS):     17   MEDIAL MARGIN (MILLIMETERS):  15   LATERAL MARGIN (MILLIMETERS): 23   LYMPH NODES   REGIONAL LYMPH NODES:    INVOLVED BY TUMOR CELLS   NUMBER OF LYMPH NODES WITH MACROMETASTASES (> 2 MM):   1   NUMBER OF LYMPH NODES WITH MICROMETASTASES:  0   NUMBER OF LYMPH NODES WITH ISOLATED TUMOR CELLS (<= 0.2 MM OR <= 200   CELLS):   0   SIZE OF LARGEST  METASTATIC DEPOSIT (MILLIMETERS): 6MM   EXTRANODAL EXTENSION:    PRESENT   EXTENT OF EXTRANODAL EXTENSION:   GREATER THAN 2 MM   TOTAL NUMBER OF LYMPH NODES EXAMINED:   1   NUMBER OF SENTINEL NODES EXAMINED: 1   PATHOLOGIC STAGE CLASSIFICATION (PTNM, AJCC 8TH EDITION)   PRIMARY TUMOR (PT): PT2   REGIONAL LYMPH NODES MODIFIER:     (SN): SENTINEL NODE(S) EVALUATED.   REGIONAL LYMPH NODES (PN):    PN1A   ADDITIONAL FINDINGS   ADDITIONAL FINDINGS:   BACKGROUND ABUNDANT ADIPOSE TISSUE WITH ATROPHIC DUCTAL AND LOBULAR   UNITS, AND FOCAL WEAKLY PROLIFERATIVE FIBROCYSTIC CHANGES, WITH DUCT   ECTASIA AND FOCAL CYST FORMATION, APOCRINE METAPLASIA, COLUMNAR CELL   CHANGES AND FOCAL USUAL DUCTAL HYPERPLASIA, NO ATYPIA   BENIGN SKIN, NEGATIVE FOR PAGET'S DISEASE   PREVIOUS BIOPSY SITE PRESENT, WITH CAVITY FORMATION AND ORGANIZING   CHANGES   BREAST BIOMARKER TESTING PERFORMED ON PREVIOUS BIOPSY: ESTROGEN RECEPTOR   (ER) STATUS:   POSITIVE (GREATER THAN 10% OF CELLS DEMONSTRATE NUCLEAR   POSITIVITY)   PERCENTAGE OF CELLS WITH NUCLEAR POSITIVITY: 71-80%   PROGESTERONE RECEPTOR (PGR) STATUS:     POSITIVE   PERCENTAGE OF CELLS WITH NUCLEAR POSITIVITY:   81-90%   HER2 (BY IMMUNOHISTOCHEMISTRY):   NEGATIVE (SCORE 1+)   TESTING PERFORMED ON CASE NUMBER:  PERFORMED IN CORE BIOPSY, [id number]     Imaging results        DX Hip/Pelvis/Spine    Result Date: 11/11/2022  EXAM: DX HIP/PELVIS/SPINE LOCATION: [facility] DATE/TIME: 11/11/2022 8:04 AM INDICATION: Malignant neoplasm of upper-outer quadrant of left breast in female, estrogen receptor positive (H), Malignant neoplasm of upper-outer quadrant of left breast in female, estrogen receptor positive (H), Long term (current) use of aromatase inhibitors. COMPARISON: 12/09/2016. TECHNIQUE: Dual-energy x-ray absorptiometry performed with routine technique. FINDINGS: Lumbar Spine: L1-L4 (L3): BMD: 1.074 g/cm2. T-score: -0.8. Z-score: 0.8 RIGHT Hip Total: BMD: 0.997  g/cm2. T-score: -0.1. Z-score: 1.2 RIGHT Hip Femoral neck: BMD: 1.052 g/cm2. T-score: 0.1. Z-score: 1.7 LEFT Hip Total: BMD: 1.096 g/cm2. T-score: 0.7. Z-score: 2.0 LEFT Hip Femoral neck: BMD: 1.098 g/cm2. T-score: 0.4. Z-score: 2.0 WHO Criteria: Normal: T score at or above -1 SD Osteopenia: T score between -1 and -2.5 SD Osteoporosis: T score at or below -2.5 SD COMPARISON: There has been a 5.8% decrease in lumbar spine BMD. There has been a 12.6% decrease in bilateral hip BMD. FRAX Results: Not applicable due to Normal bone mineral density.     IMPRESSION: NORMAL. Bone mineral density measurements are within normal limits using T score.      This note has been dictated using voice recognition software. Any grammatical or context distortions are unintentional and inherent to the software      Signed by: Quincy Rajput MD, MD        Again, thank you for allowing me to participate in the care of your patient.        Sincerely,        Quincy Rajput MD, MD

## 2022-11-15 ENCOUNTER — OFFICE VISIT (OUTPATIENT)
Dept: RADIATION ONCOLOGY | Facility: HOSPITAL | Age: 67
End: 2022-11-15
Attending: RADIOLOGY
Payer: COMMERCIAL

## 2022-11-15 VITALS
DIASTOLIC BLOOD PRESSURE: 86 MMHG | HEART RATE: 58 BPM | BODY MASS INDEX: 23.83 KG/M2 | SYSTOLIC BLOOD PRESSURE: 160 MMHG | OXYGEN SATURATION: 98 % | WEIGHT: 155.6 LBS

## 2022-11-15 DIAGNOSIS — Z17.0 MALIGNANT NEOPLASM OF UPPER-OUTER QUADRANT OF LEFT BREAST IN FEMALE, ESTROGEN RECEPTOR POSITIVE (H): Primary | ICD-10-CM

## 2022-11-15 DIAGNOSIS — C50.412 MALIGNANT NEOPLASM OF UPPER-OUTER QUADRANT OF LEFT BREAST IN FEMALE, ESTROGEN RECEPTOR POSITIVE (H): Primary | ICD-10-CM

## 2022-11-15 PROCEDURE — G0463 HOSPITAL OUTPT CLINIC VISIT: HCPCS

## 2022-11-15 ASSESSMENT — PAIN SCALES - GENERAL: PAINLEVEL: NO PAIN (0)

## 2022-11-15 NOTE — LETTER
11/15/2022         RE: Kristin Almanzar  [address] [place] [address]        Dear Colleague,    Thank you for referring your patient, Kristin Almanzar, to the [facility] [place]. Please see a copy of my visit note below.      [facility] Radiation Oncology Follow Up Note    Patient: Kristin Almanzar  MRN: [id number]  Date of Service: 11/15/2022    Assessment / Impression   68 yo with 24mm Grade I IDC ER + invasive ductal carcinoma of left breast s/p lumpectomy and adjuvant radiation completed 10/4/2022.  On anastrozole since mid October tolerating well.      Body site: Breast     Plan:   1) Continue ROM exercises for total of 6 months.  2) F/U with Dr Rajput as indicated for anastrozole.   3) F/U with us prn   4) F/U with Dr Allen with mammogram at one year from diagnosis.          Subjective:      HPI: Kristin Almanzar is a 67 year old woman with  left breast T2 N1 M0 ER/WI positive HER2/sloane negative invasive ductal carcinoma Oncotype 16. Status postlumpectomy adjuvant radiation 4240cGy completed 10/4/2022.   Started on anastrozole in the middle of October 2022.  .    Current Outpatient Medications   Medication Sig Dispense Refill     anastrozole (ARIMIDEX) 1 MG tablet Take 1 tablet (1 mg) by mouth daily 90 tablet 4     atenolol-chlorthalidone (TENORETIC) 50-25 MG tablet Take 1 tablet by mouth daily 90 tablet 2     Ibuprofen (ADVIL PO) Take 200 mg by mouth as needed for moderate pain (4-6)       KLOR-CON 20 MEQ CR tablet TAKE 1 TABLET DAILY 90 tablet 3     quinapril (ACCUPRIL) 40 MG tablet Take 1 tablet (40 mg) by mouth At Bedtime 90 tablet 4       The following portions of the patient's history were reviewed and updated as appropriate: allergies, current medications, past family history, past medical history, past social history, past surgical history and problem list.    Review of Systems    General  Constitutional  Constitutional (WDL): All constitutional elements  are within defined limits  EENT  Eye Disorders  Eye Disorder (WDL): All eye disorder elements are within defined limits (wears reading glasses)  Ear Disorders  Ear Disorder (WDL): Exceptions to WDL  Tinnitus: Absent or within normal limits (occasionally bilateral)  Respiratory    Respiratory  Respiratory (WDL): All respiratory elements are within defined limits  Cardiovascular  Cardiovascular  Cardiovascular (WDL): All cardiovascular elements are within defined limits  Gastrointestinal  Gastrointestinal  Gastrointestinal (WDL): All gastrointestinal elements are within defined limits  Musculoskeletal  Musculoskeletal and Connective Tissue Disorders  Musculoskeletal & Connective (WDL): Exceptions to WDL  Arthralgia: Absent or within normal limits  Integumentary              Integumentary  Integumentary (WDL): Exceptions to WDL (dark pigmentation around L niple)  Neurological  Neurosensory  Neurosensory (WDL): All neurosensory elements are within defined limits  Genitourinary/Reproductive  Genitourinary  Genitourinary (WDL): All genitourinary elements are within defined limits  Lymphatic   Lymph System Disorders  Lymph (WDL): All lymph elements are within defined limits    Pain   Pain Score: No Pain (0)   AUA Assessment                                                                         Accompanied by  Accompanied By: self only      Objective:     Physical Exam    Vitals:    11/15/22 1029 11/15/22 1032 11/15/22 1039   BP: (!) 174/80  (!) 160/86   Pulse: 58     SpO2: 98%     Weight: 70.6 kg (155 lb 9.6 oz)     PainSc: No Pain (0) No Pain (0)         Expected post radiation change left breast.  Actinic keratosis near nipple.     Recent Labs:   Recent Results (from the past 168 hour(s))   Comprehensive metabolic panel   Result Value Ref Range    Sodium 136 136 - 145 mmol/L    Potassium 3.9 3.4 - 5.3 mmol/L    Chloride 96 (L) 98 - 107 mmol/L    Carbon Dioxide (CO2) 30 (H) 22 - 29 mmol/L    Anion Gap 10 7 - 15 mmol/L     "Urea Nitrogen 12.2 8.0 - 23.0 mg/dL    Creatinine 0.61 0.51 - 0.95 mg/dL    Calcium 10.2 8.8 - 10.2 mg/dL    Glucose 104 (H) 70 - 99 mg/dL    Alkaline Phosphatase 55 35 - 104 U/L    AST 17 10 - 35 U/L    ALT 18 10 - 35 U/L    Protein Total 6.9 6.4 - 8.3 g/dL    Albumin 4.6 3.5 - 5.2 g/dL    Bilirubin Total 0.4 <=1.2 mg/dL    GFR Estimate >90 >60 mL/min/1.73m2           Signed by: Aviva Bess MD      Oncology Rooming Note    November 15, 2022 10:45 AM   Kristin Almanzar is a 67 year old female who presents for:    Chief Complaint   Patient presents with     Oncology Clinic Visit     Radiation Therapy     Breast Cancer     Radiation therapy after treatment follow up     Initial Vitals: BP (!) 160/86   Pulse 58   Wt 70.6 kg (155 lb 9.6 oz)   SpO2 98%   BMI 23.83 kg/m   Estimated body mass index is 23.83 kg/m  as calculated from the following:    Height as of 11/14/22: 1.721 m (5' 7.75\").    Weight as of this encounter: 70.6 kg (155 lb 9.6 oz). Body surface area is 1.84 meters squared.  No Pain (0) Comment: Data Unavailable   No LMP recorded. Patient is postmenopausal.  Allergies reviewed: Yes  Medications reviewed: Yes    Medications: Medication refills not needed today.  Pharmacy name entered into EPIC:    [facility] - [address] DR[facility] STORE #[id number] - [place] [address] [address] AT [place] [address] & [facility]  [facility] - [address]    Clinical concerns: Radiation therapy post treatment follow up, dark pigmentation skin around L nipple, elevated BP and pt is going to call her PCP.  Dr. Bess was notified.      HENRY CAMPOVERDE RN                Again, thank you for allowing me to participate in the care of your patient.        Sincerely,        Aviva Bess MD    "

## 2022-11-15 NOTE — PROGRESS NOTES
[facility] Radiation Oncology Follow Up Note    Patient: Kristin Almanzar  MRN: [id number]  Date of Service: 11/15/2022    Assessment / Impression   66 yo with 24mm Grade I IDC ER + invasive ductal carcinoma of left breast s/p lumpectomy and adjuvant radiation completed 10/4/2022.  On anastrozole since mid October tolerating well.      Body site: Breast     Plan:   1) Continue ROM exercises for total of 6 months.  2) F/U with Dr Rajput as indicated for anastrozole.   3) F/U with us prn   4) F/U with Dr Allen with mammogram at one year from diagnosis.          Subjective:      HPI: Kristin Almanzar is a 67 year old woman with  left breast T2 N1 M0 ER/OH positive HER2/sloane negative invasive ductal carcinoma Oncotype 16. Status postlumpectomy adjuvant radiation 4240cGy completed 10/4/2022.   Started on anastrozole in the middle of October 2022.  .    Current Outpatient Medications   Medication Sig Dispense Refill     anastrozole (ARIMIDEX) 1 MG tablet Take 1 tablet (1 mg) by mouth daily 90 tablet 4     atenolol-chlorthalidone (TENORETIC) 50-25 MG tablet Take 1 tablet by mouth daily 90 tablet 2     Ibuprofen (ADVIL PO) Take 200 mg by mouth as needed for moderate pain (4-6)       KLOR-CON 20 MEQ CR tablet TAKE 1 TABLET DAILY 90 tablet 3     quinapril (ACCUPRIL) 40 MG tablet Take 1 tablet (40 mg) by mouth At Bedtime 90 tablet 4       The following portions of the patient's history were reviewed and updated as appropriate: allergies, current medications, past family history, past medical history, past social history, past surgical history and problem list.    Review of Systems    General  Constitutional  Constitutional (WDL): All constitutional elements are within defined limits  EENT  Eye Disorders  Eye Disorder (WDL): All eye disorder elements are within defined limits (wears reading glasses)  Ear Disorders  Ear Disorder (WDL): Exceptions to WDL  Tinnitus: Absent or within normal limits (occasionally  bilateral)  Respiratory    Respiratory  Respiratory (WDL): All respiratory elements are within defined limits  Cardiovascular  Cardiovascular  Cardiovascular (WDL): All cardiovascular elements are within defined limits  Gastrointestinal  Gastrointestinal  Gastrointestinal (WDL): All gastrointestinal elements are within defined limits  Musculoskeletal  Musculoskeletal and Connective Tissue Disorders  Musculoskeletal & Connective (WDL): Exceptions to WDL  Arthralgia: Absent or within normal limits  Integumentary              Integumentary  Integumentary (WDL): Exceptions to WDL (dark pigmentation around L niple)  Neurological  Neurosensory  Neurosensory (WDL): All neurosensory elements are within defined limits  Genitourinary/Reproductive  Genitourinary  Genitourinary (WDL): All genitourinary elements are within defined limits  Lymphatic   Lymph System Disorders  Lymph (WDL): All lymph elements are within defined limits    Pain   Pain Score: No Pain (0)   AUA Assessment                                                                         Accompanied by  Accompanied By: self only      Objective:     Physical Exam    Vitals:    11/15/22 1029 11/15/22 1032 11/15/22 1039   BP: (!) 174/80  (!) 160/86   Pulse: 58     SpO2: 98%     Weight: 70.6 kg (155 lb 9.6 oz)     PainSc: No Pain (0) No Pain (0)         Expected post radiation change left breast.  Actinic keratosis near nipple.     Recent Labs:   Recent Results (from the past 168 hour(s))   Comprehensive metabolic panel   Result Value Ref Range    Sodium 136 136 - 145 mmol/L    Potassium 3.9 3.4 - 5.3 mmol/L    Chloride 96 (L) 98 - 107 mmol/L    Carbon Dioxide (CO2) 30 (H) 22 - 29 mmol/L    Anion Gap 10 7 - 15 mmol/L    Urea Nitrogen 12.2 8.0 - 23.0 mg/dL    Creatinine 0.61 0.51 - 0.95 mg/dL    Calcium 10.2 8.8 - 10.2 mg/dL    Glucose 104 (H) 70 - 99 mg/dL    Alkaline Phosphatase 55 35 - 104 U/L    AST 17 10 - 35 U/L    ALT 18 10 - 35 U/L    Protein Total 6.9 6.4 - 8.3  g/dL    Albumin 4.6 3.5 - 5.2 g/dL    Bilirubin Total 0.4 <=1.2 mg/dL    GFR Estimate >90 >60 mL/min/1.73m2           Signed by: Aviva Bess MD

## 2022-11-15 NOTE — PROGRESS NOTES
"Oncology Rooming Note    November 15, 2022 10:45 AM   Kristin Almanzar is a 67 year old female who presents for:    Chief Complaint   Patient presents with     Oncology Clinic Visit     Radiation Therapy     Breast Cancer     Radiation therapy after treatment follow up     Initial Vitals: BP (!) 160/86   Pulse 58   Wt 70.6 kg (155 lb 9.6 oz)   SpO2 98%   BMI 23.83 kg/m   Estimated body mass index is 23.83 kg/m  as calculated from the following:    Height as of 11/14/22: 1.721 m (5' 7.75\").    Weight as of this encounter: 70.6 kg (155 lb 9.6 oz). Body surface area is 1.84 meters squared.  No Pain (0) Comment: Data Unavailable   No LMP recorded. Patient is postmenopausal.  Allergies reviewed: Yes  Medications reviewed: Yes    Medications: Medication refills not needed today.  Pharmacy name entered into EPIC:    [facility] - [address] [facility] #[id number] - [place] [address] [address] AT [place] [address] & [facility]  [facility] - [address]    Clinical concerns: Radiation therapy post treatment follow up, dark pigmentation skin around L nipple, elevated BP and pt is going to call her PCP.  Dr. Bess was notified.      HENRY CAMPOVERDE RN            "

## 2022-12-12 ENCOUNTER — VIRTUAL VISIT (OUTPATIENT)
Dept: INTERNAL MEDICINE | Facility: CLINIC | Age: 67
End: 2022-12-12
Payer: COMMERCIAL

## 2022-12-12 DIAGNOSIS — I10 ESSENTIAL HYPERTENSION: Primary | ICD-10-CM

## 2022-12-12 PROCEDURE — 99441 PR PHYSICIAN TELEPHONE EVALUATION 5-10 MIN: CPT | Mod: 95 | Performed by: INTERNAL MEDICINE

## 2022-12-12 NOTE — PROGRESS NOTES
Kristin Almanzar is a 67 year oldwho is being evaluated via a billable telephone visit.       What phone number would you like to be contacted at? [phone]      Assessment & Plan  Hypertension outside blood pressure readings reviewed 127/76.  Continue same meds no target organ damage related to same.  Discouraged use of ibuprofen because of decreased renal blood flow associated with same and hypertension exacerbation acetaminophen Tylenol safer.    Breast cancer by history status postlumpectomy and postoperative radiation therapy Dr. Oconnor (supervising.  Thankfully no sign of recurrence continue Arimidex same.     11 minutes spent on the date of the encounter doing chart review, patient visit and documentation  and I spoke with the patient directly on the phone 5 minutes.  We had a good discussion.       Subjective   Outside blood pressure readings reviewed.  No target organ damage related to hypertension.  Office hypertension or whitecoat hypertension discussed.     Review of Systems   No blood in stool or urine med list reviewed reconciled in the chart.       Kaiden Pruitt MD  Answers for HPI/ROS submitted by the patient on 12/8/2022  Do you check your blood pressure regularly outside of the clinic?: Yes  Are your blood pressures ever more than 140 on the top number (systolic) OR more than 90 on the bottom number (diastolic)? (For example, greater than 140/90): Yes  Are you following a low salt diet?: Yes

## 2022-12-12 NOTE — PROGRESS NOTES
"Kristin is a 67 year old who is being evaluated via a billable telephone visit.      What phone number would you like to be contacted at? [phone]  How would you like to obtain your AVS? Larry    {PROVIDER CHARTING PREFERENCE:676268}    Subjective   Kristin is a 67 year old, presenting for the following health issues:  Hypertension      History of Present Illness       Hypertension: She presents for follow up of hypertension.  She does check blood pressure  regularly outside of the clinic. Outside blood pressures have been over 140/90. She follows a low salt diet.         Hypertension Follow-up      Do you check your blood pressure regularly outside of the clinic? Yes     Are you following a low salt diet? Yes    Are your blood pressures ever more than 140 on the top number (systolic) OR more   than 90 on the bottom number (diastolic), for example 140/90? Yes      How many servings of fruits and vegetables do you eat daily?  2-3    On average, how many sweetened beverages do you drink each day (Examples: soda, juice, sweet tea, etc.  Do NOT count diet or artificially sweetened beverages)?   0    How many days per week do you exercise enough to make your heart beat faster? 3 or less    How many minutes a day do you exercise enough to make your heart beat faster? 30 - 60    How many days per week do you miss taking your medication? 0    {additonal problems for provider to add (Optional):680359}    Review of Systems   {ROS COMP (Optional):966838}      Objective           Vitals:  No vitals were obtained today due to virtual visit.    Physical Exam   {GENERAL APPEARANCE:50::\"healthy\",\"alert\",\"no distress\"}  PSYCH: Alert and oriented times 3; coherent speech, normal   rate and volume, able to articulate logical thoughts, able   to abstract reason, no tangential thoughts, no hallucinations   or delusions  Her affect is { :6603955::\"normal\"}  RESP: No cough, no audible wheezing, able to talk in full sentences  Remainder " of exam unable to be completed due to telephone visits    {Diagnostic Test Results (Optional):729694}    {AMBULATORY ATTESTATION (Optional):498823}        Phone call duration: *** minutes

## 2023-02-13 ENCOUNTER — ONCOLOGY VISIT (OUTPATIENT)
Dept: ONCOLOGY | Facility: HOSPITAL | Age: 68
End: 2023-02-13
Payer: COMMERCIAL

## 2023-02-13 ENCOUNTER — LAB (OUTPATIENT)
Dept: INFUSION THERAPY | Facility: HOSPITAL | Age: 68
End: 2023-02-13
Payer: COMMERCIAL

## 2023-02-13 VITALS
WEIGHT: 158.1 LBS | SYSTOLIC BLOOD PRESSURE: 185 MMHG | DIASTOLIC BLOOD PRESSURE: 82 MMHG | HEIGHT: 68 IN | OXYGEN SATURATION: 100 % | TEMPERATURE: 97.5 F | RESPIRATION RATE: 14 BRPM | BODY MASS INDEX: 23.96 KG/M2 | HEART RATE: 57 BPM

## 2023-02-13 DIAGNOSIS — I10 ESSENTIAL HYPERTENSION: ICD-10-CM

## 2023-02-13 DIAGNOSIS — C50.412 MALIGNANT NEOPLASM OF UPPER-OUTER QUADRANT OF LEFT BREAST IN FEMALE, ESTROGEN RECEPTOR POSITIVE (H): ICD-10-CM

## 2023-02-13 DIAGNOSIS — Z17.0 MALIGNANT NEOPLASM OF UPPER-OUTER QUADRANT OF LEFT BREAST IN FEMALE, ESTROGEN RECEPTOR POSITIVE (H): ICD-10-CM

## 2023-02-13 DIAGNOSIS — Z17.0 MALIGNANT NEOPLASM OF UPPER-OUTER QUADRANT OF LEFT BREAST IN FEMALE, ESTROGEN RECEPTOR POSITIVE (H): Primary | ICD-10-CM

## 2023-02-13 DIAGNOSIS — Z12.31 ENCOUNTER FOR SCREENING MAMMOGRAM FOR MALIGNANT NEOPLASM OF BREAST: ICD-10-CM

## 2023-02-13 DIAGNOSIS — C50.412 MALIGNANT NEOPLASM OF UPPER-OUTER QUADRANT OF LEFT BREAST IN FEMALE, ESTROGEN RECEPTOR POSITIVE (H): Primary | ICD-10-CM

## 2023-02-13 DIAGNOSIS — Z79.811 LONG TERM (CURRENT) USE OF AROMATASE INHIBITORS: ICD-10-CM

## 2023-02-13 LAB
ALBUMIN SERPL BCG-MCNC: 4.4 G/DL (ref 3.5–5.2)
ALP SERPL-CCNC: 49 U/L (ref 35–104)
ALT SERPL W P-5'-P-CCNC: 16 U/L (ref 10–35)
ANION GAP SERPL CALCULATED.3IONS-SCNC: 10 MMOL/L (ref 7–15)
AST SERPL W P-5'-P-CCNC: 20 U/L (ref 10–35)
BILIRUB SERPL-MCNC: 0.4 MG/DL
BUN SERPL-MCNC: 16.8 MG/DL (ref 8–23)
CALCIUM SERPL-MCNC: 10.4 MG/DL (ref 8.8–10.2)
CHLORIDE SERPL-SCNC: 99 MMOL/L (ref 98–107)
CREAT SERPL-MCNC: 0.63 MG/DL (ref 0.51–0.95)
DEPRECATED HCO3 PLAS-SCNC: 31 MMOL/L (ref 22–29)
GFR SERPL CREATININE-BSD FRML MDRD: >90 ML/MIN/1.73M2
GLUCOSE SERPL-MCNC: 98 MG/DL (ref 70–99)
POTASSIUM SERPL-SCNC: 3.8 MMOL/L (ref 3.4–5.3)
PROT SERPL-MCNC: 7 G/DL (ref 6.4–8.3)
SODIUM SERPL-SCNC: 140 MMOL/L (ref 136–145)

## 2023-02-13 PROCEDURE — 36415 COLL VENOUS BLD VENIPUNCTURE: CPT

## 2023-02-13 PROCEDURE — G0463 HOSPITAL OUTPT CLINIC VISIT: HCPCS | Performed by: NURSE PRACTITIONER

## 2023-02-13 PROCEDURE — 80053 COMPREHEN METABOLIC PANEL: CPT

## 2023-02-13 PROCEDURE — 99213 OFFICE O/P EST LOW 20 MIN: CPT | Performed by: NURSE PRACTITIONER

## 2023-02-13 RX ORDER — ACETAMINOPHEN 500 MG
500-1000 TABLET ORAL PRN
COMMUNITY

## 2023-02-13 RX ORDER — QUINAPRIL 40 MG/1
40 TABLET ORAL AT BEDTIME
Qty: 90 TABLET | Refills: 4 | Status: SHIPPED | OUTPATIENT
Start: 2023-02-13 | End: 2023-06-19

## 2023-02-13 ASSESSMENT — PAIN SCALES - GENERAL: PAINLEVEL: NO PAIN (0)

## 2023-02-13 NOTE — PROGRESS NOTES
[facility] Hematology and Oncology Progress Note    Patient: Kristin Almanzar  MRN: [id number]  Date of Service: Feb 13, 2023         Reason for Visit    Chief Complaint   Patient presents with     Oncology Clinic Visit     3 month follow up with labs related to Malignant neoplasm of upper-outer quadrant of left breast in female, estrogen receptor positive        Assessment and Plan    1. Breast Cancer, Stage I:  Continue anastrazole 1mg p.o. daily. The patient will be on this medication for 5 years which will be until October 2027. Mammo due in June. No evidence of recurrence today. Will return in 3-4 months.     2. Vaginal dryness, likely from AI  Reports vaginal dryness, but has found relief with natural methods such as coconut oil. Pain is primarily with intercourse, but has found that it is manageable with coconut oil.     3. Risk of osteoporosis:   Patient is up to date with her DEXA, next due in 2024. Next mammogram will be in June of this year and then patient will see Regulo.     4. Hypertension  Has been waiting on refill from Dr. Obrien. Has had issues due to med being backordered at her current pharmacy. Will  refill today.        Cancer Staging   No matching staging information was found for the patient.    ECOG Performance    0 - Independent     Pain  Pain Score: No Pain (0)    History of Present Illness  Ms. Kristin Almanzar is a 67 year old who has been referred to us for evaluation of left-sided breast cancer diagnosed in July 2022 when she presented for mammogram in June 2022.  The mammogram was abnormal.  She had some additional studies and then a biopsy.  The biopsy confirmed invasive ductal carcinoma.  She underwent surgery on 27th July 2022.  She had lumpectomy with sentinel lymph node biopsy done.  She had 24 mm invasive ductal carcinoma removed.  [place] lymph nodes was positive 1 out of 2 with a 6 mm deposit.  No extracapsular extension.  Margins were negative.     She has  been referred here for consideration of further therapy.  She also had an Oncotype score done.  The Oncotype score is 16.     Besides hypertension she has been very healthy person.  Dr. Obrien is her primary care doctor.     Finished radiation in October 2022.  Started on anastrozole after that.  So far seems to be tolerating it okay.  Did do a bone density before starting and it was normal.     Feb 2023: Patient reports today feeling well. She has been tolerating the anastrozole relatively well, except for a few hot flashes at night. She finds this to be infrequent and tolerable. She had intermittent pain in her right mid back in December, but it has since resolved. She does have some lingering intermittent nerve pain in her left breast at the site of radiation. She reports it is transient and not constant. She has had some vaginal dryness with intercourse, but has found that coconut oil has been helpful for managing these symptoms. Overall, she has been doing well. She is due for a follow up visit in June with Dr. Rajput and will also have her mammogram completed.       Review of systems.    Pertinent information is listed in the HPI.     Past History    Past Medical History:   Diagnosis Date     History of anesthesia complications     vinethane - slow to wake up     Hypertension      PONV (postoperative nausea and vomiting)     as a child had vinethane slow to awake       Past Surgical History:   Procedure Laterality Date     APPENDECTOMY       COLON SURGERY Right     Colectomy - Large polyp     COLPORRHAPHY N/A 6/27/2017    Procedure:  UTEROSACRAL LIGAMENT SUSPENSION CYSTOSCOPY;  Surgeon: Nancy Girard MD;  Location: [facility];  Service:      EYE SURGERY Left     Cataract     HYSTERECTOMY  06/27/2017     HYSTERECTOMY VAGINAL N/A 6/27/2017    Procedure: VAGINAL HYSTERECTOMY;  Surgeon: Nancy Girard MD;  Location: [facility];  Service:      LUMPECTOMY BREAST Left 7/27/2022    Procedure:  "Left Lumpectpmy; [place] Lymph Node Biopsy;  Surgeon: Anastasia Allen MD;  Location: [facility]         Physical Exam    BP (!) 185/82 (BP Location: Right arm, Patient Position: Sitting, Cuff Size: Adult Regular)   Pulse 57   Temp 97.5  F (36.4  C) (Tympanic)   Resp 14   Ht 1.721 m (5' 7.75\")   Wt 71.7 kg (158 lb 1.6 oz)   SpO2 100%   BMI 24.22 kg/m      GENERAL: no acute distress. Cooperative in conversation. Here alone. Mask on  RESP: Regular respiratory rate. No expiratory wheezes   MUSCULOSKELETAL: no bilateral leg swelling  NEURO: non focal. Alert and oriented x3.   PSYCH: within normal limits. No depression or anxiety.  SKIN: exposed skin is dry intact.   BREAST: Bilateral exam done.  Lumpectomy incision is well-healed.  Some radiation skin changes with discoloration and scarring.  No abnormal lesions or rashes noted.  No evidence for local recurrence bilaterally.      Lab Results    Recent Results (from the past 168 hour(s))   Comprehensive metabolic panel   Result Value Ref Range    Sodium 140 136 - 145 mmol/L    Potassium 3.8 3.4 - 5.3 mmol/L    Chloride 99 98 - 107 mmol/L    Carbon Dioxide (CO2) 31 (H) 22 - 29 mmol/L    Anion Gap 10 7 - 15 mmol/L    Urea Nitrogen 16.8 8.0 - 23.0 mg/dL    Creatinine 0.63 0.51 - 0.95 mg/dL    Calcium 10.4 (H) 8.8 - 10.2 mg/dL    Glucose 98 70 - 99 mg/dL    Alkaline Phosphatase 49 35 - 104 U/L    AST 20 10 - 35 U/L    ALT 16 10 - 35 U/L    Protein Total 7.0 6.4 - 8.3 g/dL    Albumin 4.4 3.5 - 5.2 g/dL    Bilirubin Total 0.4 <=1.2 mg/dL    GFR Estimate >90 >60 mL/min/1.73m2       Imaging    No results found.  Seen and examined pt with NP student. The medical care has been evaluated and discussed with the student, Daysi. The documentation has been reviewed and I agree with the medical plan.       Signed by: KENDRICK Malone CNP     "

## 2023-02-13 NOTE — TELEPHONE ENCOUNTER
Patient calling to get a medication refill on medications attached.      Patient has about 13 days left     [facility] and [facility]    [facility] will [facility] after this prescription.    Call Back  [phone]

## 2023-02-13 NOTE — LETTER
"    2/13/2023         RE: Kristin Almanzar  [address] [place] [address]        Dear Colleague,    Thank you for referring your patient, Kristin Almanzar, to the [facility] [place]. Please see a copy of my visit note below.    Oncology Rooming Note    February 13, 2023 1:42 PM   Kristin Almanzar is a 67 year old female who presents for:    Chief Complaint   Patient presents with     Oncology Clinic Visit     3 month follow up with labs related to Malignant neoplasm of upper-outer quadrant of left breast in female, estrogen receptor positive      Initial Vitals: BP (!) 185/82 (BP Location: Right arm, Patient Position: Sitting, Cuff Size: Adult Regular)   Pulse 57   Temp 97.5  F (36.4  C) (Tympanic)   Resp 14   Ht 1.721 m (5' 7.75\")   Wt 71.7 kg (158 lb 1.6 oz)   SpO2 100%   BMI 24.22 kg/m   Estimated body mass index is 24.22 kg/m  as calculated from the following:    Height as of this encounter: 1.721 m (5' 7.75\").    Weight as of this encounter: 71.7 kg (158 lb 1.6 oz). Body surface area is 1.85 meters squared.  No Pain (0) Comment: Data Unavailable   No LMP recorded. Patient is postmenopausal.  Allergies reviewed: Yes  Medications reviewed: Yes    Medications: Medication refills not needed today.  Pharmacy name entered into EPIC:    [facility]  [facility] - [address] DRUG STORE #[id number] - [place] [address] [address] AT [place] [address] & [facility]  [facility] - [address]    Clinical concerns: 3 month follow up with labs related to Malignant neoplasm of upper-outer quadrant of left breast in female, estrogen receptor positive     MEENA PAREDES CMA              [facility] Hematology and Oncology Progress Note    Patient: Kristin Almanzar  MRN: [id number]  Date of Service: Feb 13, 2023         Reason for Visit    Chief Complaint   Patient presents with     Oncology " Clinic Visit     3 month follow up with labs related to Malignant neoplasm of upper-outer quadrant of left breast in female, estrogen receptor positive        Assessment and Plan    1. Breast Cancer, Stage I:  Continue anastrazole 1mg p.o. daily. The patient will be on this medication for 5 years which will be until October 2027. Mammo due in June. No evidence of recurrence today. Will return in 3-4 months.     2. Vaginal dryness, likely from AI  Reports vaginal dryness, but has found relief with natural methods such as coconut oil. Pain is primarily with intercourse, but has found that it is manageable with coconut oil.     3. Risk of osteoporosis:   Patient is up to date with her DEXA, next due in 2024. Next mammogram will be in June of this year and then patient will see Regulo.     4. Hypertension  Has been waiting on refill from Dr. Obrien. Has had issues due to med being backordered at her current pharmacy. Will  refill today.        Cancer Staging   No matching staging information was found for the patient.    ECOG Performance    0 - Independent     Pain  Pain Score: No Pain (0)    History of Present Illness  Ms. Kristin Almanzar is a 67 year old who has been referred to us for evaluation of left-sided breast cancer diagnosed in July 2022 when she presented for mammogram in June 2022.  The mammogram was abnormal.  She had some additional studies and then a biopsy.  The biopsy confirmed invasive ductal carcinoma.  She underwent surgery on 27th July 2022.  She had lumpectomy with sentinel lymph node biopsy done.  She had 24 mm invasive ductal carcinoma removed.  [place] lymph nodes was positive 1 out of 2 with a 6 mm deposit.  No extracapsular extension.  Margins were negative.     She has been referred here for consideration of further therapy.  She also had an Oncotype score done.  The Oncotype score is 16.     Besides hypertension she has been very healthy person.  Dr. Obrien is her primary care  doctor.     Finished radiation in October 2022.  Started on anastrozole after that.  So far seems to be tolerating it okay.  Did do a bone density before starting and it was normal.     Feb 2023: Patient reports today feeling well. She has been tolerating the anastrozole relatively well, except for a few hot flashes at night. She finds this to be infrequent and tolerable. She had intermittent pain in her right mid back in December, but it has since resolved. She does have some lingering intermittent nerve pain in her left breast at the site of radiation. She reports it is transient and not constant. She has had some vaginal dryness with intercourse, but has found that coconut oil has been helpful for managing these symptoms. Overall, she has been doing well. She is due for a follow up visit in June with Dr. Rajput and will also have her mammogram completed.       Review of systems.    Pertinent information is listed in the HPI.     Past History    Past Medical History:   Diagnosis Date     History of anesthesia complications     vinethane - slow to wake up     Hypertension      PONV (postoperative nausea and vomiting)     as a child had vinethane slow to awake       Past Surgical History:   Procedure Laterality Date     APPENDECTOMY       COLON SURGERY Right     Colectomy - Large polyp     COLPORRHAPHY N/A 6/27/2017    Procedure:  UTEROSACRAL LIGAMENT SUSPENSION CYSTOSCOPY;  Surgeon: Nancy Girard MD;  Location: [facility];  Service:      EYE SURGERY Left     Cataract     HYSTERECTOMY  06/27/2017     HYSTERECTOMY VAGINAL N/A 6/27/2017    Procedure: VAGINAL HYSTERECTOMY;  Surgeon: Nancy Girard MD;  Location: [facility];  Service:      LUMPECTOMY BREAST Left 7/27/2022    Procedure: Left Lumpectpmy; [place] Lymph Node Biopsy;  Surgeon: Anastasia Allen MD;  Location: [facility]         Physical Exam    BP (!) 185/82 (BP Location: Right arm, Patient Position: Sitting, Cuff Size: Adult  "Regular)   Pulse 57   Temp 97.5  F (36.4  C) (Tympanic)   Resp 14   Ht 1.721 m (5' 7.75\")   Wt 71.7 kg (158 lb 1.6 oz)   SpO2 100%   BMI 24.22 kg/m      GENERAL: no acute distress. Cooperative in conversation. Here alone. Mask on  RESP: Regular respiratory rate. No expiratory wheezes   MUSCULOSKELETAL: no bilateral leg swelling  NEURO: non focal. Alert and oriented x3.   PSYCH: within normal limits. No depression or anxiety.  SKIN: exposed skin is dry intact.   BREAST: Bilateral exam done.  Lumpectomy incision is well-healed.  Some radiation skin changes with discoloration and scarring.  No abnormal lesions or rashes noted.  No evidence for local recurrence bilaterally.      Lab Results    Recent Results (from the past 168 hour(s))   Comprehensive metabolic panel   Result Value Ref Range    Sodium 140 136 - 145 mmol/L    Potassium 3.8 3.4 - 5.3 mmol/L    Chloride 99 98 - 107 mmol/L    Carbon Dioxide (CO2) 31 (H) 22 - 29 mmol/L    Anion Gap 10 7 - 15 mmol/L    Urea Nitrogen 16.8 8.0 - 23.0 mg/dL    Creatinine 0.63 0.51 - 0.95 mg/dL    Calcium 10.4 (H) 8.8 - 10.2 mg/dL    Glucose 98 70 - 99 mg/dL    Alkaline Phosphatase 49 35 - 104 U/L    AST 20 10 - 35 U/L    ALT 16 10 - 35 U/L    Protein Total 7.0 6.4 - 8.3 g/dL    Albumin 4.4 3.5 - 5.2 g/dL    Bilirubin Total 0.4 <=1.2 mg/dL    GFR Estimate >90 >60 mL/min/1.73m2       Imaging    No results found.  Seen and examined pt with NP student. The medical care has been evaluated and discussed with the student, Daysi. The documentation has been reviewed and I agree with the medical plan.       Signed by: KENDRICK Malone CNP         Again, thank you for allowing me to participate in the care of your patient.        Sincerely,        KENDRICK Malone CNP    "

## 2023-02-13 NOTE — PROGRESS NOTES
"Oncology Rooming Note    February 13, 2023 1:42 PM   Kristin Almanzar is a 67 year old female who presents for:    Chief Complaint   Patient presents with     Oncology Clinic Visit     3 month follow up with labs related to Malignant neoplasm of upper-outer quadrant of left breast in female, estrogen receptor positive      Initial Vitals: BP (!) 185/82 (BP Location: Right arm, Patient Position: Sitting, Cuff Size: Adult Regular)   Pulse 57   Temp 97.5  F (36.4  C) (Tympanic)   Resp 14   Ht 1.721 m (5' 7.75\")   Wt 71.7 kg (158 lb 1.6 oz)   SpO2 100%   BMI 24.22 kg/m   Estimated body mass index is 24.22 kg/m  as calculated from the following:    Height as of this encounter: 1.721 m (5' 7.75\").    Weight as of this encounter: 71.7 kg (158 lb 1.6 oz). Body surface area is 1.85 meters squared.  No Pain (0) Comment: Data Unavailable   No LMP recorded. Patient is postmenopausal.  Allergies reviewed: Yes  Medications reviewed: Yes    Medications: Medication refills not needed today.  Pharmacy name entered into EPIC:    [facility] - [address] [id number] - [place], [place] - [address] [place] [address] [address] [place] [address] & [facility]  [facility] - [place], [place] - [address] [place] [address]    Clinical concerns: 3 month follow up with labs related to Malignant neoplasm of upper-outer quadrant of left breast in female, estrogen receptor positive     MEENA PAREDES CMA            "

## 2023-02-24 NOTE — TELEPHONE ENCOUNTER
Incoming fax from pharmacy regarding this medication. Medication is on long-term backorder. Is there another medication patient can use?       Darren Espino Jr., CMA on 2/24/2023 at 1:25 PM

## 2023-04-04 ENCOUNTER — MYC MEDICAL ADVICE (OUTPATIENT)
Dept: INTERNAL MEDICINE | Facility: CLINIC | Age: 68
End: 2023-04-04
Payer: COMMERCIAL

## 2023-04-04 DIAGNOSIS — I10 ESSENTIAL HYPERTENSION: Primary | ICD-10-CM

## 2023-04-05 RX ORDER — LISINOPRIL 10 MG/1
10 TABLET ORAL DAILY
Qty: 90 TABLET | Refills: 11 | Status: SHIPPED | OUTPATIENT
Start: 2023-04-05 | End: 2024-03-11

## 2023-05-22 ENCOUNTER — PATIENT OUTREACH (OUTPATIENT)
Dept: CARE COORDINATION | Facility: CLINIC | Age: 68
End: 2023-05-22
Payer: COMMERCIAL

## 2023-06-06 ENCOUNTER — PATIENT OUTREACH (OUTPATIENT)
Dept: CARE COORDINATION | Facility: CLINIC | Age: 68
End: 2023-06-06
Payer: COMMERCIAL

## 2023-06-19 ENCOUNTER — ONCOLOGY VISIT (OUTPATIENT)
Dept: ONCOLOGY | Facility: HOSPITAL | Age: 68
End: 2023-06-19
Payer: COMMERCIAL

## 2023-06-19 VITALS
WEIGHT: 157 LBS | RESPIRATION RATE: 16 BRPM | OXYGEN SATURATION: 97 % | SYSTOLIC BLOOD PRESSURE: 164 MMHG | BODY MASS INDEX: 24.05 KG/M2 | TEMPERATURE: 98.5 F | HEART RATE: 58 BPM | DIASTOLIC BLOOD PRESSURE: 78 MMHG

## 2023-06-19 DIAGNOSIS — I10 ESSENTIAL HYPERTENSION: ICD-10-CM

## 2023-06-19 DIAGNOSIS — Z79.811 LONG TERM (CURRENT) USE OF AROMATASE INHIBITORS: ICD-10-CM

## 2023-06-19 DIAGNOSIS — I10 HTN (HYPERTENSION): ICD-10-CM

## 2023-06-19 DIAGNOSIS — E04.9 NON-TOXIC NODULAR GOITER: ICD-10-CM

## 2023-06-19 DIAGNOSIS — Z17.0 MALIGNANT NEOPLASM OF UPPER-OUTER QUADRANT OF LEFT BREAST IN FEMALE, ESTROGEN RECEPTOR POSITIVE (H): Primary | ICD-10-CM

## 2023-06-19 DIAGNOSIS — C50.412 MALIGNANT NEOPLASM OF UPPER-OUTER QUADRANT OF LEFT BREAST IN FEMALE, ESTROGEN RECEPTOR POSITIVE (H): Primary | ICD-10-CM

## 2023-06-19 PROCEDURE — 99215 OFFICE O/P EST HI 40 MIN: CPT | Performed by: INTERNAL MEDICINE

## 2023-06-19 PROCEDURE — G0463 HOSPITAL OUTPT CLINIC VISIT: HCPCS | Performed by: INTERNAL MEDICINE

## 2023-06-19 RX ORDER — ATENOLOL AND CHLORTHALIDONE TABLET 50; 25 MG/1; MG/1
TABLET ORAL
Qty: 90 TABLET | Refills: 3 | OUTPATIENT
Start: 2023-06-19

## 2023-06-19 RX ORDER — POTASSIUM CHLORIDE 1500 MG/1
TABLET, EXTENDED RELEASE ORAL
Qty: 90 TABLET | Refills: 3 | OUTPATIENT
Start: 2023-06-19

## 2023-06-19 ASSESSMENT — PAIN SCALES - GENERAL: PAINLEVEL: NO PAIN (0)

## 2023-06-19 NOTE — PROGRESS NOTES
[facility] Hematology and Oncology Consult Note    Patient: Kristin Almanzar  MRN: [id number]  Date of Service: Jun 19, 2023           Reason for consultation      Problem List Items Addressed This Visit        Other    Malignant neoplasm of upper-outer quadrant of left breast in female, estrogen receptor positive (H) - Primary   Other Visit Diagnoses     Long term (current) use of aromatase inhibitors                 Assessment / number of problems addressed      1.  A very pleasant 67 year old woman with invasive ductal carcinoma left breast T2 N1 M0 ER/TN positive HER2/sloane negative status postlumpectomy.  Started on anastrozole in the middle of October 2022.  So far seems to be tolerating it well.  2.  Oncotype score of 16.  Normal bone density at the start.  3.  Good general health otherwise.  4.  Some concern regarding endocrine therapy.  5.  Some degree of anxiety.  6.  Slight vaginal dryness secondary to anastrozole.  7.  Enlarged thyroid gland.  She has not seen the endocrinologist in a while.    Plan and medical decision making      1.  Anastrozole 1 mg p.o. daily.  She will take it until October 2027.  2.  Yearly mammogram.  Next mammogram is due this month.  3.  Diet rich in calcium and vitamin D.  4.  Vaginal hydrating and rejuvenating agents as well as lubricating agents.  5.  Follow-up with regular doctor regarding blood pressure medications.  Continue to range of movement exercises for left shoulder.  6.  We also talked about getting an ultrasound of her thyroid.  If the ultrasound of the thyroid is unchanged and she does really did not need to do anything different for her.  If it does show some worrisome changes then she should contact Dr. Fu at [facility].    Clinical/pathological stage       Cancer Staging   No matching staging information was found for the patient.    History of present illness      Ms. Kristin Almanzar is a 67 year old who has been referred to us for  evaluation of left-sided breast cancer diagnosed in July 2022 when she presented for mammogram in June 2022.  The mammogram was abnormal.  She had some additional studies and then a biopsy.  The biopsy confirmed invasive ductal carcinoma.  She underwent surgery on 27th July 2022.  She had lumpectomy with sentinel lymph node biopsy done.  She had 24 mm invasive ductal carcinoma removed.  [place] lymph nodes was positive 1 out of 2 with a 6 mm deposit.  No extracapsular extension.  Margins were negative.    She has been referred here for consideration of further therapy.  She also had an Oncotype score done.  The Oncotype score is 16.    Besides hypertension she has been very healthy person.  Dr. Obrien is her primary care doctor.    Finished radiation in October 2022.  Started on anastrozole after that.  So far seems to be tolerating it okay.  Did do a bone density before starting and it was normal.    Comes in today for scheduled follow-up.  Overall seems to be doing okay.  No new medical issues.    Detailed review of systems      A 14 point review of systems was obtained.  Positive findings noted in the history.  Rest of the review of system is otherwise negative.       Past medical/surgical/social/family history        Past Medical History:   Diagnosis Date     History of anesthesia complications     vinethane - slow to wake up     Hypertension      PONV (postoperative nausea and vomiting)     as a child had vinethane slow to awake     Past Surgical History:   Procedure Laterality Date     APPENDECTOMY       COLON SURGERY Right     Colectomy - Large polyp     COLPORRHAPHY N/A 6/27/2017    Procedure:  UTEROSACRAL LIGAMENT SUSPENSION CYSTOSCOPY;  Surgeon: Nancy Girard MD;  Location: [facility] OR;  Service:      EYE SURGERY Left     Cataract     HYSTERECTOMY  06/27/2017     HYSTERECTOMY VAGINAL N/A 6/27/2017    Procedure: VAGINAL HYSTERECTOMY;  Surgeon: Nancy Girard MD;  Location: [facility]  OR;  Service:      LUMPECTOMY BREAST Left 7/27/2022    Procedure: Left Lumpectpmy; [place] Lymph Node Biopsy;  Surgeon: Anastasia Allen MD;  Location: [facility] OR     Family History   Problem Relation Age of Onset     Colon Cancer Father 78.00     Breast Cancer Sister 41.00     Cancer Brother 55.00        Prostate       Allergies      Allergies   Allergen Reactions     Amoxicillin Rash     Propoxyphene Rash     Red blotches on face       Physical exam        BP (!) 164/78 (BP Location: Right arm, Patient Position: Sitting)   Pulse 58   Temp 98.5  F (36.9  C) (Oral)   Resp 16   Wt 71.2 kg (157 lb)   SpO2 97%   BMI 24.05 kg/m      GENERAL: No acute distress. Cooperative in conversation.   HEENT:  Pupils are equal, round and reactive. Oral mucosa is clean and intact. No ulcerations or mucositis noted. No bleeding noted.  She does have enlarged thyroid left more than the right.  RESP:Chest symmetric lungs are clear bilaterally per auscultation. Regular respiratory rate. No wheezes or rhonchi.  CV: Normal S1 S2 Regular, rate and rhythm. No murmurs.    ABD: Nondistended, soft, nontender. Positive bowel sounds. No organomegaly.   EXTREMITIES: No lower extremity edema.   NEURO: Non- focal. Alert and oriented x3.  Cranial nerves appear intact.  PSYCH: Within normal limits. No depression or anxiety.  SKIN: Warm dry intact.       Laboratory data      No results found for this or any previous visit (from the past 168 hour(s)).    [facility], [address] [place] [address], [facility]   [address] [id number]   CASE: [id number]   GENDER: F   DATE OF BIRTH: 1955   PATIENT: DIETER SHOOK   ICD9 Code: C50.412, C50.412    SPECIMEN(S):   A) Breast, lumpectomy, left - oriented (27015) B) Lymph   node(s), sentinel, breast, left (52265)     CLINICAL INFORMATION:    Malignant neoplasm of upper-outer quad of L   breast in female, ER+ (H) [C50.412,  Z17.0]; left lumpectomy,  SLN   biopsy.  [facility] prev path report ([id number]) and [facility] imaging rep (06/29/22)   obtained and reviewed.      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             a) Nuclear grade: Intermediate             b) Patterns: Solid and cribriform, no necrosis             c) Size: 1 mm in greatest measurement, <5% of the tumor   d) Margins: Uninvolved, at 2 mm from the nearest anterior margin, and at   greater distance from other margin        3) ADDITIONAL FINDINGS:        Background abundant adipose tissue with atrophic ductal and lobular   units, and focal weakly proliferative fibrocystic changes, with duct   ectasia and focal cyst formation, apocrine metaplasia, columnar cell   changes and focal usual ductal hyperplasia, no atypia        Benign skin, negative for Paget's disease        4) Previous biopsy site present, with cavity formation and   organizing changes     B) LEFT SENTINEL LYMPH NODE, BIOPSY:        1) ONE OF ONE LYMPH NODE (1/1) POSITIVE FOR METASTATIC CARCINOMA,            MEASURES UP TO 6 x 4 mm        2) FOCAL EXTRANODAL EXTENSION, >2 mm     PATHOLOGIC STAGE: pT2, (sn)pN1a, pM-Not applicable     See staging parameters below     SYNOPTIC REPORT:     INVASIVE CARCINOMA OF THE BREAST: RESECTION   SPECIMENS:     A: BREAST, LUMPECTOMY, LEFT - ORIENTED   B: LYMPH NODE(S), SENTINEL, BREAST, LEFT     SPECIMEN   PROCEDURE:     EXCISION (LESS THAN TOTAL MASTECTOMY)   SPECIMEN LATERALITY:     LEFT   TUMOR   TUMOR SITE:    UPPER OUTER QUADRANT   CLOCK POSITION   1 O'CLOCK   DISTANCE FROM NIPPLE (CENTIMETERS)   3CM   HISTOLOGIC TYPE:    INVASIVE CARCINOMA OF NO SPECIAL TYPE (DUCTAL)    HISTOLOGIC GRADE:   GLANDULAR (ACINAR) / TUBULAR DIFFERENTIATION:   SCORE 2   NUCLEAR PLEOMORPHISM:    SCORE 2   MITOTIC RATE:  SCORE 1   OVERALL GRADE: GRADE 1 (SCORES OF 3, 4 OR 5)   TUMOR SIZE:    24MM X 15MM X 14MM   TUMOR FOCALITY:     SINGLE FOCUS OF INVASIVE CARCINOMA   DUCTAL CARCINOMA IN SITU (DCIS):   PRESENT   NEGATIVE FOR EXTENSIVE INTRADUCTAL COMPONENT (EIC)   SIZE (EXTENT) OF DCIS:   ESTIMATED SIZE (EXTENT) OF DCIS IS AT LEAST   1MM   NUMBER OF BLOCKS WITH DCIS:   5   NUMBER OF BLOCKS EXAMINED:    75   ARCHITECTURAL PATTERNS:  CRIBRIFORM   SOLID   NUCLEAR GRADE: GRADE II (INTERMEDIATE)   NECROSIS: NOT IDENTIFIED   LOBULAR CARCINOMA IN SITU (LCIS):  NOT IDENTIFIED   TUMOR EXTENT   SKIN:     SKIN INVASION: SKIN IS PRESENT AND UNINVOLVED   SKELETAL MUSCLE:    NO SKELETAL MUSCLE IS PRESENT   LYMPHOVASCULAR INVASION: CANNOT BE DETERMINED   FOCI SUSPICIOUS, BUT CAN NOT ENTIRELY EXCLUDE TISSUE FIXATION ARTIFACTSS   DERMAL LYMPHOVASCULAR INVASION:    NOT IDENTIFIED   MICROCALCIFICATIONS:     PRESENT IN INVASIVE CARCINOMA   PRESENT IN NON-NEOPLASTIC TISSUE   TREATMENT EFFECT IN THE BREAST:    NO KNOWN PRESURGICAL THERAPY   MARGINS   INVASIVE CARCINOMA MARGINS:   UNINVOLVED BY INVASIVE CARCINOMA   DISTANCE FROM CLOSEST MARGIN (MILLIMETERS):  2MM   CLOSEST MARGIN(S):  ANTERIOR   ANTERIOR MARGIN:    2   POSTERIOR MARGIN:   17   SUPERIOR MARGIN:    10   INFERIOR MARGIN:    17   MEDIAL MARGIN: 15   LATERAL MARGIN:     23   DCIS MARGINS:  UNINVOLVED BY DCIS   DISTANCE FROM CLOSEST MARGIN (MILLIMETERS):  2MM   CLOSEST MARGIN(S):  ANTERIOR   ANTERIOR MARGIN (MILLIMETERS):     2   POSTERIOR MARGIN (MILLIMETERS):    17   SUPERIOR MARGIN (MILLIMETERS):     10   INFERIOR MARGIN (MILLIMETERS):     17   MEDIAL MARGIN (MILLIMETERS):  15   LATERAL MARGIN (MILLIMETERS): 23   LYMPH NODES   REGIONAL LYMPH NODES:    INVOLVED BY TUMOR CELLS   NUMBER OF LYMPH NODES WITH MACROMETASTASES (> 2 MM):   1   NUMBER OF LYMPH NODES WITH  MICROMETASTASES:  0   NUMBER OF LYMPH NODES WITH ISOLATED TUMOR CELLS (<= 0.2 MM OR <= 200   CELLS):   0   SIZE OF LARGEST METASTATIC DEPOSIT (MILLIMETERS): 6MM   EXTRANODAL EXTENSION:    PRESENT   EXTENT OF EXTRANODAL EXTENSION:   GREATER THAN 2 MM   TOTAL NUMBER OF LYMPH NODES EXAMINED:   1   NUMBER OF SENTINEL NODES EXAMINED: 1   PATHOLOGIC STAGE CLASSIFICATION (PTNM, AJCC 8TH EDITION)   PRIMARY TUMOR (PT): PT2   REGIONAL LYMPH NODES MODIFIER:     (SN): SENTINEL NODE(S) EVALUATED.   REGIONAL LYMPH NODES (PN):    PN1A   ADDITIONAL FINDINGS   ADDITIONAL FINDINGS:   BACKGROUND ABUNDANT ADIPOSE TISSUE WITH ATROPHIC DUCTAL AND LOBULAR   UNITS, AND FOCAL WEAKLY PROLIFERATIVE FIBROCYSTIC CHANGES, WITH DUCT   ECTASIA AND FOCAL CYST FORMATION, APOCRINE METAPLASIA, COLUMNAR CELL   CHANGES AND FOCAL USUAL DUCTAL HYPERPLASIA, NO ATYPIA   BENIGN SKIN, NEGATIVE FOR PAGET'S DISEASE   PREVIOUS BIOPSY SITE PRESENT, WITH CAVITY FORMATION AND ORGANIZING   CHANGES   BREAST BIOMARKER TESTING PERFORMED ON PREVIOUS BIOPSY: ESTROGEN RECEPTOR   (ER) STATUS:   POSITIVE (GREATER THAN 10% OF CELLS DEMONSTRATE NUCLEAR   POSITIVITY)   PERCENTAGE OF CELLS WITH NUCLEAR POSITIVITY: 71-80%   PROGESTERONE RECEPTOR (PGR) STATUS:     POSITIVE   PERCENTAGE OF CELLS WITH NUCLEAR POSITIVITY:   81-90%   HER2 (BY IMMUNOHISTOCHEMISTRY):   NEGATIVE (SCORE 1+)   TESTING PERFORMED ON CASE NUMBER:  PERFORMED IN CORE BIOPSY, [id number]     Imaging results        No results found.      Total time spent was over 45 minutes.  This includes chart prep time, review of her clinical data including notes from outside physicians, labs, review of medical imaging, discussion about her plan of care, documentation and .    This note has been dictated using voice recognition software. Any grammatical or context distortions are unintentional and inherent to the software      Signed by: Quincy Rajput MD, MD

## 2023-06-19 NOTE — LETTER
"    6/19/2023         RE: Kristin Almanzar  [address] [place] [address]        Dear Colleague,    Thank you for referring your patient, Kristin Almanzar, to the [facility] [place]. Please see a copy of my visit note below.    Oncology Rooming Note    June 19, 2023 10:45 AM   Kristin Almanzar is a 67 year old female who presents for:    Chief Complaint   Patient presents with     Oncology Clinic Visit     4 month follow up with prior mammo review related to      Initial Vitals: BP (!) 164/78 (BP Location: Right arm, Patient Position: Sitting)   Pulse 58   Temp 98.5  F (36.9  C) (Oral)   Resp 16   Wt 71.2 kg (157 lb)   SpO2 97%   BMI 24.05 kg/m   Estimated body mass index is 24.05 kg/m  as calculated from the following:    Height as of 2/13/23: 1.721 m (5' 7.75\").    Weight as of this encounter: 71.2 kg (157 lb). Body surface area is 1.84 meters squared.  No Pain (0) Comment: Data Unavailable   No LMP recorded. Patient is postmenopausal.  Allergies reviewed: Yes  Medications reviewed: Yes    Medications: Medication refills not needed today.  Pharmacy name entered into EPIC:    [facility] - [address] DRUG STORE #[id number] - [place] [address] [address] AT [place] [address] & [facility]  [facility] DELIVERY - [address]    Clinical concerns:  none      Vivienne Alejandro RN              [facility] Hematology and Oncology Consult Note    Patient: Kristin Almanzar  MRN: [id number]  Date of Service: Jun 19, 2023           Reason for consultation      Problem List Items Addressed This Visit        Other    Malignant neoplasm of upper-outer quadrant of left breast in female, estrogen receptor positive (H) - Primary   Other Visit Diagnoses     Long term (current) use of aromatase inhibitors                 Assessment / number of problems addressed      1.  A very pleasant 67 year " old woman with invasive ductal carcinoma left breast T2 N1 M0 ER/MN positive HER2/sloane negative status postlumpectomy.  Started on anastrozole in the middle of October 2022.  So far seems to be tolerating it well.  2.  Oncotype score of 16.  Normal bone density at the start.  3.  Good general health otherwise.  4.  Some concern regarding endocrine therapy.  5.  Some degree of anxiety.  6.  Slight vaginal dryness secondary to anastrozole.  7.  Enlarged thyroid gland.  She has not seen the endocrinologist in a while.    Plan and medical decision making      1.  Anastrozole 1 mg p.o. daily.  She will take it until October 2027.  2.  Yearly mammogram.  Next mammogram is due this month.  3.  Diet rich in calcium and vitamin D.  4.  Vaginal hydrating and rejuvenating agents as well as lubricating agents.  5.  Follow-up with regular doctor regarding blood pressure medications.  Continue to range of movement exercises for left shoulder.  6.  We also talked about getting an ultrasound of her thyroid.  If the ultrasound of the thyroid is unchanged and she does really did not need to do anything different for her.  If it does show some worrisome changes then she should contact Dr. Fu at [place] [facility].    Clinical/pathological stage       Cancer Staging   No matching staging information was found for the patient.    History of present illness      Ms. Kristin Almanzar is a 67 year old who has been referred to us for evaluation of left-sided breast cancer diagnosed in July 2022 when she presented for mammogram in June 2022.  The mammogram was abnormal.  She had some additional studies and then a biopsy.  The biopsy confirmed invasive ductal carcinoma.  She underwent surgery on 27th July 2022.  She had lumpectomy with sentinel lymph node biopsy done.  She had 24 mm invasive ductal carcinoma removed.  [place] lymph nodes was positive 1 out of 2 with a 6 mm deposit.  No extracapsular extension.  Margins were  negative.    She has been referred here for consideration of further therapy.  She also had an Oncotype score done.  The Oncotype score is 16.    Besides hypertension she has been very healthy person.  Dr. Obrien is her primary care doctor.    Finished radiation in October 2022.  Started on anastrozole after that.  So far seems to be tolerating it okay.  Did do a bone density before starting and it was normal.    Comes in today for scheduled follow-up.  Overall seems to be doing okay.  No new medical issues.    Detailed review of systems      A 14 point review of systems was obtained.  Positive findings noted in the history.  Rest of the review of system is otherwise negative.       Past medical/surgical/social/family history        Past Medical History:   Diagnosis Date     History of anesthesia complications     vinethane - slow to wake up     Hypertension      PONV (postoperative nausea and vomiting)     as a child had vinethane slow to awake     Past Surgical History:   Procedure Laterality Date     APPENDECTOMY       COLON SURGERY Right     Colectomy - Large polyp     COLPORRHAPHY N/A 6/27/2017    Procedure:  UTEROSACRAL LIGAMENT SUSPENSION CYSTOSCOPY;  Surgeon: Nancy Girard MD;  Location: [facility];  Service:      EYE SURGERY Left     Cataract     HYSTERECTOMY  06/27/2017     HYSTERECTOMY VAGINAL N/A 6/27/2017    Procedure: VAGINAL HYSTERECTOMY;  Surgeon: Nancy Girard MD;  Location: [facility];  Service:      LUMPECTOMY BREAST Left 7/27/2022    Procedure: Left Lumpectpmy; [place] Lymph Node Biopsy;  Surgeon: Anastasia Allen MD;  Location: [facility]     Family History   Problem Relation Age of Onset     Colon Cancer Father 78.00     Breast Cancer Sister 41.00     Cancer Brother 55.00        Prostate       Allergies      Allergies   Allergen Reactions     Amoxicillin Rash     Propoxyphene Rash     Red blotches on face       Physical exam        BP (!) 164/78 (BP Location:  Right arm, Patient Position: Sitting)   Pulse 58   Temp 98.5  F (36.9  C) (Oral)   Resp 16   Wt 71.2 kg (157 lb)   SpO2 97%   BMI 24.05 kg/m      GENERAL: No acute distress. Cooperative in conversation.   HEENT:  Pupils are equal, round and reactive. Oral mucosa is clean and intact. No ulcerations or mucositis noted. No bleeding noted.  She does have enlarged thyroid left more than the right.  RESP:Chest symmetric lungs are clear bilaterally per auscultation. Regular respiratory rate. No wheezes or rhonchi.  CV: Normal S1 S2 Regular, rate and rhythm. No murmurs.    ABD: Nondistended, soft, nontender. Positive bowel sounds. No organomegaly.   EXTREMITIES: No lower extremity edema.   NEURO: Non- focal. Alert and oriented x3.  Cranial nerves appear intact.  PSYCH: Within normal limits. No depression or anxiety.  SKIN: Warm dry intact.       Laboratory data      No results found for this or any previous visit (from the past 168 hour(s)).    [facility], [address] [place] [address], [facility]   [address] [id number]   CASE: [id number]   GENDER: F   DATE OF BIRTH: 1955   PATIENT: DIETER SHOOK   ICD9 Code: C50.412, C50.412    SPECIMEN(S):   A) Breast, lumpectomy, left - oriented (47734) B) Lymph   node(s), sentinel, breast, left (29830)     CLINICAL INFORMATION:    Malignant neoplasm of upper-outer quad of L   breast in female, ER+ (H) [C50.412,  Z17.0]; left lumpectomy, SLN   biopsy.  [facility] prev path report ([id number]) and [facility] imaging rep (06/29/22)   obtained and reviewed.      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             a)  Nuclear grade: Intermediate             b) Patterns: Solid and cribriform, no necrosis             c) Size: 1 mm in greatest measurement, <5% of the tumor   d) Margins: Uninvolved, at 2 mm from the nearest anterior margin, and at   greater distance from other margin        3) ADDITIONAL FINDINGS:        Background abundant adipose tissue with atrophic ductal and lobular   units, and focal weakly proliferative fibrocystic changes, with duct   ectasia and focal cyst formation, apocrine metaplasia, columnar cell   changes and focal usual ductal hyperplasia, no atypia        Benign skin, negative for Paget's disease        4) Previous biopsy site present, with cavity formation and   organizing changes     B) LEFT SENTINEL LYMPH NODE, BIOPSY:        1) ONE OF ONE LYMPH NODE (1/1) POSITIVE FOR METASTATIC CARCINOMA,            MEASURES UP TO 6 x 4 mm        2) FOCAL EXTRANODAL EXTENSION, >2 mm     PATHOLOGIC STAGE: pT2, (sn)pN1a, pM-Not applicable     See staging parameters below     SYNOPTIC REPORT:     INVASIVE CARCINOMA OF THE BREAST: RESECTION   SPECIMENS:     A: BREAST, LUMPECTOMY, LEFT - ORIENTED   B: LYMPH NODE(S), SENTINEL, BREAST, LEFT     SPECIMEN   PROCEDURE:     EXCISION (LESS THAN TOTAL MASTECTOMY)   SPECIMEN LATERALITY:     LEFT   TUMOR   TUMOR SITE:    UPPER OUTER QUADRANT   CLOCK POSITION   1 O'CLOCK   DISTANCE FROM NIPPLE (CENTIMETERS)   3CM   HISTOLOGIC TYPE:    INVASIVE CARCINOMA OF NO SPECIAL TYPE (DUCTAL)   HISTOLOGIC GRADE:   GLANDULAR (ACINAR) / TUBULAR DIFFERENTIATION:   SCORE 2   NUCLEAR PLEOMORPHISM:    SCORE 2   MITOTIC RATE:  SCORE 1   OVERALL GRADE: GRADE 1 (SCORES OF 3, 4 OR 5)   TUMOR SIZE:    24MM X 15MM X 14MM   TUMOR FOCALITY:     SINGLE FOCUS OF INVASIVE CARCINOMA   DUCTAL CARCINOMA IN SITU (DCIS):   PRESENT   NEGATIVE FOR EXTENSIVE INTRADUCTAL COMPONENT (EIC)   SIZE (EXTENT) OF DCIS:   ESTIMATED SIZE (EXTENT) OF DCIS IS AT LEAST   1MM   NUMBER OF BLOCKS WITH DCIS:   5   NUMBER OF BLOCKS  EXAMINED:    75   ARCHITECTURAL PATTERNS:  CRIBRIFORM   SOLID   NUCLEAR GRADE: GRADE II (INTERMEDIATE)   NECROSIS: NOT IDENTIFIED   LOBULAR CARCINOMA IN SITU (LCIS):  NOT IDENTIFIED   TUMOR EXTENT   SKIN:     SKIN INVASION: SKIN IS PRESENT AND UNINVOLVED   SKELETAL MUSCLE:    NO SKELETAL MUSCLE IS PRESENT   LYMPHOVASCULAR INVASION: CANNOT BE DETERMINED   FOCI SUSPICIOUS, BUT CAN NOT ENTIRELY EXCLUDE TISSUE FIXATION ARTIFACTSS   DERMAL LYMPHOVASCULAR INVASION:    NOT IDENTIFIED   MICROCALCIFICATIONS:     PRESENT IN INVASIVE CARCINOMA   PRESENT IN NON-NEOPLASTIC TISSUE   TREATMENT EFFECT IN THE BREAST:    NO KNOWN PRESURGICAL THERAPY   MARGINS   INVASIVE CARCINOMA MARGINS:   UNINVOLVED BY INVASIVE CARCINOMA   DISTANCE FROM CLOSEST MARGIN (MILLIMETERS):  2MM   CLOSEST MARGIN(S):  ANTERIOR   ANTERIOR MARGIN:    2   POSTERIOR MARGIN:   17   SUPERIOR MARGIN:    10   INFERIOR MARGIN:    17   MEDIAL MARGIN: 15   LATERAL MARGIN:     23   DCIS MARGINS:  UNINVOLVED BY DCIS   DISTANCE FROM CLOSEST MARGIN (MILLIMETERS):  2MM   CLOSEST MARGIN(S):  ANTERIOR   ANTERIOR MARGIN (MILLIMETERS):     2   POSTERIOR MARGIN (MILLIMETERS):    17   SUPERIOR MARGIN (MILLIMETERS):     10   INFERIOR MARGIN (MILLIMETERS):     17   MEDIAL MARGIN (MILLIMETERS):  15   LATERAL MARGIN (MILLIMETERS): 23   LYMPH NODES   REGIONAL LYMPH NODES:    INVOLVED BY TUMOR CELLS   NUMBER OF LYMPH NODES WITH MACROMETASTASES (> 2 MM):   1   NUMBER OF LYMPH NODES WITH MICROMETASTASES:  0   NUMBER OF LYMPH NODES WITH ISOLATED TUMOR CELLS (<= 0.2 MM OR <= 200   CELLS):   0   SIZE OF LARGEST METASTATIC DEPOSIT (MILLIMETERS): 6MM   EXTRANODAL EXTENSION:    PRESENT   EXTENT OF EXTRANODAL EXTENSION:   GREATER THAN 2 MM   TOTAL NUMBER OF LYMPH NODES EXAMINED:   1   NUMBER OF SENTINEL NODES EXAMINED: 1   PATHOLOGIC STAGE CLASSIFICATION (PTNM, AJCC 8TH EDITION)   PRIMARY TUMOR (PT): PT2   REGIONAL LYMPH NODES MODIFIER:     (SN): SENTINEL NODE(S) EVALUATED.   REGIONAL LYMPH  NODES (PN):    PN1A   ADDITIONAL FINDINGS   ADDITIONAL FINDINGS:   BACKGROUND ABUNDANT ADIPOSE TISSUE WITH ATROPHIC DUCTAL AND LOBULAR   UNITS, AND FOCAL WEAKLY PROLIFERATIVE FIBROCYSTIC CHANGES, WITH DUCT   ECTASIA AND FOCAL CYST FORMATION, APOCRINE METAPLASIA, COLUMNAR CELL   CHANGES AND FOCAL USUAL DUCTAL HYPERPLASIA, NO ATYPIA   BENIGN SKIN, NEGATIVE FOR PAGET'S DISEASE   PREVIOUS BIOPSY SITE PRESENT, WITH CAVITY FORMATION AND ORGANIZING   CHANGES   BREAST BIOMARKER TESTING PERFORMED ON PREVIOUS BIOPSY: ESTROGEN RECEPTOR   (ER) STATUS:   POSITIVE (GREATER THAN 10% OF CELLS DEMONSTRATE NUCLEAR   POSITIVITY)   PERCENTAGE OF CELLS WITH NUCLEAR POSITIVITY: 71-80%   PROGESTERONE RECEPTOR (PGR) STATUS:     POSITIVE   PERCENTAGE OF CELLS WITH NUCLEAR POSITIVITY:   81-90%   HER2 (BY IMMUNOHISTOCHEMISTRY):   NEGATIVE (SCORE 1+)   TESTING PERFORMED ON CASE NUMBER:  PERFORMED IN CORE BIOPSY, [id number]     Imaging results        No results found.      Total time spent was over 45 minutes.  This includes chart prep time, review of her clinical data including notes from outside physicians, labs, review of medical imaging, discussion about her plan of care, documentation and .    This note has been dictated using voice recognition software. Any grammatical or context distortions are unintentional and inherent to the software      Signed by: Quincy Rajput MD, MD        Again, thank you for allowing me to participate in the care of your patient.        Sincerely,        Quincy Rajput MD, MD

## 2023-06-19 NOTE — PROGRESS NOTES
"Oncology Rooming Note    June 19, 2023 10:45 AM   Kristin Almanzar is a 67 year old female who presents for:    Chief Complaint   Patient presents with     Oncology Clinic Visit     4 month follow up with prior mammo review related to      Initial Vitals: BP (!) 164/78 (BP Location: Right arm, Patient Position: Sitting)   Pulse 58   Temp 98.5  F (36.9  C) (Oral)   Resp 16   Wt 71.2 kg (157 lb)   SpO2 97%   BMI 24.05 kg/m   Estimated body mass index is 24.05 kg/m  as calculated from the following:    Height as of 2/13/23: 1.721 m (5' 7.75\").    Weight as of this encounter: 71.2 kg (157 lb). Body surface area is 1.84 meters squared.  No Pain (0) Comment: Data Unavailable   No LMP recorded. Patient is postmenopausal.  Allergies reviewed: Yes  Medications reviewed: Yes    Medications: Medication refills not needed today.  Pharmacy name entered into EPIC:    [facility]  [facility] - [address] DRUG STORE #[id number] - [place] [address] [address] AT [place] [address] & [facility]  [facility] - [address]    Clinical concerns:  none      Vivienne Alejandro RN            "

## 2023-06-19 NOTE — TELEPHONE ENCOUNTER
Klor request too soon    Last Written Prescription Date:  7/31/22  Last Fill Quantity: 90,  # refills: 3     Viviana Odell RN 06/19/23 1:22 AM

## 2023-06-20 ENCOUNTER — MYC MEDICAL ADVICE (OUTPATIENT)
Dept: INTERNAL MEDICINE | Facility: CLINIC | Age: 68
End: 2023-06-20
Payer: COMMERCIAL

## 2023-06-20 DIAGNOSIS — I10 HTN (HYPERTENSION): ICD-10-CM

## 2023-06-20 DIAGNOSIS — I10 ESSENTIAL HYPERTENSION: Primary | ICD-10-CM

## 2023-06-20 RX ORDER — POTASSIUM CHLORIDE 1500 MG/1
20 TABLET, EXTENDED RELEASE ORAL DAILY
Qty: 90 TABLET | Refills: 11 | Status: SHIPPED | OUTPATIENT
Start: 2023-06-20 | End: 2024-03-12

## 2023-06-23 NOTE — TELEPHONE ENCOUNTER
Received fax from pharmacy regarding the refill request. They state no refills on file.       Looks like the last fill would have been in April or so, meaning pt will be running out.       Please advise on refill.      Thank you,  Darren Espino Jr., CMA on 6/23/2023 at 4:58 PM

## 2023-06-24 RX ORDER — ATENOLOL AND CHLORTHALIDONE TABLET 50; 25 MG/1; MG/1
1 TABLET ORAL DAILY
Qty: 90 TABLET | Refills: 2 | Status: SHIPPED | OUTPATIENT
Start: 2023-06-24 | End: 2024-03-11

## 2023-06-26 ENCOUNTER — HOSPITAL ENCOUNTER (OUTPATIENT)
Dept: ULTRASOUND IMAGING | Facility: HOSPITAL | Age: 68
Discharge: HOME | End: 2023-06-26
Attending: INTERNAL MEDICINE | Admitting: INTERNAL MEDICINE
Payer: COMMERCIAL

## 2023-06-26 DIAGNOSIS — E04.9 NON-TOXIC NODULAR GOITER: ICD-10-CM

## 2023-06-26 PROCEDURE — 76536 US EXAM OF HEAD AND NECK: CPT

## 2023-07-03 ENCOUNTER — ANCILLARY PROCEDURE (OUTPATIENT)
Dept: MAMMOGRAPHY | Facility: CLINIC | Age: 68
End: 2023-07-03
Attending: SPECIALIST
Payer: COMMERCIAL

## 2023-07-03 DIAGNOSIS — Z17.0 MALIGNANT NEOPLASM OF UPPER-OUTER QUADRANT OF LEFT BREAST IN FEMALE, ESTROGEN RECEPTOR POSITIVE (H): ICD-10-CM

## 2023-07-03 DIAGNOSIS — C50.412 MALIGNANT NEOPLASM OF UPPER-OUTER QUADRANT OF LEFT BREAST IN FEMALE, ESTROGEN RECEPTOR POSITIVE (H): ICD-10-CM

## 2023-07-03 PROCEDURE — 77066 DX MAMMO INCL CAD BI: CPT

## 2023-08-06 ENCOUNTER — HEALTH MAINTENANCE LETTER (OUTPATIENT)
Age: 68
End: 2023-08-06

## 2023-08-15 ENCOUNTER — OFFICE VISIT (OUTPATIENT)
Dept: SURGERY | Facility: CLINIC | Age: 68
End: 2023-08-15
Attending: INTERNAL MEDICINE
Payer: COMMERCIAL

## 2023-08-15 DIAGNOSIS — Z85.3 PERSONAL HISTORY OF MALIGNANT NEOPLASM OF BREAST: Primary | ICD-10-CM

## 2023-08-15 PROCEDURE — G0463 HOSPITAL OUTPT CLINIC VISIT: HCPCS | Performed by: SPECIALIST

## 2023-08-15 PROCEDURE — 99213 OFFICE O/P EST LOW 20 MIN: CPT | Performed by: SPECIALIST

## 2023-08-15 NOTE — NURSING NOTE
Kristin presents to [facility] for a follow up appointment with Dr. Allen .Patient notes no new breast concerns.  Patient had mammogram done recently, see report for details.  She is following with Dr. Rajput  for medical oncology and is taking endocrine therapy, tolerating it well.  RN assessment and EMRupdate.  Patient met with Dr. Allen .  See dictation for details of visit and follow up plan.  Support provided, invited calls.  RN time 12 mins.

## 2023-08-15 NOTE — LETTER
8/15/2023         RE: Kristin Almanzar  [address] [place] [address]        Dear Colleague,    Thank you for referring your patient, Kristin Almanzar, to the [facility] [place]. Please see a copy of my visit note below.    This is a 67 year old woman who comes in for  continued follow-up of her left breast cancer.  She is now 12 months  status post left  lumpectomy  with radiation.  She is on anastrozole.  She is feeling well.  She has no new complaints today.      Please see the chart review for PMH, Meds, allergies, FH and SH.    ROS:  Pertinent items are noted in HPI.      Physical Exam:  There were no vitals taken for this visit.  General appearance: alert, appears stated age, and cooperative  Breasts: There are no palpable masses. There are minimal radiation changes. The scar(s) has(ve) healed up well.  Lymph nodes: Cervical, supraclavicular, and axillary nodes normal.  Neurologic: Grossly normal    Data Review: Reviewed her current mammograms. No evidence of disease.      Impression: Personal History of breast cancer. NOD.  Is overall doing very well.  Discussed with the patient that follow-up with myself can now be as needed.  She will be continuing with yearly mammograms and following up with her primary care physician and oncologist.    Plan: Follow up as needed.  Continue with yearly mammograms and continue to follow-up with oncology.          Again, thank you for allowing me to participate in the care of your patient.        Sincerely,        Anastasia Allen MD

## 2023-08-23 ASSESSMENT — ENCOUNTER SYMPTOMS
WEAKNESS: 0
DIARRHEA: 0
NAUSEA: 0
SHORTNESS OF BREATH: 0
SORE THROAT: 0
BREAST MASS: 0
FREQUENCY: 0
COUGH: 0
HEARTBURN: 0
HEADACHES: 0
EYE PAIN: 0
JOINT SWELLING: 0
CHILLS: 0
FEVER: 0
MYALGIAS: 0
HEMATURIA: 0
DIZZINESS: 0
NERVOUS/ANXIOUS: 0
DYSURIA: 0
PALPITATIONS: 0
ABDOMINAL PAIN: 0
CONSTIPATION: 0
PARESTHESIAS: 0
ARTHRALGIAS: 1
HEMATOCHEZIA: 0

## 2023-08-23 ASSESSMENT — ACTIVITIES OF DAILY LIVING (ADL): CURRENT_FUNCTION: NO ASSISTANCE NEEDED

## 2023-08-24 ENCOUNTER — OFFICE VISIT (OUTPATIENT)
Dept: INTERNAL MEDICINE | Facility: CLINIC | Age: 68
End: 2023-08-24
Payer: COMMERCIAL

## 2023-08-24 VITALS
OXYGEN SATURATION: 99 % | BODY MASS INDEX: 24.38 KG/M2 | SYSTOLIC BLOOD PRESSURE: 138 MMHG | HEIGHT: 68 IN | RESPIRATION RATE: 16 BRPM | WEIGHT: 160.9 LBS | HEART RATE: 62 BPM | DIASTOLIC BLOOD PRESSURE: 86 MMHG

## 2023-08-24 DIAGNOSIS — Z00.00 ROUTINE GENERAL MEDICAL EXAMINATION AT A HEALTH CARE FACILITY: Primary | ICD-10-CM

## 2023-08-24 DIAGNOSIS — Z11.59 NEED FOR HEPATITIS C SCREENING TEST: ICD-10-CM

## 2023-08-24 DIAGNOSIS — Z00.00 ENCOUNTER FOR MEDICARE ANNUAL WELLNESS EXAM: ICD-10-CM

## 2023-08-24 LAB
ALBUMIN SERPL BCG-MCNC: 4.3 G/DL (ref 3.5–5.2)
ALBUMIN UR-MCNC: NEGATIVE MG/DL
ALP SERPL-CCNC: 54 U/L (ref 35–104)
ALT SERPL W P-5'-P-CCNC: 20 U/L (ref 0–50)
ANION GAP SERPL CALCULATED.3IONS-SCNC: 10 MMOL/L (ref 7–15)
APPEARANCE UR: CLEAR
AST SERPL W P-5'-P-CCNC: 22 U/L (ref 0–45)
BACTERIA #/AREA URNS HPF: ABNORMAL /HPF
BILIRUB SERPL-MCNC: 0.3 MG/DL
BILIRUB UR QL STRIP: NEGATIVE
BUN SERPL-MCNC: 14.6 MG/DL (ref 8–23)
CALCIUM SERPL-MCNC: 9.8 MG/DL (ref 8.8–10.2)
CHLORIDE SERPL-SCNC: 101 MMOL/L (ref 98–107)
CHOLEST SERPL-MCNC: 200 MG/DL
COLOR UR AUTO: YELLOW
CREAT SERPL-MCNC: 0.61 MG/DL (ref 0.51–0.95)
DEPRECATED HCO3 PLAS-SCNC: 29 MMOL/L (ref 22–29)
ERYTHROCYTE [DISTWIDTH] IN BLOOD BY AUTOMATED COUNT: 12.2 % (ref 10–15)
GFR SERPL CREATININE-BSD FRML MDRD: >90 ML/MIN/1.73M2
GLUCOSE SERPL-MCNC: 101 MG/DL (ref 70–99)
GLUCOSE UR STRIP-MCNC: NEGATIVE MG/DL
HCT VFR BLD AUTO: 39.2 % (ref 35–47)
HDLC SERPL-MCNC: 60 MG/DL
HGB BLD-MCNC: 13.8 G/DL (ref 11.7–15.7)
HGB UR QL STRIP: ABNORMAL
KETONES UR STRIP-MCNC: NEGATIVE MG/DL
LDLC SERPL CALC-MCNC: 126 MG/DL
LEUKOCYTE ESTERASE UR QL STRIP: NEGATIVE
MCH RBC QN AUTO: 32.1 PG (ref 26.5–33)
MCHC RBC AUTO-ENTMCNC: 35.2 G/DL (ref 31.5–36.5)
MCV RBC AUTO: 91 FL (ref 78–100)
NITRATE UR QL: NEGATIVE
NONHDLC SERPL-MCNC: 140 MG/DL
PH UR STRIP: 6.5 [PH] (ref 5–8)
PLATELET # BLD AUTO: 236 10E3/UL (ref 150–450)
POTASSIUM SERPL-SCNC: 3.7 MMOL/L (ref 3.4–5.3)
PROT SERPL-MCNC: 6.7 G/DL (ref 6.4–8.3)
RBC # BLD AUTO: 4.3 10E6/UL (ref 3.8–5.2)
RBC #/AREA URNS AUTO: ABNORMAL /HPF
SODIUM SERPL-SCNC: 140 MMOL/L (ref 136–145)
SP GR UR STRIP: 1.02 (ref 1–1.03)
SQUAMOUS #/AREA URNS AUTO: ABNORMAL /LPF
TRIGL SERPL-MCNC: 72 MG/DL
TSH SERPL DL<=0.005 MIU/L-ACNC: 1.77 UIU/ML (ref 0.3–4.2)
UROBILINOGEN UR STRIP-ACNC: 0.2 E.U./DL
WBC # BLD AUTO: 5.1 10E3/UL (ref 4–11)
WBC #/AREA URNS AUTO: ABNORMAL /HPF

## 2023-08-24 PROCEDURE — 36415 COLL VENOUS BLD VENIPUNCTURE: CPT | Performed by: INTERNAL MEDICINE

## 2023-08-24 PROCEDURE — G0438 PPPS, INITIAL VISIT: HCPCS | Performed by: INTERNAL MEDICINE

## 2023-08-24 PROCEDURE — 85027 COMPLETE CBC AUTOMATED: CPT | Performed by: INTERNAL MEDICINE

## 2023-08-24 PROCEDURE — 84443 ASSAY THYROID STIM HORMONE: CPT | Performed by: INTERNAL MEDICINE

## 2023-08-24 PROCEDURE — 81001 URINALYSIS AUTO W/SCOPE: CPT | Performed by: INTERNAL MEDICINE

## 2023-08-24 PROCEDURE — 80053 COMPREHEN METABOLIC PANEL: CPT | Performed by: INTERNAL MEDICINE

## 2023-08-24 PROCEDURE — 80061 LIPID PANEL: CPT | Performed by: INTERNAL MEDICINE

## 2023-08-24 ASSESSMENT — ENCOUNTER SYMPTOMS
SHORTNESS OF BREATH: 0
FREQUENCY: 0
SORE THROAT: 0
COUGH: 0
HEADACHES: 0
HEARTBURN: 0
DIZZINESS: 0
HEMATURIA: 0
ABDOMINAL PAIN: 0
FEVER: 0
DYSURIA: 0
CONSTIPATION: 0
JOINT SWELLING: 0
MYALGIAS: 0
PARESTHESIAS: 0
HEMATOCHEZIA: 0
CHILLS: 0
PALPITATIONS: 0
BREAST MASS: 0
NAUSEA: 0
EYE PAIN: 0
DIARRHEA: 0
WEAKNESS: 0
NERVOUS/ANXIOUS: 0
ARTHRALGIAS: 1

## 2023-08-24 ASSESSMENT — PAIN SCALES - GENERAL: PAINLEVEL: NO PAIN (0)

## 2023-08-24 ASSESSMENT — ACTIVITIES OF DAILY LIVING (ADL): CURRENT_FUNCTION: NO ASSISTANCE NEEDED

## 2023-08-24 NOTE — PROGRESS NOTES
Annual Wellness Visit:  Kristin Almanzar  is a 67 year old female  who presents for an annual wellness visit.  Annual wellness visit physical exam we had a good discussion.  Position and patient sharing was accomplished.  I do not prescribe this patient any opioids.  She does not receive opioids from any other provider.  Provider list includes myself Dr. Rajput from oncology Dr. Allen from breast cancer surgery Dr. Edwardo [facility]dr[facility] colorectal [facility] see below.  Cognitive assessment was done and normal.  She was able to remember 3 items without problem and draw the face of an analog clock indicating appropriate time.  Labs ordered today include hemogram comprehensive metabolic profile lipid panel TSH urinalysis.  We had a good discussion.  Emotional mental health was assessed and normal.  Functional capacity ADL's and safety in the home was assessed all clear and good no issues.    's blood sugar is improving slightly but not normal.  He is a patient of this examiner and being treated for type 2 diabetes.  He takes multiple medications.    Advance care planning done.    Falls risk assessment also accomplished.    Cognitive assessment was completed and the current provider this examiner and patient sharing was also completed.  Assessment/Plan:  Annual wellness visit and physical examination.  We had a good discussion.    Subjective:   Medical History:    Non-smoker.    Social alcohol.  Allergy amoxicillin and propoxyphene        Colectomy right-sided for colonic polyp dysplastic villous adenoma with high propensity to develop into colon cancer.  Dr. STAHL colorectal surgery group presiding.    Bilateral cataract surgery left breast cancer surgery lumpectomy followed by XRT now on Arimidex.  Well-tolerated normal effects.  Past health history also includes hypertension.  Treated by this examiner no history of target organ damage related to same.  Past Medical History:   Diagnosis Date    History of anesthesia  complications     vinethane - slow to wake up    Hypertension     PONV (postoperative nausea and vomiting)     as a child had vinethane slow to awake     Current Outpatient Medications   Medication    acetaminophen (TYLENOL) 500 MG tablet    anastrozole (ARIMIDEX) 1 MG tablet    atenolol-chlorthalidone (TENORETIC) 50-25 MG tablet    lisinopril (ZESTRIL) 10 MG tablet    potassium chloride ER (KLOR-CON) 20 MEQ CR tablet     No current facility-administered medications for this visit.     Immunization History   Administered Date(s) Administered    COVID-19 MONOVALENT 12+ (Pfizer) 2021, 2021, 2021, 10/26/2021    COVID-19 Monovalent 12+ (Pfizer ) 2022    DT (PEDS <7y) 1999    Flu, Unspecified 10/26/2020, 2021    Influenza Vaccine 65+ (Fluzone HD) 10/26/2020, 2021, 2022    Influenza Vaccine >6 months (Alfuria,Fluzone) 2019    Influenza,INJ,MDCK,PF,Quad >6mo(Flucelvax) 2017    TDAP (Adacel,Boostrix) 10/14/2010, 2020    Td,adult,historic,unspecified 1999       Surgical History:  Past Surgical History:   Procedure Laterality Date    APPENDECTOMY      COLON SURGERY Right     Colectomy - Large polyp    COLPORRHAPHY N/A 2017    Procedure:  UTEROSACRAL LIGAMENT SUSPENSION CYSTOSCOPY;  Surgeon: Nancy Girard MD;  Location: [facility] OR;  Service:     EYE SURGERY Left     Cataract    HYSTERECTOMY  2017    HYSTERECTOMY VAGINAL N/A 2017    Procedure: VAGINAL HYSTERECTOMY;  Surgeon: Nancy Girard MD;  Location: [facility] OR;  Service:     LUMPECTOMY BREAST Left 2022    Procedure: Left Lumpectpmy; [place] Lymph Node Biopsy;  Surgeon: Anastasia Allen MD;  Location: [facility]        Family History:  Mother  of cerebral hemorrhage 86.    Mother  complications of colon cancer 81.  2 children well 2 grandchildren well.   with type 2 diabetes nonobese may ultimately require insulin.  Multiple  "medications blood sugar not normalized yet.    Social History:  Exercises regularly.  Enjoys her family especially the grandchildren.    Health Maintenances:  Recent mammogram allCLEAR.  Follow-up with colonoscopy with Dr. STAHL colorectal surgery [facility].  Seen by oncology as well as Dr. EAGLE Henson from general breast cancer surgery center.  Vaccine status reviewed and up-to-date.  Immunizations reviewed up-to-date.    Objective:  /86 (BP Location: Right arm, Patient Position: Sitting, Cuff Size: Adult Regular)   Pulse 62   Resp 16   Ht 1.721 m (5' 7.75\")   Wt 73 kg (160 lb 14.4 oz)   LMP  (LMP Unknown)   SpO2 99%   Breastfeeding No   BMI 24.65 kg/m    Easily conversant good spirited appears younger than stated age physically fit.  Neuromuscular tone is good.  Back straight no severe spine tenderness.  Chest clear heart tones normal abdomen benign negative extremities free of edema cyanosis or clubbing skin negative lymph negative neuro negative HEENT grossly intact back straight no severe spine tenderness neck was supple there was a large goiter 40 to 50 g smooth without nodularity.  No carotid bruits no thyromegaly previously the goiter has been evaluated by endocrine service today TSH levels pending.  Clinically euthyroid.    Kaiden Pruitt MD    Internal MedicineAnswers submitted by the patient for this visit:  Annual Preventive Visit (Submitted on 8/23/2023)  Chief Complaint: Annual Exam:  In general, how would you rate your overall physical health?: good  Frequency of exercise:: 4-5 days/week  Do you usually eat at least 4 servings of fruit and vegetables a day, include whole grains & fiber, and avoid regularly eating high fat or \"junk\" foods? : Yes  Taking medications regularly:: Yes  Medication side effects:: None  Activities of Daily Living: no assistance needed  Home safety: lack of grab bars in the bathroom  Hearing Impairment:: need to ask people to speak up or repeat themselves, difficulty " understanding soft or whispered speech  In the past 6 months, have you been bothered by leaking of urine?: No  abdominal pain: No  Blood in stool: No  Blood in urine: No  chest pain: No  chills: No  congestion: No  constipation: No  cough: No  diarrhea: No  dizziness: No  ear pain: No  eye pain: No  nervous/anxious: No  fever: No  frequency: No  genital sores: No  headaches: No  hearing loss: Yes  heartburn: No  arthralgias: Yes  joint swelling: No  peripheral edema: No  mood changes: No  myalgias: No  nausea: No  dysuria: No  palpitations: No  Skin sensation changes: No  sore throat: No  urgency: No  rash: No  shortness of breath: No  visual disturbance: No  weakness: No  pelvic pain: No  vaginal bleeding: No  vaginal discharge: No  tenderness: No  breast mass: No  breast discharge: No  In general, how would you rate your overall mental or emotional health?: excellent  Additional concerns today:: No  Exercise outside of work (Submitted on 8/23/2023)  Chief Complaint: Annual Exam:  Duration of exercise:: 15-30 minutes    The patient was provided with written information regarding signs of hearing loss.

## 2023-08-24 NOTE — PROGRESS NOTES
"SUBJECTIVE:   Kristin is a 67 year old who presents for Preventive Visit.      8/24/2023     7:13 AM   Additional Questions   Roomed by Darren BALL CMA       Are you in the first 12 months of your Medicare coverage?  No    Healthy Habits:     In general, how would you rate your overall health?  Good    Frequency of exercise:  4-5 days/week    Duration of exercise:  15-30 minutes    Do you usually eat at least 4 servings of fruit and vegetables a day, include whole grains    & fiber and avoid regularly eating high fat or \"junk\" foods?  Yes    Taking medications regularly:  Yes    Medication side effects:  None    Ability to successfully perform activities of daily living:  No assistance needed    Home Safety:  Lack of grab bars in the bathroom    Hearing Impairment:  Need to ask people to speak up or repeat themselves and difficulty understanding soft or whispered speech    In the past 6 months, have you been bothered by leaking of urine?  No    In general, how would you rate your overall mental or emotional health?  Excellent    Additional concerns today:  No        Have you ever done Advance Care Planning? (For example, a Health Directive, POLST, or a discussion with a medical provider or your loved ones about your wishes): Yes, patient states has an Advance Care Planning document and will bring a copy to the clinic.       Fall risk  Fallen 2 or more times in the past year?: No  Any fall with injury in the past year?: No    Cognitive Screening   1) Repeat 3 items (Leader, Season, Table)    2) Clock draw: NORMAL  3) 3 item recall: Recalls 3 objects  Results: 3 items recalled: COGNITIVE IMPAIRMENT LESS LIKELY    Mini-CogTM Copyright DANIEL Calderon. Licensed by the author for use in [facility]; reprinted with permission (abdulaziz@.[facility]). All rights reserved.      Do you have sleep apnea, excessive snoring or daytime drowsiness? : no    Reviewed and updated as needed this visit by clinical staff   Tobacco  Allergies "  Meds              Reviewed and updated as needed this visit by Provider                 Social History     Tobacco Use    Smoking status: Never    Smokeless tobacco: Never   Substance Use Topics    Alcohol use: Not Currently     Comment: Alcoholic Drinks/day: rare     {Rooming staff  Click this link to complete the Prescreen if response below is not for today's visit  Alcohol Use Prescreen >3 drinks/day or > 7 drinks/week.  If the prescreen question answer is YES, complete the full AUDIT  :361074}        8/23/2023    11:25 AM   Alcohol Use   Prescreen: >3 drinks/day or >7 drinks/week? Not Applicable   {add AUDIT responses (Optional) (A score of 7 for adult men is an indication of hazardous drinking; a score of 8 or more is an indication of an alcohol use disorder.  A score of 7 or more for adult women is an indication of hazardous drinking or an alchohol use disorder):062146}  Do you have a current opioid prescription? { :229444}  Do you use any other controlled substances or medications that are not prescribed by a provider? {Substance Use :517685}  {Provider  If there are gaps in the social history shown above, please follow the link and refresh the note Link to Social and Substance History :685488}    {Outside tests to abstract? :823839}    {additional problems to add (Optional):338381}    Current providers sharing in care for this patient include: {Rooming staff:  Please update Care Team from storyboard, refresh this note and then delete this statement}  Patient Care Team:  Kaiden Pruitt MD as PCP - General (Internal Medicine)  Kaiden Pruitt MD as Assigned PCP  Anastasia Allen MD as Assigned Surgical Provider  Vivienne Alejandro RN as Specialty Care Coordinator (Hematology & Oncology)  Aviva Bess MD as Assigned Cancer Care Provider    The following health maintenance items are reviewed in Epic and correct as of today:  Health Maintenance   Topic Date Due    ANNUAL REVIEW OF   ORDERS  Never done    ADVANCE CARE PLANNING  Never done    Pneumococcal Vaccine: 65+ Years (1 - PCV) Never done    HEPATITIS C SCREENING  Never done    ZOSTER IMMUNIZATION (1 of 2) Never done    COVID-19 Vaccine (6 - Booster for Pfizer series) 07/04/2022    MEDICARE ANNUAL WELLNESS VISIT  06/20/2023    INFLUENZA VACCINE (1) 09/01/2023    FALL RISK ASSESSMENT  08/24/2024    MAMMO SCREENING  07/03/2025    LIPID  06/20/2027    COLORECTAL CANCER SCREENING  09/29/2027    DTAP/TDAP/TD IMMUNIZATION (3 - Td or Tdap) 11/27/2030    DEXA  11/11/2037    PHQ-2 (once per calendar year)  Completed    IPV IMMUNIZATION  Aged Out    MENINGITIS IMMUNIZATION  Aged Out     {Chronicprobdata (optional):169961}  {Decision Support (Optional):693573}    FHS-7:       6/13/2022    10:13 AM 6/16/2022    12:05 PM 7/3/2023     7:20 AM   Breast CA Risk Assessment (FHS-7)   Did any of your first-degree relatives have breast or ovarian cancer? Yes Yes Yes   Did any of your relatives have bilateral breast cancer? No No No   Did any man in your family have breast cancer? No No No   Did any woman in your family have breast and ovarian cancer? No Yes No   Did any woman in your family have breast cancer before age 50 y? Yes Yes Yes   Do you have 2 or more relatives with breast and/or ovarian cancer? No No No   Do you have 2 or more relatives with breast and/or bowel cancer? No No Yes     {If any of the questions to the BCRA (FHS-7) are answered yes, consider ordering referral for genetic counseling (Optional) :878788}  {AMB Mammogram Decision Support (Optional) :711422}  Pertinent mammograms are reviewed under the imaging tab.    Review of Systems   Constitutional:  Negative for chills and fever.   HENT:  Positive for hearing loss. Negative for congestion, ear pain and sore throat.    Eyes:  Negative for pain and visual disturbance.   Respiratory:  Negative for cough and shortness of breath.    Cardiovascular:  Negative for chest pain, palpitations and  "peripheral edema.   Gastrointestinal:  Negative for abdominal pain, constipation, diarrhea, heartburn, hematochezia and nausea.   Breasts:  Negative for tenderness, breast mass and discharge.   Genitourinary:  Negative for dysuria, frequency, genital sores, hematuria, pelvic pain, urgency, vaginal bleeding and vaginal discharge.   Musculoskeletal:  Positive for arthralgias. Negative for joint swelling and myalgias.   Skin:  Negative for rash.   Neurological:  Negative for dizziness, weakness, headaches and paresthesias.   Psychiatric/Behavioral:  Negative for mood changes. The patient is not nervous/anxious.      {ROS COMP (Optional):569410}    OBJECTIVE:   LMP  (LMP Unknown)   Breastfeeding No  Estimated body mass index is 24.05 kg/m  as calculated from the following:    Height as of 23: 1.721 m (5' 7.75\").    Weight as of 23: 71.2 kg (157 lb).  Physical Exam  {Exam (Optional) :142359}    {Diagnostic Test Results (Optional):902073}    ASSESSMENT / PLAN:   {Diag Picklist:486142}    {Patient advised of split billing (Optional):677862}      COUNSELING:  {Medicare Counselin}        She reports that she has never smoked. She has never used smokeless tobacco.      Appropriate preventive services were discussed with this patient, including applicable screening as appropriate for cardiovascular disease, diabetes, osteopenia/osteoporosis, and glaucoma.  As appropriate for age/gender, discussed screening for colorectal cancer, prostate cancer, breast cancer, and cervical cancer. Checklist reviewing preventive services available has been given to the patient.    Reviewed patients plan of care and provided an AVS. The {CarePlan:620287} for Kristin meets the Care Plan requirement. This Care Plan has been established and reviewed with the {PATIENT, FAMILY MEMBER, CAREGIVER:934950}.    {Counseling Resources  [place] Preventive Services Task Force  Cholesterol Screening  Health diet/nutrition  Pooled Cohorts Equation " Calculator  USDA's MyPlate  ASA Prophylaxis  Lung CA Screening  Osteoporosis prevention/bone health :764275}  {Breast Cancer Risk Calculator  BRCA-Related Cancer Risk Assessment FHS-7 Tool :978191}    Kaiden Pruitt MD  [facility]    Identified Health Risks:  {Medicare required documentation of substance and opioid use disorders screening :516277}

## 2023-08-24 NOTE — PATIENT INSTRUCTIONS
Patient Education   Personalized Prevention Plan  You are due for the preventive services outlined below.  Your care team is available to assist you in scheduling these services.  If you have already completed any of these items, please share that information with your care team to update in your medical record.  Health Maintenance Due   Topic Date Due     ANNUAL REVIEW OF HM ORDERS  Never done     Pneumococcal Vaccine (1 - PCV) Never done     Hepatitis C Screening  Never done     Zoster (Shingles) Vaccine (1 of 2) Never done     COVID-19 Vaccine (6 - Booster for Pfizer series) 07/04/2022     Hearing Loss: Care Instructions  Overview     Hearing loss is a sudden or slow decrease in how well you hear. It can range from slight to profound. Permanent hearing loss can occur with aging. It also can happen when you are exposed long-term to loud noise. Examples include listening to loud music, riding motorcycles, or being around other loud machines.  Hearing loss can affect your work and home life. It can make you feel lonely or depressed. You may feel that you have lost your independence. But hearing aids and other devices can help you hear better and feel connected to others.  Follow-up care is a key part of your treatment and safety. Be sure to make and go to all appointments, and call your doctor if you are having problems. It's also a good idea to know your test results and keep a list of the medicines you take.  How can you care for yourself at home?  Avoid loud noises whenever possible. This helps keep your hearing from getting worse.  Always wear hearing protection around loud noises.  Wear a hearing aid as directed.  A professional can help you pick a hearing aid that will work best for you.  You can also get hearing aids over the counter for mild to moderate hearing loss.  Have hearing tests as your doctor suggests. They can show whether your hearing has changed. Your hearing aid may need to be adjusted.  Use  "other devices as needed. These may include:  Telephone amplifiers and hearing aids that can connect to a television, stereo, radio, or microphone.  Devices that use lights or vibrations. These alert you to the doorbell, a ringing telephone, or a baby monitor.  Television closed-captioning. This shows the words at the bottom of the screen. Most new TVs can do this.  TTY (text telephone). This lets you type messages back and forth on the telephone instead of talking or listening. These devices are also called TDD. When messages are typed on the keyboard, they are sent over the phone line to a receiving TTY. The message is shown on a monitor.  Use text messaging, social media, and email if it is hard for you to communicate by telephone.  Try to learn a listening technique called speechreading. It is not lipreading. You pay attention to people's gestures, expressions, posture, and tone of voice. These clues can help you understand what a person is saying. Face the person you are talking to, and have them face you. Make sure the lighting is good. You need to see the other person's face clearly.  Think about counseling if you need help to adjust to your hearing loss.  When should you call for help?  Watch closely for changes in your health, and be sure to contact your doctor if:    You think your hearing is getting worse.     You have new symptoms, such as dizziness or nausea.   Where can you learn more?  Go to https://www.[employer].net/patiented  Enter R798 in the search box to learn more about \"Hearing Loss: Care Instructions.\"  Current as of: March 1, 2023               Content Version: 13.7    4653-9288 [employer].   Care instructions adapted under license by your healthcare professional. If you have questions about a medical condition or this instruction, always ask your healthcare professional. [employer] disclaims any warranty or liability for your use of this information.         "

## 2023-09-25 ENCOUNTER — TELEPHONE (OUTPATIENT)
Dept: ONCOLOGY | Facility: HOSPITAL | Age: 68
End: 2023-09-25
Payer: COMMERCIAL

## 2023-09-28 ENCOUNTER — VIRTUAL VISIT (OUTPATIENT)
Dept: ONCOLOGY | Facility: HOSPITAL | Age: 68
End: 2023-09-28
Attending: INTERNAL MEDICINE
Payer: COMMERCIAL

## 2023-09-28 ENCOUNTER — TELEPHONE (OUTPATIENT)
Dept: ONCOLOGY | Facility: HOSPITAL | Age: 68
End: 2023-09-28

## 2023-09-28 VITALS — WEIGHT: 153 LBS | HEIGHT: 68 IN | BODY MASS INDEX: 23.19 KG/M2

## 2023-09-28 DIAGNOSIS — C50.412 MALIGNANT NEOPLASM OF UPPER-OUTER QUADRANT OF LEFT BREAST IN FEMALE, ESTROGEN RECEPTOR POSITIVE (H): Primary | ICD-10-CM

## 2023-09-28 DIAGNOSIS — Z79.811 LONG TERM (CURRENT) USE OF AROMATASE INHIBITORS: ICD-10-CM

## 2023-09-28 DIAGNOSIS — Z17.0 MALIGNANT NEOPLASM OF UPPER-OUTER QUADRANT OF LEFT BREAST IN FEMALE, ESTROGEN RECEPTOR POSITIVE (H): Primary | ICD-10-CM

## 2023-09-28 PROCEDURE — 99214 OFFICE O/P EST MOD 30 MIN: CPT | Mod: VID | Performed by: INTERNAL MEDICINE

## 2023-09-28 ASSESSMENT — PAIN SCALES - GENERAL: PAINLEVEL: NO PAIN (0)

## 2023-09-28 NOTE — PROGRESS NOTES
Virtual Visit Details  Joined the call at 9/28/2023, 10:32:31?am.  Left the call at 9/28/2023, 11:01:15?am.  You were on the call for 28 minutes 43 seconds .  Type of service:  Video Visit       Originating Location (pt. Location): Home    Distant Location (provider location):  Off-site  Platform used for Video Visit: [employer]

## 2023-09-28 NOTE — PROGRESS NOTES
[facility] Hematology and Oncology Consult Note    Patient: Kristin Almanzar  MRN: [id number]  Date of Service: Sep 28, 2023       Reason for consultation      Problem List Items Addressed This Visit          Other    Malignant neoplasm of upper-outer quadrant of left breast in female, estrogen receptor positive (H) - Primary    Long term (current) use of aromatase inhibitors          Assessment / number of problems addressed      1.  A very pleasant 67 year old woman with invasive ductal carcinoma left breast T2 N1 M0 ER/CO positive HER2/sloane negative status postlumpectomy.  Started on anastrozole in the middle of October 2022.  So far seems to be tolerating it well.  2.  Oncotype score of 16.  Normal bone density at the start.  3.  Good general health otherwise.  4.  Some concern regarding endocrine therapy.  5.  Some degree of anxiety.  6.  Slight vaginal dryness secondary to anastrozole.  She is also noticing some joint stiffness typical of aromatase inhibitor therapy.  7.  Enlarged thyroid gland.  She sees an endocrinologist for that.    Plan and medical decision making        Reviewed Labs and interpreted the results independently, Reviewed imaging results and Reviewed Notes from other providers. Ordered tests and medications as indicated.     1.  Anastrozole 1 mg p.o. daily.  She will continue that until October 2027.  We did discuss that there are some clinical trials looking at extended duration of aromatase inhibitor therapy.  So far none of them have shown survival advantage.  The best benefit that is not so far seen is decrease in the rate of contralateral breast cancer.  2.  Yearly mammogram.  3.  Breast exam once or twice a year.  4.  Continue with diet rich in calcium and vitamin D.  Continue to use some vaginal lubricating agents.  Continue with good diet and exercise.  Especially weight training.  5.  She is thinking about hip joint surgery.  I did ask her to make sure that symptoms are not  due to anastrozole therapy.  She can do that by taking a drug holiday.  6.  Follow-up in about 4 months timeframe.  7.  Next bone density in November 2024.      Clinical/pathological stage       Cancer Staging   No matching staging information was found for the patient.    History of present illness      Ms. Kristin Almanzar is a 67 year old  referred to us for evaluation of left-sided breast cancer diagnosed in July 2022 when she presented for mammogram in June 2022.  The mammogram was abnormal.  She had some additional studies and then a biopsy.  The biopsy confirmed invasive ductal carcinoma.  She underwent surgery on 27th July 2022.  She had lumpectomy with sentinel lymph node biopsy done.  She had 24 mm invasive ductal carcinoma removed.  [place] lymph nodes was positive  with a 6 mm deposit.  Focal extracapsular extension.  Margins were negative.    She also had an Oncotype score done.  The Oncotype score is 16.    Besides hypertension she has been very healthy person.  Dr. Obrien is her primary care doctor.    Finished radiation in October 2022.  Started on anastrozole after that.  So far seems to be tolerating it okay.  Did do a bone density before starting and it was normal.    Comes in today on virtual platform for her follow-up visit.  Overall seems to be doing okay.  Having some joint stiffness which is worse in the morning when she wakes up.  Gets better as she moves along.  Had a visit with Dr. Allen in August 2023 which went fine.  Breast exam was normal.  Her last mammogram was normal as well.  No significant change in her weight.  She is having some hip joint issues and is contemplating surgery down the road.    Review of system      Details noted in the history of present illness.  A detailed review of systems is otherwise negative.      Physical exam        LMP  (LMP Unknown)   GENERAL: no acute distress. Cooperative in conversation.   HEENT: Facial symmetry preserved.  Oral mucosa is moist and  intact.  NECK: No visible thyromegaly.  No deformity.  RESP: Regular respiratory rate. No stridor.  No coughing.  EXTREMITIES: No visible upper extremity edema.   NEURO: non focal. Alert and oriented x3.  Cranial nerves II through XI appear intact clinically.  PSYCH: within normal limits.   SKIN: Facial skin appears warm dry intact         Lab results Reviewed      No results found for this or any previous visit (from the past 168 hour(s)).    Imaging results Reviewed        No results found.    Joined the call at 9/28/2023, 10:32:31?am.  Left the call at 9/28/2023, 11:01:15?am.  You were on the call for 28 minutes 43 seconds .    Total time spent was over 35 minutes.  This includes chart prep time, review of clinical data including notes from outside physicians, labs, review of medical imaging, discussion about  plan of care, documentation and .      Signed by: Quincy Rajput MD      This note has been dictated using voice recognition software. Any grammatical or context distortions are unintentional and inherent to the software

## 2023-09-28 NOTE — NURSING NOTE
Is the patient currently in the state of [place]? YES    Visit mode:VIDEO    If the visit is dropped, the patient can be reconnected by: VIDEO VISIT: Send to e-mail at: [email or web address]    Will anyone else be joining the visit? NO  (If patient encounters technical issues they should call [phone]    How would you like to obtain your AVS? MyChart    Are changes needed to the allergy or medication list? No    Reason for visit: RECHECK    Jennifer Gonzalez VVF    Had to create another note for video notes as provider in notes.

## 2023-10-16 ENCOUNTER — PATIENT OUTREACH (OUTPATIENT)
Dept: GASTROENTEROLOGY | Facility: CLINIC | Age: 68
End: 2023-10-16
Payer: COMMERCIAL

## 2023-10-16 DIAGNOSIS — Z12.11 SPECIAL SCREENING FOR MALIGNANT NEOPLASMS, COLON: Primary | ICD-10-CM

## 2023-10-16 NOTE — PROGRESS NOTES
"Hx adenomatous polyps/Colectomy right-sided for colonic polyp dysplastic villous adenoma with high propensity to develop into colon cancer. Recalled in 5 years from 2017 colonoscopy.   [facility] Screening Colonoscopy Referral Review    Patient meets the inclusion criteria for screening colonoscopy standing order.    Ordering/Referring Provider:  Kaiden Pruitt    BMI: Estimated body mass index is 23.44 kg/m  as calculated from the following:    Height as of 9/28/23: 1.721 m (5' 7.75\").    Weight as of 9/28/23: 69.4 kg (153 lb).     Sedation:  Does patient have any of the following conditions affecting sedation?  No medical conditions affecting sedation.    Previous Scopes:  Any previous recommendations or follow up needs based on previous scope?  na / No recommendations.    Medical Concerns to Postpone Order:  Does patient have any of the following medical concerns that should postpone/delay colonoscopy referral?  No medical conditions affecting colonoscopy referral.    Final Referral Details:  Based on patient's medical history patient is appropriate for referral order with moderate sedation. If patient's BMI > 50 do not schedule in ASC.  "

## 2023-11-12 DIAGNOSIS — Z79.811 LONG TERM (CURRENT) USE OF AROMATASE INHIBITORS: ICD-10-CM

## 2023-11-12 DIAGNOSIS — C50.412 MALIGNANT NEOPLASM OF UPPER-OUTER QUADRANT OF LEFT BREAST IN FEMALE, ESTROGEN RECEPTOR POSITIVE (H): ICD-10-CM

## 2023-11-12 DIAGNOSIS — Z17.0 MALIGNANT NEOPLASM OF UPPER-OUTER QUADRANT OF LEFT BREAST IN FEMALE, ESTROGEN RECEPTOR POSITIVE (H): ICD-10-CM

## 2023-11-13 RX ORDER — ANASTROZOLE 1 MG/1
1 TABLET ORAL DAILY
Qty: 90 TABLET | Refills: 3 | Status: SHIPPED | OUTPATIENT
Start: 2023-11-13 | End: 2024-03-12

## 2023-12-18 ENCOUNTER — TELEPHONE (OUTPATIENT)
Dept: ONCOLOGY | Facility: HOSPITAL | Age: 68
End: 2023-12-18
Payer: COMMERCIAL

## 2024-01-08 ENCOUNTER — TELEPHONE (OUTPATIENT)
Dept: ONCOLOGY | Facility: HOSPITAL | Age: 69
End: 2024-01-08
Payer: COMMERCIAL

## 2024-01-29 ENCOUNTER — ONCOLOGY VISIT (OUTPATIENT)
Dept: ONCOLOGY | Facility: HOSPITAL | Age: 69
End: 2024-01-29
Attending: INTERNAL MEDICINE
Payer: COMMERCIAL

## 2024-01-29 ENCOUNTER — PATIENT OUTREACH (OUTPATIENT)
Dept: ONCOLOGY | Facility: HOSPITAL | Age: 69
End: 2024-01-29

## 2024-01-29 ENCOUNTER — LAB (OUTPATIENT)
Dept: INFUSION THERAPY | Facility: HOSPITAL | Age: 69
End: 2024-01-29
Attending: INTERNAL MEDICINE
Payer: COMMERCIAL

## 2024-01-29 VITALS
TEMPERATURE: 98.5 F | HEIGHT: 67 IN | SYSTOLIC BLOOD PRESSURE: 148 MMHG | BODY MASS INDEX: 25.16 KG/M2 | HEART RATE: 55 BPM | OXYGEN SATURATION: 98 % | WEIGHT: 160.3 LBS | DIASTOLIC BLOOD PRESSURE: 68 MMHG | RESPIRATION RATE: 18 BRPM

## 2024-01-29 DIAGNOSIS — C50.412 MALIGNANT NEOPLASM OF UPPER-OUTER QUADRANT OF LEFT BREAST IN FEMALE, ESTROGEN RECEPTOR POSITIVE (H): Primary | ICD-10-CM

## 2024-01-29 DIAGNOSIS — Z17.0 MALIGNANT NEOPLASM OF UPPER-OUTER QUADRANT OF LEFT BREAST IN FEMALE, ESTROGEN RECEPTOR POSITIVE (H): Primary | ICD-10-CM

## 2024-01-29 DIAGNOSIS — Z79.811 LONG TERM (CURRENT) USE OF AROMATASE INHIBITORS: ICD-10-CM

## 2024-01-29 DIAGNOSIS — C50.412 MALIGNANT NEOPLASM OF UPPER-OUTER QUADRANT OF LEFT BREAST IN FEMALE, ESTROGEN RECEPTOR POSITIVE (H): ICD-10-CM

## 2024-01-29 DIAGNOSIS — Z17.0 MALIGNANT NEOPLASM OF UPPER-OUTER QUADRANT OF LEFT BREAST IN FEMALE, ESTROGEN RECEPTOR POSITIVE (H): ICD-10-CM

## 2024-01-29 LAB
ALBUMIN SERPL BCG-MCNC: 4.4 G/DL (ref 3.5–5.2)
ALP SERPL-CCNC: 54 U/L (ref 40–150)
ALT SERPL W P-5'-P-CCNC: 15 U/L (ref 0–50)
ANION GAP SERPL CALCULATED.3IONS-SCNC: 6 MMOL/L (ref 7–15)
AST SERPL W P-5'-P-CCNC: 15 U/L (ref 0–45)
BILIRUB SERPL-MCNC: 0.3 MG/DL
BUN SERPL-MCNC: 15.7 MG/DL (ref 8–23)
CALCIUM SERPL-MCNC: 9.7 MG/DL (ref 8.8–10.2)
CHLORIDE SERPL-SCNC: 103 MMOL/L (ref 98–107)
CREAT SERPL-MCNC: 0.69 MG/DL (ref 0.51–0.95)
DEPRECATED HCO3 PLAS-SCNC: 33 MMOL/L (ref 22–29)
EGFRCR SERPLBLD CKD-EPI 2021: >90 ML/MIN/1.73M2
GLUCOSE SERPL-MCNC: 92 MG/DL (ref 70–99)
POTASSIUM SERPL-SCNC: 3.6 MMOL/L (ref 3.4–5.3)
PROT SERPL-MCNC: 6.7 G/DL (ref 6.4–8.3)
SODIUM SERPL-SCNC: 142 MMOL/L (ref 135–145)

## 2024-01-29 PROCEDURE — 80053 COMPREHEN METABOLIC PANEL: CPT

## 2024-01-29 PROCEDURE — 36415 COLL VENOUS BLD VENIPUNCTURE: CPT

## 2024-01-29 PROCEDURE — 99214 OFFICE O/P EST MOD 30 MIN: CPT | Performed by: INTERNAL MEDICINE

## 2024-01-29 PROCEDURE — G0463 HOSPITAL OUTPT CLINIC VISIT: HCPCS | Performed by: INTERNAL MEDICINE

## 2024-01-29 ASSESSMENT — PAIN SCALES - GENERAL: PAINLEVEL: MILD PAIN (3)

## 2024-01-29 NOTE — PROGRESS NOTES
"Oncology Rooming Note    January 29, 2024 10:16 AM   Kristin Almanzar is a 68 year old female who presents for:    Chief Complaint   Patient presents with    Oncology Clinic Visit     4 month return Malignant neoplasm of upper-outer quadrant of left breast in female, estrogen receptor positive, review labs      Initial Vitals: BP (!) 148/68 (BP Location: Left arm, Patient Position: Sitting, Cuff Size: Adult Regular)   Pulse 55   Temp 98.5  F (36.9  C) (Oral)   Resp 18   Ht 1.702 m (5' 7\")   Wt 72.7 kg (160 lb 4.8 oz)   LMP  (LMP Unknown)   SpO2 98%   BMI 25.11 kg/m   Estimated body mass index is 25.11 kg/m  as calculated from the following:    Height as of this encounter: 1.702 m (5' 7\").    Weight as of this encounter: 72.7 kg (160 lb 4.8 oz). Body surface area is 1.85 meters squared.  Mild Pain (3) Comment: Data Unavailable   No LMP recorded (lmp unknown). Patient is postmenopausal.  Allergies reviewed: Yes  Medications reviewed: Yes    Medications: Medication refills not needed today.  Pharmacy name entered into EPIC:    [facility]  [facility] [place] - [address] [place] [address] DRUG STORE #[id number] - [place], [place] - [address] [place] [address] E AT [place] [address] & [facility]    Frailty Screening:   Is the patient here for a new oncology consult visit in cancer care? 1. Yes. Over the past month, have you experienced difficulty or required a caregiver to assist with:   1. Balance, walking or general mobility (including any falls)? NO  2. Completion of self-care tasks such as bathing, dressing, toileting, grooming/hygiene?  NO  3. Concentration or memory that affects your daily life?  NO       Clinical concerns:  4 month return Malignant neoplasm of upper-outer quadrant of left breast in female, estrogen receptor positive, review labs       Hollie Yeung CMA            "

## 2024-01-29 NOTE — LETTER
"    1/29/2024         RE: Kristin Almanzar  [address] [place] [address]        Dear Colleague,    Thank you for referring your patient, Kristin Almanzar, to the [facility]. Please see a copy of my visit note below.    Oncology Rooming Note    January 29, 2024 10:16 AM   Kristin Almanzar is a 68 year old female who presents for:    Chief Complaint   Patient presents with     Oncology Clinic Visit     4 month return Malignant neoplasm of upper-outer quadrant of left breast in female, estrogen receptor positive, review labs      Initial Vitals: BP (!) 148/68 (BP Location: Left arm, Patient Position: Sitting, Cuff Size: Adult Regular)   Pulse 55   Temp 98.5  F (36.9  C) (Oral)   Resp 18   Ht 1.702 m (5' 7\")   Wt 72.7 kg (160 lb 4.8 oz)   LMP  (LMP Unknown)   SpO2 98%   BMI 25.11 kg/m   Estimated body mass index is 25.11 kg/m  as calculated from the following:    Height as of this encounter: 1.702 m (5' 7\").    Weight as of this encounter: 72.7 kg (160 lb 4.8 oz). Body surface area is 1.85 meters squared.  Mild Pain (3) Comment: Data Unavailable   No LMP recorded (lmp unknown). Patient is postmenopausal.  Allergies reviewed: Yes  Medications reviewed: Yes    Medications: Medication refills not needed today.  Pharmacy name entered into EPIC:    [facility]  FOR [address] [facility] STORE #[id number] - [place] [address] [address] AT [place] [address] & [facility]    Frailty Screening:   Is the patient here for a new oncology consult visit in cancer care? 1. Yes. Over the past month, have you experienced difficulty or required a caregiver to assist with:   1. Balance, walking or general mobility (including any falls)? NO  2. Completion of self-care tasks such as bathing, dressing, toileting, grooming/hygiene?  NO  3. Concentration or memory that affects your daily life?  NO       Clinical concerns:  4 month return " Malignant neoplasm of upper-outer quadrant of left breast in female, estrogen receptor positive, review labs       Hollie Yeung [facility] Hematology and Oncology Consult Note    Patient: Kristin Almanzar  MRN: [id number]  Date of Service: Jan 29, 2024       Reason for consultation      Problem List Items Addressed This Visit          Other    Malignant neoplasm of upper-outer quadrant of left breast in female, estrogen receptor positive (H) - Primary    Long term (current) use of aromatase inhibitors          Assessment / number of problems addressed      1.  A very pleasant 68 year old woman with invasive ductal carcinoma left breast T2 N1 M0 ER/NJ positive HER2/sloane negative status postlumpectomy.  Started on anastrozole in the middle of October 2022.  So far seems to be tolerating it well.  2.  Oncotype score of 16.  Normal bone density in November 2022.  3.  Good general health otherwise.  4.  Some concern regarding endocrine therapy.  5.  Some degree of anxiety.  6.  Slight vaginal dryness secondary to anastrozole.  She is also noticing some joint stiffness typical of aromatase inhibitor therapy.  7.  Enlarged thyroid gland.  She sees an endocrinologist for that.    Plan and medical decision making      Presenting with some side effects of treatment. Reviewed notes from each unique source.  Reviewed each unique test.  Ordered tests. Independently interpreted the results of lab tests and radiological exams.      1.  Treatment with anastrozole 1 mg p.o. daily.  She will continue that until October 2027.  2.  Yearly mammogram.  Next mammogram will be due I believe in July of this year.  3.  Bone density in November 2024.  4.  Diet rich in calcium and vitamin D.  5.  Continue with good diet and exercise.  Observe pandemic precautions.  6.  Follow-up with regular doctor.  7.  Counseled patient about management of vaginal dryness.    Clinical/pathological stage       Cancer Staging   No  "matching staging information was found for the patient.      History of present illness      Ms. Kristin Almanzar is a 68 year old  referred to us for evaluation of left-sided breast cancer diagnosed in July 2022 when she presented for mammogram in June 2022.  The mammogram was abnormal.  She had some additional studies and then a biopsy.  The biopsy confirmed invasive ductal carcinoma.  She underwent surgery on 27th July 2022.  She had lumpectomy with sentinel lymph node biopsy done.  She had 24 mm invasive ductal carcinoma removed.  [place] lymph nodes was positive  with a 6 mm deposit.  Focal extracapsular extension.  Margins were negative.    She also had an Oncotype score done.  The Oncotype score is 16.    Besides hypertension she has been very healthy person.  Dr. Obrien is her primary care doctor.    Finished radiation in October 2022.  Started on anastrozole after that.  So far seems to be tolerating it okay.  Did do a bone density before starting and it was normal.    Had a visit with Dr. Allen in August 2023 which went fine.  Breast exam was normal.  Her last mammogram was normal as well.  No significant change in her weight.  She is having some hip joint issues and is contemplating surgery down the road.    Comes in today for scheduled follow-up.  Overall seems to be doing okay.  Has not had any other medical visits since her last visit here. Having some issues with vaginal dryness.    Review of system      Details noted in the history of present illness.  A detailed review of systems is otherwise negative.      Physical exam        BP (!) 148/68 (BP Location: Left arm, Patient Position: Sitting, Cuff Size: Adult Regular)   Pulse 55   Temp 98.5  F (36.9  C) (Oral)   Resp 18   Ht 1.702 m (5' 7\")   Wt 72.7 kg (160 lb 4.8 oz)   LMP  (LMP Unknown)   SpO2 98%   BMI 25.11 kg/m      GENERAL: No acute distress. Cooperative in conversation.   HEENT:  Pupils are equal, round and reactive. Oral mucosa is " clean and intact. No ulcerations or mucositis noted. No bleeding noted.  RESP:Chest symmetric lungs are clear bilaterally per auscultation. Regular respiratory rate. No wheezes or rhonchi.  CV: Normal S1 S2 Regular, rate and rhythm.     ABD: Nondistended, soft, nontender. Positive bowel sounds. No organomegaly.   EXTREMITIES: No lower extremity edema.   NEURO: Non- focal. Alert and oriented x3.  Cranial nerves appear intact.  PSYCH: Within normal limits. No depression or anxiety.  SKIN: Warm dry intact.      Lab results Reviewed      Recent Results (from the past 168 hour(s))   Comprehensive metabolic panel   Result Value Ref Range    Sodium 142 135 - 145 mmol/L    Potassium 3.6 3.4 - 5.3 mmol/L    Carbon Dioxide (CO2) 33 (H) 22 - 29 mmol/L    Anion Gap 6 (L) 7 - 15 mmol/L    Urea Nitrogen 15.7 8.0 - 23.0 mg/dL    Creatinine 0.69 0.51 - 0.95 mg/dL    GFR Estimate >90 >60 mL/min/1.73m2    Calcium 9.7 8.8 - 10.2 mg/dL    Chloride 103 98 - 107 mmol/L    Glucose 92 70 - 99 mg/dL    Alkaline Phosphatase 54 40 - 150 U/L    AST 15 0 - 45 U/L    ALT 15 0 - 50 U/L    Protein Total 6.7 6.4 - 8.3 g/dL    Albumin 4.4 3.5 - 5.2 g/dL    Bilirubin Total 0.3 <=1.2 mg/dL       Imaging results Reviewed        No results found.      Signed by: Quincy Rajput MD      This note has been dictated using voice recognition software. Any grammatical or context distortions are unintentional and inherent to the software      Again, thank you for allowing me to participate in the care of your patient.        Sincerely,        Quincy Rajput MD

## 2024-01-29 NOTE — PROGRESS NOTES
[facility] Hematology and Oncology Consult Note    Patient: Kristin Almanzar  MRN: [id number]  Date of Service: Jan 29, 2024       Reason for consultation      Problem List Items Addressed This Visit          Other    Malignant neoplasm of upper-outer quadrant of left breast in female, estrogen receptor positive (H) - Primary    Long term (current) use of aromatase inhibitors          Assessment / number of problems addressed      1.  A very pleasant 68 year old woman with invasive ductal carcinoma left breast T2 N1 M0 ER/OR positive HER2/sloane negative status postlumpectomy.  Started on anastrozole in the middle of October 2022.  So far seems to be tolerating it well.  2.  Oncotype score of 16.  Normal bone density in November 2022.  3.  Good general health otherwise.  4.  Some concern regarding endocrine therapy.  5.  Some degree of anxiety.  6.  Slight vaginal dryness secondary to anastrozole.  She is also noticing some joint stiffness typical of aromatase inhibitor therapy.  7.  Enlarged thyroid gland.  She sees an endocrinologist for that.    Plan and medical decision making      Presenting with some side effects of treatment. Reviewed notes from each unique source.  Reviewed each unique test.  Ordered tests. Independently interpreted the results of lab tests and radiological exams.      1.  Treatment with anastrozole 1 mg p.o. daily.  She will continue that until October 2027.  2.  Yearly mammogram.  Next mammogram will be due I believe in July of this year.  3.  Bone density in November 2024.  4.  Diet rich in calcium and vitamin D.  5.  Continue with good diet and exercise.  Observe pandemic precautions.  6.  Follow-up with regular doctor.  7.  Counseled patient about management of vaginal dryness.    Clinical/pathological stage       Cancer Staging   No matching staging information was found for the patient.      History of present illness      Ms. Kristin Almanzar is a 68 year old  referred to us for  "evaluation of left-sided breast cancer diagnosed in July 2022 when she presented for mammogram in June 2022.  The mammogram was abnormal.  She had some additional studies and then a biopsy.  The biopsy confirmed invasive ductal carcinoma.  She underwent surgery on 27th July 2022.  She had lumpectomy with sentinel lymph node biopsy done.  She had 24 mm invasive ductal carcinoma removed.  [place] lymph nodes was positive  with a 6 mm deposit.  Focal extracapsular extension.  Margins were negative.    She also had an Oncotype score done.  The Oncotype score is 16.    Besides hypertension she has been very healthy person.  Dr. Obrien is her primary care doctor.    Finished radiation in October 2022.  Started on anastrozole after that.  So far seems to be tolerating it okay.  Did do a bone density before starting and it was normal.    Had a visit with Dr. Allen in August 2023 which went fine.  Breast exam was normal.  Her last mammogram was normal as well.  No significant change in her weight.  She is having some hip joint issues and is contemplating surgery down the road.    Comes in today for scheduled follow-up.  Overall seems to be doing okay.  Has not had any other medical visits since her last visit here. Having some issues with vaginal dryness.    Review of system      Details noted in the history of present illness.  A detailed review of systems is otherwise negative.      Physical exam        BP (!) 148/68 (BP Location: Left arm, Patient Position: Sitting, Cuff Size: Adult Regular)   Pulse 55   Temp 98.5  F (36.9  C) (Oral)   Resp 18   Ht 1.702 m (5' 7\")   Wt 72.7 kg (160 lb 4.8 oz)   LMP  (LMP Unknown)   SpO2 98%   BMI 25.11 kg/m      GENERAL: No acute distress. Cooperative in conversation.   HEENT:  Pupils are equal, round and reactive. Oral mucosa is clean and intact. No ulcerations or mucositis noted. No bleeding noted.  RESP:Chest symmetric lungs are clear bilaterally per auscultation. Regular " respiratory rate. No wheezes or rhonchi.  CV: Normal S1 S2 Regular, rate and rhythm.     ABD: Nondistended, soft, nontender. Positive bowel sounds. No organomegaly.   EXTREMITIES: No lower extremity edema.   NEURO: Non- focal. Alert and oriented x3.  Cranial nerves appear intact.  PSYCH: Within normal limits. No depression or anxiety.  SKIN: Warm dry intact.      Lab results Reviewed      Recent Results (from the past 168 hour(s))   Comprehensive metabolic panel   Result Value Ref Range    Sodium 142 135 - 145 mmol/L    Potassium 3.6 3.4 - 5.3 mmol/L    Carbon Dioxide (CO2) 33 (H) 22 - 29 mmol/L    Anion Gap 6 (L) 7 - 15 mmol/L    Urea Nitrogen 15.7 8.0 - 23.0 mg/dL    Creatinine 0.69 0.51 - 0.95 mg/dL    GFR Estimate >90 >60 mL/min/1.73m2    Calcium 9.7 8.8 - 10.2 mg/dL    Chloride 103 98 - 107 mmol/L    Glucose 92 70 - 99 mg/dL    Alkaline Phosphatase 54 40 - 150 U/L    AST 15 0 - 45 U/L    ALT 15 0 - 50 U/L    Protein Total 6.7 6.4 - 8.3 g/dL    Albumin 4.4 3.5 - 5.2 g/dL    Bilirubin Total 0.3 <=1.2 mg/dL       Imaging results Reviewed        No results found.      Signed by: Quincy Rajput MD      This note has been dictated using voice recognition software. Any grammatical or context distortions are unintentional and inherent to the software

## 2024-02-20 NOTE — PROGRESS NOTES
[facility]: Cancer Care                                                                                          Patient was seen in the clinic in follow-up for her breast cancer diagnosis.  She should continue taking anastrozole 1 mg daily.  She should follow-up in about 4 months.    Signature:  Suzan Bermudez RN

## 2024-03-11 ENCOUNTER — MYC REFILL (OUTPATIENT)
Dept: INTERNAL MEDICINE | Facility: CLINIC | Age: 69
End: 2024-03-11
Payer: COMMERCIAL

## 2024-03-11 DIAGNOSIS — I10 ESSENTIAL HYPERTENSION: ICD-10-CM

## 2024-03-12 ENCOUNTER — MYC REFILL (OUTPATIENT)
Dept: ONCOLOGY | Facility: HOSPITAL | Age: 69
End: 2024-03-12
Payer: COMMERCIAL

## 2024-03-12 ENCOUNTER — MYC REFILL (OUTPATIENT)
Dept: INTERNAL MEDICINE | Facility: CLINIC | Age: 69
End: 2024-03-12
Payer: COMMERCIAL

## 2024-03-12 DIAGNOSIS — C50.412 MALIGNANT NEOPLASM OF UPPER-OUTER QUADRANT OF LEFT BREAST IN FEMALE, ESTROGEN RECEPTOR POSITIVE (H): ICD-10-CM

## 2024-03-12 DIAGNOSIS — I10 ESSENTIAL HYPERTENSION: ICD-10-CM

## 2024-03-12 DIAGNOSIS — Z79.811 LONG TERM (CURRENT) USE OF AROMATASE INHIBITORS: ICD-10-CM

## 2024-03-12 DIAGNOSIS — Z17.0 MALIGNANT NEOPLASM OF UPPER-OUTER QUADRANT OF LEFT BREAST IN FEMALE, ESTROGEN RECEPTOR POSITIVE (H): ICD-10-CM

## 2024-03-12 RX ORDER — ATENOLOL AND CHLORTHALIDONE TABLET 50; 25 MG/1; MG/1
1 TABLET ORAL DAILY
Qty: 90 TABLET | Refills: 2 | Status: SHIPPED | OUTPATIENT
Start: 2024-03-12

## 2024-03-12 RX ORDER — LISINOPRIL 10 MG/1
10 TABLET ORAL DAILY
Qty: 90 TABLET | Refills: 11 | Status: SHIPPED | OUTPATIENT
Start: 2024-03-12

## 2024-03-12 RX ORDER — POTASSIUM CHLORIDE 1500 MG/1
20 TABLET, EXTENDED RELEASE ORAL DAILY
Qty: 90 TABLET | Refills: 1 | Status: SHIPPED | OUTPATIENT
Start: 2024-03-12

## 2024-03-13 RX ORDER — ANASTROZOLE 1 MG/1
1 TABLET ORAL DAILY
Qty: 90 TABLET | Refills: 0 | Status: SHIPPED | OUTPATIENT
Start: 2024-03-13

## 2024-04-04 ENCOUNTER — TRANSFERRED RECORDS (OUTPATIENT)
Dept: HEALTH INFORMATION MANAGEMENT | Facility: CLINIC | Age: 69
End: 2024-04-04
Payer: COMMERCIAL

## 2024-05-28 ENCOUNTER — OFFICE VISIT (OUTPATIENT)
Dept: INTERNAL MEDICINE | Facility: CLINIC | Age: 69
End: 2024-05-28
Payer: COMMERCIAL

## 2024-05-28 VITALS
OXYGEN SATURATION: 100 % | RESPIRATION RATE: 16 BRPM | SYSTOLIC BLOOD PRESSURE: 132 MMHG | DIASTOLIC BLOOD PRESSURE: 82 MMHG | BODY MASS INDEX: 25.46 KG/M2 | HEART RATE: 56 BPM | WEIGHT: 162.2 LBS | TEMPERATURE: 98.2 F | HEIGHT: 67 IN

## 2024-05-28 DIAGNOSIS — E04.9 GOITER: ICD-10-CM

## 2024-05-28 DIAGNOSIS — Z11.59 NEED FOR HEPATITIS C SCREENING TEST: ICD-10-CM

## 2024-05-28 DIAGNOSIS — Z29.11 NEED FOR VACCINATION AGAINST RESPIRATORY SYNCYTIAL VIRUS: ICD-10-CM

## 2024-05-28 DIAGNOSIS — Z23 NEED FOR SHINGLES VACCINE: ICD-10-CM

## 2024-05-28 DIAGNOSIS — Z01.818 PRE-OP EXAM: Primary | ICD-10-CM

## 2024-05-28 LAB
ALBUMIN SERPL BCG-MCNC: 4.5 G/DL (ref 3.5–5.2)
ALBUMIN UR-MCNC: NEGATIVE MG/DL
ALP SERPL-CCNC: 51 U/L (ref 40–150)
ALT SERPL W P-5'-P-CCNC: 16 U/L (ref 0–50)
ANION GAP SERPL CALCULATED.3IONS-SCNC: 9 MMOL/L (ref 7–15)
APPEARANCE UR: CLEAR
AST SERPL W P-5'-P-CCNC: 34 U/L (ref 0–45)
BILIRUB SERPL-MCNC: 0.3 MG/DL
BILIRUB UR QL STRIP: NEGATIVE
BUN SERPL-MCNC: 11.6 MG/DL (ref 8–23)
CALCIUM SERPL-MCNC: 9.7 MG/DL (ref 8.8–10.2)
CHLORIDE SERPL-SCNC: 97 MMOL/L (ref 98–107)
COLOR UR AUTO: YELLOW
CREAT SERPL-MCNC: 0.58 MG/DL (ref 0.51–0.95)
DEPRECATED HCO3 PLAS-SCNC: 28 MMOL/L (ref 22–29)
EGFRCR SERPLBLD CKD-EPI 2021: >90 ML/MIN/1.73M2
ERYTHROCYTE [DISTWIDTH] IN BLOOD BY AUTOMATED COUNT: 12.5 % (ref 10–15)
GLUCOSE SERPL-MCNC: 109 MG/DL (ref 70–99)
GLUCOSE UR STRIP-MCNC: NEGATIVE MG/DL
HCT VFR BLD AUTO: 38 % (ref 35–47)
HGB BLD-MCNC: 13.1 G/DL (ref 11.7–15.7)
HGB UR QL STRIP: NEGATIVE
KETONES UR STRIP-MCNC: NEGATIVE MG/DL
LEUKOCYTE ESTERASE UR QL STRIP: NEGATIVE
MCH RBC QN AUTO: 31.8 PG (ref 26.5–33)
MCHC RBC AUTO-ENTMCNC: 34.5 G/DL (ref 31.5–36.5)
MCV RBC AUTO: 92 FL (ref 78–100)
NITRATE UR QL: NEGATIVE
PH UR STRIP: 7 [PH] (ref 5–8)
PLATELET # BLD AUTO: 255 10E3/UL (ref 150–450)
POTASSIUM SERPL-SCNC: 4 MMOL/L (ref 3.4–5.3)
PROT SERPL-MCNC: 7 G/DL (ref 6.4–8.3)
RBC # BLD AUTO: 4.12 10E6/UL (ref 3.8–5.2)
SODIUM SERPL-SCNC: 134 MMOL/L (ref 135–145)
SP GR UR STRIP: 1.01 (ref 1–1.03)
TSH SERPL DL<=0.005 MIU/L-ACNC: 0.7 UIU/ML (ref 0.3–4.2)
UROBILINOGEN UR STRIP-ACNC: 0.2 E.U./DL
WBC # BLD AUTO: 5 10E3/UL (ref 4–11)

## 2024-05-28 PROCEDURE — 36415 COLL VENOUS BLD VENIPUNCTURE: CPT | Performed by: INTERNAL MEDICINE

## 2024-05-28 PROCEDURE — 81003 URINALYSIS AUTO W/O SCOPE: CPT | Performed by: INTERNAL MEDICINE

## 2024-05-28 PROCEDURE — 80053 COMPREHEN METABOLIC PANEL: CPT | Performed by: INTERNAL MEDICINE

## 2024-05-28 PROCEDURE — 85027 COMPLETE CBC AUTOMATED: CPT | Performed by: INTERNAL MEDICINE

## 2024-05-28 PROCEDURE — 99214 OFFICE O/P EST MOD 30 MIN: CPT | Performed by: INTERNAL MEDICINE

## 2024-05-28 PROCEDURE — 84443 ASSAY THYROID STIM HORMONE: CPT | Performed by: INTERNAL MEDICINE

## 2024-05-28 RX ORDER — RESPIRATORY SYNCYTIAL VIRUS VACCINE 120MCG/0.5
0.5 KIT INTRAMUSCULAR ONCE
Qty: 1 EACH | Refills: 0 | Status: CANCELLED | OUTPATIENT
Start: 2024-05-28 | End: 2024-05-28

## 2024-05-28 NOTE — PROGRESS NOTES
Pre-Op Note:  Today's date: May 28, 2024    Kristin Almanzar is a 68 year old female who presents for a preoperative evaluation.    Surgical Information:  Surgery/Procedure: Preoperative examination in anticipation of right total hip arthroplasty 2/13/2024 with Dr. Yang of [facility] orthopedic group at [facility].  Surgery Location: As above  Surgeon: Dr. CARMONA of [facility] orthopedic group  Surgery Date: June 13, 2024  Fax number for surgical facility: [phone]    Subjective:   Bone-on-bone osteoarthritis for this 68-year-old female right hip inhibits walking needs total hip arthroplasty.  Severe pain at times has failed conservative measures.  No personal history of anesthetic complications related to general anesthesia.    ROS:   14 point negative    Medications:     Current Outpatient Medications:     acetaminophen (TYLENOL) 500 MG tablet, Take 500-1,000 mg by mouth as needed for mild pain, Disp: , Rfl:     anastrozole (ARIMIDEX) 1 MG tablet, Take 1 tablet (1 mg) by mouth daily, Disp: 90 tablet, Rfl: 0    atenolol-chlorthalidone (TENORETIC) 50-25 MG tablet, Take 1 tablet by mouth daily, Disp: 90 tablet, Rfl: 2    lisinopril (ZESTRIL) 10 MG tablet, Take 1 tablet (10 mg) by mouth daily, Disp: 90 tablet, Rfl: 11    potassium chloride ren ER (KLOR-CON M20) 20 MEQ CR tablet, Take 1 tablet (20 mEq) by mouth daily, Disp: 90 tablet, Rfl: 1     Allergies:  Allergies   Allergen Reactions    Amoxicillin Rash    Propoxyphene Rash     Red blotches on face       Immunizations:   Immunization History   Administered Date(s) Administered    COVID-19 MONOVALENT 12+ (Pfizer) 03/09/2021, 03/09/2021, 04/06/2021, 10/26/2021    COVID-19 Monovalent 12+ (Pfizer 2022) 05/09/2022    DT (PEDS <7y) 06/30/1999    Flu, Unspecified 10/26/2020, 11/11/2021    Influenza Vaccine 65+ (FLUAD) 11/07/2023    Influenza Vaccine 65+ (Fluzone HD) 10/26/2020, 11/11/2021, 11/16/2022    Influenza Vaccine >6 months,quad, PF 11/14/2019     Influenza, seasonal, injectable, PF 10/31/2014, 11/06/2015, 12/07/2016    Influenza,INJ,MDCK,PF,Quad >6mo(Flucelvax) 11/11/2017    TDAP (Adacel,Boostrix) 10/14/2010, 11/27/2020    Td,adult,historic,unspecified 06/30/1999    Twinrix A/B 04/30/2024    Zoster recombinant adjuvanted (SHINGRIX) 03/11/2024     Immunizations reviewed and up-to-date.    Health Maintenance:   Immunizations vaccines reviewed.  Recent colonoscopy allCLEAR.    Past Medical History:   Past Medical History:   Diagnosis Date    Arthritis last summer    right hip only    Cancer (H) June 2022    History of anesthesia complications     vinethane - slow to wake up    Hypertension     PONV (postoperative nausea and vomiting)     as a child had vinethane slow to awake   Hypertension without target organ damage related to same.    History of multinodular goiter TSH level pending previous biopsies of same for size benign no cancer.  Unchanged no Erisman of respiratory status.  No stridor.    Non-smoker    No excess alcohol.  Allergies include amoxicillin and propoxyphene.    Past Surgical History:   Past Surgical History:   Procedure Laterality Date    ABDOMEN SURGERY  August 2013    colectomy-rt. roxie    APPENDECTOMY      BIOPSY      COLON SURGERY Right     Colectomy - Large polyp    COLONOSCOPY      COLPORRHAPHY N/A 06/27/2017    Procedure:  UTEROSACRAL LIGAMENT SUSPENSION CYSTOSCOPY;  Surgeon: Nancy Girard MD;  Location: [facility];  Service:     EYE SURGERY Left     Cataract    HYSTERECTOMY  06/27/2017    HYSTERECTOMY VAGINAL N/A 06/27/2017    Procedure: VAGINAL HYSTERECTOMY;  Surgeon: Nancy Girard MD;  Location: [facility];  Service:     LUMPECTOMY BREAST Left 07/27/2022    Procedure: Left Lumpectpmy; [place] Lymph Node Biopsy;  Surgeon: Anastasia Allen MD;  [facility]: [facility]   Colectomy for polyp premalignant right hemicolectomy.  With ileocolonic anastomosis.  Dr. [facility] colorectal surgery [facility] now  "retired.    Lumpectomy left side  no sign of recurrence of breast cancer.  After lumpectomy and XRT now on Arimidex 1 mg daily for 5 years.  Followed by oncology.    Hysterectomy for benign disease and prior cataract surgeries.  Complications except in her youth she did have trouble awakening from what sounds like a barbiturate anesthetic complication.        Family History:   Family History   Problem Relation Age of Onset    Colon Cancer Father 78    Breast Cancer Sister 41    Cancer Brother 55        Prostate    Hypertension Mother        Mother  bleed in her brain age 86.  She had hypertension.    Father  81 colon cancer.  2 children well 2 grandchildren well ages 4 to she cares for them when the parents ask.  Exam:  Vitals:/82 (BP Location: Right arm, Patient Position: Sitting, Cuff Size: Adult Regular)   Pulse 56   Temp 98.2  F (36.8  C) (Oral)   Resp 16   Ht 1.702 m (5' 7\")   Wt 73.6 kg (162 lb 3.2 oz)   LMP  (LMP Unknown)   SpO2 100%   BMI 25.40 kg/m    Easily conversant good spirited intelligent not in acute distress or toxic appearing she appears well younger than stated age.    Neck veins are not nondistended there is no carotid bruit or the thyroid was not enlarged approximately 50 g asymmetric to the right no nodules could be appreciated.  Previous thyroid biopsies have been benign and TSH levels have been normal the nodular euthyroid goiter.    Chest is clear heart tones normal abdomen benign extremities free of edema neck veins nondistended she appeared well skin was warm and dry fair complexion is not in acute distress or toxic she is ready to proceed for right total hip arthroplasty as outlined above.    Assessment and Plan:  Medically acceptable risk for anticipated right total hip arthroplasty 2024.  She personally has had no problems with general anesthesia and there is no family history of malignant hyperthermia associated with general anesthesia for her.  Today " check hemogram comprehensive metabolic profile urinalysis and TSH level.  We had a good discussion.    Kaiden Pruitt MD    Internal Medicine    The longitudinal plan of care for the diagnosis(es)/condition(s) as documented were addressed during this visit. Due to the added complexity in care, I will continue to support Kristin in the subsequent management and with ongoing continuity of care.

## 2024-05-28 NOTE — PROGRESS NOTES
Preoperative Evaluation  [address] [address]  Phone: [phone]  Fax: [phone]  Primary Provider: Kaiden Pruitt MD  Pre-op Performing Provider: Kaiden Pruitt MD  May 28, 2024   {Provider  Link to PREOP SmartSet  REQUIRED to apply standard patient instructions and medication directions to the AVS :532954}  {ROOMER review and update patient entered surgical information if needed :776812}        5/27/2024   Surgical Information   What procedure is being done? Total hip replacement surgery/right hip   Facility or Hospital where procedure/surgery will be performed: [facility]   Who is doing the procedure / surgery? Dr. Padilla   Date of surgery / procedure: 6/13/2024/right total hip replacement surgery   Time of surgery / procedure: afternoon/right total hip replacement surgery   Where do you plan to recover after surgery? at home with family     Fax number for surgical facility: [phone]    {Provider Charting Preference for Preop :891663}    Abel Foster is a 68 year old, presenting for the following:  Pre-Op Exam          5/28/2024    10:53 AM   Additional Questions   Roomed by BLADIMIR Rodriguez related to upcoming procedure: ***        5/27/2024   Pre-Op Questionnaire   Have you ever had a heart attack or stroke? No   Have you ever had surgery on your heart or blood vessels, such as a stent placement, a coronary artery bypass, or surgery on an artery in your head, neck, heart, or legs? No   Do you have chest pain with activity? No   Do you have a history of heart failure? No   Do you currently have a cold, bronchitis or symptoms of other infection? No   Do you have a cough, shortness of breath, or wheezing? No   Do you or anyone in your family have previous history of blood clots? No   Do you or does anyone in your family have a serious bleeding problem such as prolonged  bleeding following surgeries or cuts? No   Have you ever had problems with anemia or been told to take iron pills? No   Have you had any abnormal blood loss such as black, tarry or bloody stools, or abnormal vaginal bleeding? No   Have you ever had a blood transfusion? No   Are you willing to have a blood transfusion if it is medically needed before, during, or after your surgery? Yes   Have you or any of your relatives ever had problems with anesthesia? (!) YES ***   Do you have sleep apnea, excessive snoring or daytime drowsiness? No   Do you have any artifical heart valves or other implanted medical devices like a pacemaker, defibrillator, or continuous glucose monitor? No   Do you have artificial joints? No   Are you allergic to latex? No     Health Care Directive  Patient does not have a Health Care Directive or Living Will: {ADVANCE_DIRECTIVE_STATUS:505866}    Preoperative Review of   {Mnpmpreport:312291}  {Review MNPMP for all patients per [facility]CSI MNPMP Profile:004568}    {Chronic problem details (Optional) :079485}    Patient Active Problem List    Diagnosis Date Noted    Long term (current) use of aromatase inhibitors  09/28/2023     Priority: Medium    Malignant neoplasm of upper-outer quadrant of left breast in female, estrogen receptor positive (H) 07/20/2022     Priority: Medium    Adenomatous Goiter      Priority: Medium     Created by Conversion  Replacement Utility updated for latest IMO load        Essential Hypertension      Priority: Medium     Created by Conversion  Replacement Utility updated for latest IMO load        Polyposis Coli Of The Large Intestine      Priority: Medium     Created by Conversion        Cataract      Priority: Medium     Created by Conversion        Erythema Nodosum      Priority: Medium     Created by Conversion        Functional Murmur      Priority: Medium     Created by Conversion          Past Medical History:   Diagnosis Date    Arthritis last summer    right hip  only    Cancer (H) June 2022    History of anesthesia complications     vinethane - slow to wake up    Hypertension     PONV (postoperative nausea and vomiting)     as a child had vinethane slow to awake     Past Surgical History:   Procedure Laterality Date    ABDOMEN SURGERY  August 2013    colectomy-rt. roxie    APPENDECTOMY      BIOPSY      COLON SURGERY Right     Colectomy - Large polyp    COLONOSCOPY      COLPORRHAPHY N/A 06/27/2017    Procedure:  UTEROSACRAL LIGAMENT SUSPENSION CYSTOSCOPY;  Surgeon: Nancy Girard MD;  Location: [facility];  Service:     EYE SURGERY Left     Cataract    HYSTERECTOMY  06/27/2017    HYSTERECTOMY VAGINAL N/A 06/27/2017    Procedure: VAGINAL HYSTERECTOMY;  Surgeon: Nancy Girard MD;  Location: [facility];  Service:     LUMPECTOMY BREAST Left 07/27/2022    Procedure: Left Lumpectpmy; [place] Lymph Node Biopsy;  Surgeon: Anastasia Allen MD;  Location: [facility]     Current Outpatient Medications   Medication Sig Dispense Refill    acetaminophen (TYLENOL) 500 MG tablet Take 500-1,000 mg by mouth as needed for mild pain      anastrozole (ARIMIDEX) 1 MG tablet Take 1 tablet (1 mg) by mouth daily 90 tablet 0    atenolol-chlorthalidone (TENORETIC) 50-25 MG tablet Take 1 tablet by mouth daily 90 tablet 2    lisinopril (ZESTRIL) 10 MG tablet Take 1 tablet (10 mg) by mouth daily 90 tablet 11    potassium chloride ren ER (KLOR-CON M20) 20 MEQ CR tablet Take 1 tablet (20 mEq) by mouth daily 90 tablet 1       Allergies   Allergen Reactions    Amoxicillin Rash    Propoxyphene Rash     Red blotches on face        Social History     Tobacco Use    Smoking status: Never     Passive exposure: Never    Smokeless tobacco: Never   Substance Use Topics    Alcohol use: Not Currently     Comment: Alcoholic Drinks/day: rare     {FAMILY HISTORY (Optional):440037311}  History   Drug Use No           {ROS Picklists (Optional):891159}    Objective    /82 (BP Location:  "Right arm, Patient Position: Sitting, Cuff Size: Adult Regular)   Pulse 56   Temp 98.2  F (36.8  C) (Oral)   Resp 16   Ht 1.702 m (5' 7\")   Wt 73.6 kg (162 lb 3.2 oz)   LMP  (LMP Unknown)   SpO2 100%   BMI 25.40 kg/m     Estimated body mass index is 25.4 kg/m  as calculated from the following:    Height as of this encounter: 1.702 m (5' 7\").    Weight as of this encounter: 73.6 kg (162 lb 3.2 oz).  "

## 2024-06-03 ENCOUNTER — PATIENT OUTREACH (OUTPATIENT)
Dept: ONCOLOGY | Facility: HOSPITAL | Age: 69
End: 2024-06-03

## 2024-06-03 ENCOUNTER — PATIENT OUTREACH (OUTPATIENT)
Dept: CARE COORDINATION | Facility: CLINIC | Age: 69
End: 2024-06-03

## 2024-06-03 ENCOUNTER — ONCOLOGY VISIT (OUTPATIENT)
Dept: ONCOLOGY | Facility: HOSPITAL | Age: 69
End: 2024-06-03
Attending: INTERNAL MEDICINE
Payer: COMMERCIAL

## 2024-06-03 ENCOUNTER — LAB (OUTPATIENT)
Dept: INFUSION THERAPY | Facility: HOSPITAL | Age: 69
End: 2024-06-03
Attending: INTERNAL MEDICINE
Payer: COMMERCIAL

## 2024-06-03 VITALS
HEIGHT: 67 IN | TEMPERATURE: 98.2 F | BODY MASS INDEX: 25.08 KG/M2 | WEIGHT: 159.8 LBS | DIASTOLIC BLOOD PRESSURE: 68 MMHG | OXYGEN SATURATION: 99 % | SYSTOLIC BLOOD PRESSURE: 147 MMHG | HEART RATE: 60 BPM | RESPIRATION RATE: 16 BRPM

## 2024-06-03 DIAGNOSIS — Z17.0 MALIGNANT NEOPLASM OF UPPER-OUTER QUADRANT OF LEFT BREAST IN FEMALE, ESTROGEN RECEPTOR POSITIVE (H): ICD-10-CM

## 2024-06-03 DIAGNOSIS — C50.412 MALIGNANT NEOPLASM OF UPPER-OUTER QUADRANT OF LEFT BREAST IN FEMALE, ESTROGEN RECEPTOR POSITIVE (H): Primary | ICD-10-CM

## 2024-06-03 DIAGNOSIS — Z79.811 LONG TERM (CURRENT) USE OF AROMATASE INHIBITORS: ICD-10-CM

## 2024-06-03 DIAGNOSIS — Z17.0 MALIGNANT NEOPLASM OF UPPER-OUTER QUADRANT OF LEFT BREAST IN FEMALE, ESTROGEN RECEPTOR POSITIVE (H): Primary | ICD-10-CM

## 2024-06-03 DIAGNOSIS — C50.412 MALIGNANT NEOPLASM OF UPPER-OUTER QUADRANT OF LEFT BREAST IN FEMALE, ESTROGEN RECEPTOR POSITIVE (H): ICD-10-CM

## 2024-06-03 LAB
ALBUMIN SERPL BCG-MCNC: 4.5 G/DL (ref 3.5–5.2)
ALP SERPL-CCNC: 56 U/L (ref 40–150)
ALT SERPL W P-5'-P-CCNC: 17 U/L (ref 0–50)
ANION GAP SERPL CALCULATED.3IONS-SCNC: 9 MMOL/L (ref 7–15)
AST SERPL W P-5'-P-CCNC: 15 U/L (ref 0–45)
BILIRUB SERPL-MCNC: 0.5 MG/DL
BUN SERPL-MCNC: 15.1 MG/DL (ref 8–23)
CALCIUM SERPL-MCNC: 9.8 MG/DL (ref 8.8–10.2)
CHLORIDE SERPL-SCNC: 99 MMOL/L (ref 98–107)
CREAT SERPL-MCNC: 0.66 MG/DL (ref 0.51–0.95)
DEPRECATED HCO3 PLAS-SCNC: 30 MMOL/L (ref 22–29)
EGFRCR SERPLBLD CKD-EPI 2021: >90 ML/MIN/1.73M2
GLUCOSE SERPL-MCNC: 109 MG/DL (ref 70–99)
POTASSIUM SERPL-SCNC: 4 MMOL/L (ref 3.4–5.3)
PROT SERPL-MCNC: 7.2 G/DL (ref 6.4–8.3)
SODIUM SERPL-SCNC: 138 MMOL/L (ref 135–145)

## 2024-06-03 PROCEDURE — 36415 COLL VENOUS BLD VENIPUNCTURE: CPT

## 2024-06-03 PROCEDURE — 99214 OFFICE O/P EST MOD 30 MIN: CPT | Performed by: INTERNAL MEDICINE

## 2024-06-03 PROCEDURE — G0463 HOSPITAL OUTPT CLINIC VISIT: HCPCS | Performed by: INTERNAL MEDICINE

## 2024-06-03 PROCEDURE — G2211 COMPLEX E/M VISIT ADD ON: HCPCS | Performed by: INTERNAL MEDICINE

## 2024-06-03 PROCEDURE — 82247 BILIRUBIN TOTAL: CPT

## 2024-06-03 RX ORDER — ONDANSETRON 4 MG/1
TABLET, ORALLY DISINTEGRATING ORAL
COMMUNITY
Start: 2024-05-29

## 2024-06-03 RX ORDER — RIVAROXABAN 10 MG/1
TABLET, FILM COATED ORAL
COMMUNITY
Start: 2024-05-29

## 2024-06-03 RX ORDER — OXYCODONE HYDROCHLORIDE 5 MG/1
TABLET ORAL
COMMUNITY
Start: 2024-05-29

## 2024-06-03 RX ORDER — MUPIROCIN 20 MG/G
OINTMENT TOPICAL
COMMUNITY
Start: 2024-05-29

## 2024-06-03 RX ORDER — CEFADROXIL 500 MG/1
CAPSULE ORAL
COMMUNITY
Start: 2024-05-29

## 2024-06-03 RX ORDER — HYDROXYZINE HYDROCHLORIDE 10 MG/1
TABLET, FILM COATED ORAL
COMMUNITY
Start: 2024-05-29

## 2024-06-03 ASSESSMENT — PAIN SCALES - GENERAL: PAINLEVEL: MILD PAIN (2)

## 2024-06-03 NOTE — PROGRESS NOTES
"Oncology Rooming Note    Pauline 3, 2024 10:44 AM   Kristin Almanzar is a 68 year old female who presents for:    Chief Complaint   Patient presents with    Oncology Clinic Visit     4 month return visit with labs related to Malignant neoplasm of upper-outer quadrant of left breast in female, estrogen receptor positive.     Initial Vitals: BP (!) 147/68 (BP Location: Left arm, Patient Position: Sitting, Cuff Size: Adult Regular)   Pulse 60   Temp 98.2  F (36.8  C) (Oral)   Resp 16   Ht 1.702 m (5' 7\")   Wt 72.5 kg (159 lb 12.8 oz)   LMP  (LMP Unknown)   SpO2 99%   BMI 25.03 kg/m   Estimated body mass index is 25.03 kg/m  as calculated from the following:    Height as of this encounter: 1.702 m (5' 7\").    Weight as of this encounter: 72.5 kg (159 lb 12.8 oz). Body surface area is 1.85 meters squared.  Mild Pain (2) Comment: Data Unavailable   No LMP recorded (lmp unknown). Patient has had a hysterectomy.  Allergies reviewed: Yes  Medications reviewed: Yes    Medications: MEDICATION REFILLS NEEDED TODAY. Provider was notified.  Pharmacy name entered into EPIC:    [facility], [place] - [address] [place] [address] [facility] ST[facility] #[id number] - [place], [place] - [facility] [address] [id number] - [place], [place] - [address]    Frailty Screening:   Is the patient here for a new oncology consult visit in cancer care? 2. No      Clinical concerns: middle/upper right side of back 2/10, wakes with daily x 1 month. Right hip replacement scheduled for 06/13/24.  Dr Rajput was notified.      Cee Palacios [facility]              "

## 2024-06-03 NOTE — LETTER
6/3/2024         RE: Kristin Almanzar  [address] [place] [address]        Dear Colleague,    Thank you for referring your patient, Kristin Almanzar, to the [facility] [place]. Please see a copy of my visit note below.    [facility] Hematology and Oncology Consult Note    Patient: Kristin Almanzar  MRN: [id number]  Date of Service: Ismael 3, 2024       Reason for consultation      Problem List Items Addressed This Visit          Other    Malignant neoplasm of upper-outer quadrant of left breast in female, estrogen receptor positive (H) - Primary    Relevant Orders    Comprehensive metabolic panel    DX Bone Density    Long term (current) use of aromatase inhibitors     Relevant Orders    Comprehensive metabolic panel    DX Bone Density         Assessment / number of problems addressed      1.  A very pleasant 68 year old woman with invasive ductal carcinoma left breast T2 N1 M0 ER/AZ positive HER2/sloane negative status postlumpectomy.  Started on anastrozole in the middle of October 2022.  So far seems to be tolerating it well.  Slight hyponatremia and musculoskeletal side effects.  2.  Oncotype score of 16.  Normal bone density in November 2022.  3.  Good general health otherwise.  4.  Some concern regarding endocrine therapy.  5.  Some degree of anxiety.  6.  Slight vaginal dryness secondary to anastrozole.  She is also noticing some joint stiffness typical of aromatase inhibitor therapy.  7.  Enlarged thyroid gland.  She sees an endocrinologist for that.  8.  Right hip osteoarthritis.  Is going to have surgery in a week or 2.    Plan and medical decision making      Presenting with moderate side effects from treatment including hyponatremia.  Reviewed notes from each unique source.  Reviewed each unique test.  Ordered tests .  Independently interpreted lab tests and radiological exams performed by other physician including last mammogram.      Recommend treatment with  anastrozole 1 mg p.o. daily.  She will continue this treatment until October 2027.  Annual mammogram.  That 1 is due sometime in July this year.  Bone density in November 2024.  Discussed with the patient that if her back pain which is currently very suggestive of a musculoskeletal discomfort persist then she should let us know and then we may do some imaging studies.  At this point I think she is favoring her left hip to walk due to the right hip osteoarthritis that might be causing some achiness in her back.  Continue to use moisturizing agents for vaginal dryness.  The longitudinal plan of care for the diagnosis(es)/condition(s) as documented were addressed during this visit. Due to the added complexity in care, I will continue to support Kristin in the subsequent management and with ongoing continuity of care.     Clinical/pathological stage       Cancer Staging   No matching staging information was found for the patient.      History of present illness      Ms. Kristin Almanzar is a 68 year old  referred to us for evaluation of left-sided breast cancer diagnosed in July 2022 when she presented for mammogram in June 2022.  The mammogram was abnormal.  She had some additional studies and then a biopsy.  The biopsy confirmed invasive ductal carcinoma.  She underwent surgery on 27th July 2022.  She had lumpectomy with sentinel lymph node biopsy done.  She had 24 mm invasive ductal carcinoma removed.  [place] lymph nodes was positive  with a 6 mm deposit.  Focal extracapsular extension.  Margins were negative.    She also had an Oncotype score done.  The Oncotype score is 16.    Besides hypertension she has been very healthy person.  Dr. Obrien is her primary care doctor.    Finished radiation in October 2022.  Started on anastrozole after that.  So far seems to be tolerating it okay.  Did do a bone density before starting and it was normal.    Had a visit with Dr. Allen in August 2023 which went fine.  Breast exam  "was normal.  Her last mammogram was normal as well.  No significant change in her weight.  She is having some hip joint issues and is contemplating surgery down the road.    Comes in today for scheduled follow-up.  Overall seems to be doing okay.  Has not had any other medical visits since her last visit here. Having some issues with vaginal dryness.    Review of system      Details noted in the history of present illness.  A detailed review of systems is otherwise negative.      Physical exam        BP (!) 147/68 (BP Location: Left arm, Patient Position: Sitting, Cuff Size: Adult Regular)   Pulse 60   Temp 98.2  F (36.8  C) (Oral)   Resp 16   Ht 1.702 m (5' 7\")   Wt 72.5 kg (159 lb 12.8 oz)   LMP  (LMP Unknown)   SpO2 99%   BMI 25.03 kg/m      GENERAL: No acute distress. Cooperative in conversation.   HEENT:  Pupils are equal, round and reactive. Oral mucosa is clean and intact. No ulcerations or mucositis noted. No bleeding noted.  RESP:Chest symmetric lungs are clear bilaterally per auscultation. Regular respiratory rate. No wheezes or rhonchi.  CV: Normal S1 S2 Regular, rate and rhythm.     BREAST: No lumps palpable bilaterally. No skin discoloration or dimpling. No nipple retraction. Small lumpectomy scar on left breast is well healed. Minimal scar tissue.  LYMPH: No cervical, supraclavicular, or axillary lymphadenopathy.   ABD: Nondistended, soft, nontender. Positive bowel sounds. No organomegaly.   EXTREMITIES: No lower extremity edema.   NEURO: Non- focal. Alert and oriented x3.  Cranial nerves appear intact.  PSYCH: Within normal limits. No depression or anxiety.  SKIN: Warm dry intact.  BREAST: Bilateral breast examination was done by Connie John our physician assistant.  Normal exam.      Lab results Reviewed      Recent Results (from the past 168 hour(s))   CBC with platelets   Result Value Ref Range    WBC Count 5.0 4.0 - 11.0 10e3/uL    RBC Count 4.12 3.80 - 5.20 10e6/uL    Hemoglobin 13.1 " 11.7 - 15.7 g/dL    Hematocrit 38.0 35.0 - 47.0 %    MCV 92 78 - 100 fL    MCH 31.8 26.5 - 33.0 pg    MCHC 34.5 31.5 - 36.5 g/dL    RDW 12.5 10.0 - 15.0 %    Platelet Count 255 150 - 450 10e3/uL   Comprehensive metabolic panel   Result Value Ref Range    Sodium 134 (L) 135 - 145 mmol/L    Potassium 4.0 3.4 - 5.3 mmol/L    Carbon Dioxide (CO2) 28 22 - 29 mmol/L    Anion Gap 9 7 - 15 mmol/L    Urea Nitrogen 11.6 8.0 - 23.0 mg/dL    Creatinine 0.58 0.51 - 0.95 mg/dL    GFR Estimate >90 >60 mL/min/1.73m2    Calcium 9.7 8.8 - 10.2 mg/dL    Chloride 97 (L) 98 - 107 mmol/L    Glucose 109 (H) 70 - 99 mg/dL    Alkaline Phosphatase 51 40 - 150 U/L    AST 34 0 - 45 U/L    ALT 16 0 - 50 U/L    Protein Total 7.0 6.4 - 8.3 g/dL    Albumin 4.5 3.5 - 5.2 g/dL    Bilirubin Total 0.3 <=1.2 mg/dL   TSH   Result Value Ref Range    TSH 0.70 0.30 - 4.20 uIU/mL   UA Macroscopic with reflex to Microscopic and Culture    Specimen: Urine, Clean Catch   Result Value Ref Range    Color Urine Yellow Colorless, Straw, Light Yellow, Yellow    Appearance Urine Clear Clear    Glucose Urine Negative Negative mg/dL    Bilirubin Urine Negative Negative    Ketones Urine Negative Negative mg/dL    Specific Gravity Urine 1.010 1.005 - 1.030    Blood Urine Negative Negative    pH Urine 7.0 5.0 - 8.0    Protein Albumin Urine Negative Negative mg/dL    Urobilinogen Urine 0.2 0.2, 1.0 E.U./dL    Nitrite Urine Negative Negative    Leukocyte Esterase Urine Negative Negative   Comprehensive metabolic panel   Result Value Ref Range    Sodium 138 135 - 145 mmol/L    Potassium 4.0 3.4 - 5.3 mmol/L    Carbon Dioxide (CO2) 30 (H) 22 - 29 mmol/L    Anion Gap 9 7 - 15 mmol/L    Urea Nitrogen 15.1 8.0 - 23.0 mg/dL    Creatinine 0.66 0.51 - 0.95 mg/dL    GFR Estimate >90 >60 mL/min/1.73m2    Calcium 9.8 8.8 - 10.2 mg/dL    Chloride 99 98 - 107 mmol/L    Glucose 109 (H) 70 - 99 mg/dL    Alkaline Phosphatase 56 40 - 150 U/L    AST 15 0 - 45 U/L    ALT 17 0 - 50 U/L     "Protein Total 7.2 6.4 - 8.3 g/dL    Albumin 4.5 3.5 - 5.2 g/dL    Bilirubin Total 0.5 <=1.2 mg/dL       Imaging results Reviewed        No results found.      Signed by: Quincy Rajput MD      This note has been dictated using voice recognition software. Any grammatical or context distortions are unintentional and inherent to the software      Oncology Rooming Note    Pauline 3, 2024 10:44 AM   Kristin Almanzar is a 68 year old female who presents for:    Chief Complaint   Patient presents with     Oncology Clinic Visit     4 month return visit with labs related to Malignant neoplasm of upper-outer quadrant of left breast in female, estrogen receptor positive.     Initial Vitals: BP (!) 147/68 (BP Location: Left arm, Patient Position: Sitting, Cuff Size: Adult Regular)   Pulse 60   Temp 98.2  F (36.8  C) (Oral)   Resp 16   Ht 1.702 m (5' 7\")   Wt 72.5 kg (159 lb 12.8 oz)   LMP  (LMP Unknown)   SpO2 99%   BMI 25.03 kg/m   Estimated body mass index is 25.03 kg/m  as calculated from the following:    Height as of this encounter: 1.702 m (5' 7\").    Weight as of this encounter: 72.5 kg (159 lb 12.8 oz). Body surface area is 1.85 meters squared.  Mild Pain (2) Comment: Data Unavailable   No LMP recorded (lmp unknown). Patient has had a hysterectomy.  Allergies reviewed: Yes  Medications reviewed: Yes    Medications: MEDICATION REFILLS NEEDED TODAY. Provider was notified.  Pharmacy name entered into EPIC:    [facility] - [place], [place] - [address] [place] [address] DRUG STORE #[id number] - [place], [place] - [address] [place] [address] [address] AT [facility] [address] & [facility] #[id number] - [place] [address]    Frailty Screening:   Is the patient here for a new oncology consult visit in cancer care? 2. No      Clinical concerns: middle/upper right side of back 2/10, wakes with daily x 1 month. Right hip replacement scheduled for 06/13/24.  Dr Rajput was " notified.      Cee Palacios CMA                Again, thank you for allowing me to participate in the care of your patient.        Sincerely,        Quincy Rajput MD

## 2024-06-04 NOTE — PROGRESS NOTES
[facility]: Cancer Care                                                                                            Situation: Patient chart reviewed by care coordinator.    Background: Patient with a diagnosis of invasive ductal carcinoma of the left breast which is ER/AR positive and HER2/sloane negative.  She is status post lumpectomy and started anastrozole in the middle of October 2022.    Assessment: Patient was in for follow-up with Dr Rajput.  It is recommended that she continue anastrozole until October 2027.  She will be due for a bone density scan in November 2024.  She should continue with annual mammograms.    Plan/Recommendations: Patient has been scheduled for DEXA scan on 11/12 and follow-up on 12/3 for lab and Katlin Cadena CNP.    I will monitor for DEXA scan results, review with the provider and update patient.    Signature:  Suzan Bermudez RN

## 2024-06-13 ENCOUNTER — TRANSFERRED RECORDS (OUTPATIENT)
Dept: HEALTH INFORMATION MANAGEMENT | Facility: CLINIC | Age: 69
End: 2024-06-13
Payer: COMMERCIAL

## 2024-06-27 ENCOUNTER — TRANSFERRED RECORDS (OUTPATIENT)
Dept: HEALTH INFORMATION MANAGEMENT | Facility: CLINIC | Age: 69
End: 2024-06-27
Payer: COMMERCIAL

## 2024-07-01 ENCOUNTER — PATIENT OUTREACH (OUTPATIENT)
Dept: CARE COORDINATION | Facility: CLINIC | Age: 69
End: 2024-07-01
Payer: COMMERCIAL

## 2024-07-25 ENCOUNTER — PATIENT OUTREACH (OUTPATIENT)
Dept: CARE COORDINATION | Facility: CLINIC | Age: 69
End: 2024-07-25
Payer: COMMERCIAL

## 2024-07-25 ENCOUNTER — TRANSFERRED RECORDS (OUTPATIENT)
Dept: HEALTH INFORMATION MANAGEMENT | Facility: CLINIC | Age: 69
End: 2024-07-25
Payer: COMMERCIAL

## 2024-08-08 ENCOUNTER — PATIENT OUTREACH (OUTPATIENT)
Dept: CARE COORDINATION | Facility: CLINIC | Age: 69
End: 2024-08-08
Payer: COMMERCIAL

## 2024-08-21 ENCOUNTER — ANCILLARY PROCEDURE (OUTPATIENT)
Dept: MAMMOGRAPHY | Facility: CLINIC | Age: 69
End: 2024-08-21
Attending: SPECIALIST
Payer: COMMERCIAL

## 2024-08-21 DIAGNOSIS — Z12.31 VISIT FOR SCREENING MAMMOGRAM: ICD-10-CM

## 2024-08-21 PROCEDURE — 77063 BREAST TOMOSYNTHESIS BI: CPT

## 2024-09-23 NOTE — PROGRESS NOTES
"Oncology Rooming Note    November 14, 2022 2:13 PM   Kristin Almanzar is a 67 year old female who presents for:    Chief Complaint   Patient presents with     Oncology Clinic Visit     4 week follow up with labs and prior DEXA review related to Malignant neoplasm of upper-outer quadrant of left breast in female, estrogen receptor positive     Initial Vitals: BP (!) 173/91 (BP Location: Right arm, Patient Position: Sitting, Cuff Size: Adult Regular)   Pulse 65   Temp 97.7  F (36.5  C) (Tympanic)   Resp 14   Ht 1.721 m (5' 7.75\")   Wt 70.4 kg (155 lb 3.2 oz)   SpO2 98%   BMI 23.77 kg/m   Estimated body mass index is 23.77 kg/m  as calculated from the following:    Height as of this encounter: 1.721 m (5' 7.75\").    Weight as of this encounter: 70.4 kg (155 lb 3.2 oz). Body surface area is 1.83 meters squared.  No Pain (0) Comment: Data Unavailable   No LMP recorded. Patient is postmenopausal.  Allergies reviewed: Yes  Medications reviewed: Yes    Medications: Medication refills not needed today.  Pharmacy name entered into EPIC:    [facility]  [facility] - [address] DRUG STORE #[id number] - [address] [place] [address] & [facility]  [facility] - [address]    Clinical concerns: 4 week follow up with labs and prior DEXA review related to Malignant neoplasm of upper-outer quadrant of left breast in female, estrogen receptor positive    MEENA PAREDES CMA            " Walk in

## 2024-09-26 ENCOUNTER — PATIENT OUTREACH (OUTPATIENT)
Dept: CARE COORDINATION | Facility: CLINIC | Age: 69
End: 2024-09-26
Payer: COMMERCIAL

## 2024-09-28 ENCOUNTER — HEALTH MAINTENANCE LETTER (OUTPATIENT)
Age: 69
End: 2024-09-28

## 2024-11-11 NOTE — PROGRESS NOTES
Per Dr. Allen's  request, order for Oncotype submitted through [employer] online portal.   What Type Of Note Output Would You Prefer (Optional)?: Standard Output Is This A New Presentation, Or A Follow-Up?: Acne

## 2024-11-12 ENCOUNTER — HOSPITAL ENCOUNTER (OUTPATIENT)
Dept: BONE DENSITY | Facility: HOSPITAL | Age: 69
Discharge: HOME | End: 2024-11-12
Attending: INTERNAL MEDICINE | Admitting: INTERNAL MEDICINE
Payer: COMMERCIAL

## 2024-11-12 DIAGNOSIS — C50.412 MALIGNANT NEOPLASM OF UPPER-OUTER QUADRANT OF LEFT BREAST IN FEMALE, ESTROGEN RECEPTOR POSITIVE (H): ICD-10-CM

## 2024-11-12 DIAGNOSIS — Z17.0 MALIGNANT NEOPLASM OF UPPER-OUTER QUADRANT OF LEFT BREAST IN FEMALE, ESTROGEN RECEPTOR POSITIVE (H): ICD-10-CM

## 2024-11-12 DIAGNOSIS — Z79.811 LONG TERM (CURRENT) USE OF AROMATASE INHIBITORS: ICD-10-CM

## 2024-11-12 PROCEDURE — 77080 DXA BONE DENSITY AXIAL: CPT

## 2024-11-26 ENCOUNTER — E-VISIT (OUTPATIENT)
Dept: URGENT CARE | Facility: CLINIC | Age: 69
End: 2024-11-26
Payer: COMMERCIAL

## 2024-11-26 DIAGNOSIS — N39.0 ACUTE UTI (URINARY TRACT INFECTION): Primary | ICD-10-CM

## 2024-11-27 RX ORDER — NITROFURANTOIN 25; 75 MG/1; MG/1
100 CAPSULE ORAL 2 TIMES DAILY
Qty: 10 CAPSULE | Refills: 0 | Status: SHIPPED | OUTPATIENT
Start: 2024-11-27 | End: 2024-12-02

## 2024-11-27 NOTE — PATIENT INSTRUCTIONS
Dear Kristin Almanzar    After reviewing your responses, I've been able to diagnose you with a urinary tract infection, which is a common infection of the bladder with bacteria.  This is not a sexually transmitted infection, though urinating immediately after intercourse can help prevent infections.  Drinking lots of fluids is also helpful to clear your current infection and prevent the next one.      I have sent a prescription for antibiotics to your pharmacy to treat this infection.    It is important that you take all of your prescribed medication even if your symptoms are improving after a few doses.  Taking all of your medicine helps prevent the symptoms from returning.     If your symptoms worsen, you develop pain in your back or stomach, develop fevers, or are not improving in 5 days, please contact your primary care provider for an appointment or visit any of our convenient Walk-in or Urgent Care Centers to be seen, which can be found on our website here.    Thanks again for choosing us as your health care partner,    KENDRICK Townsend CNP  Urinary Tract Infection (UTI) in Women: Care Instructions  Overview     A urinary tract infection (UTI) is an infection caused by bacteria. It can happen anywhere in the urinary tract. A UTI can happen in the:  Kidneys.  Ureters, the tubes that connect the kidneys to the bladder.  Bladder.  Urethra, where the urine comes out.  Most UTIs are bladder infections. They often cause pain or burning when you urinate.  Most UTIs can be cured with antibiotics. If you are prescribed antibiotics, be sure to complete your treatment so that the infection does not get worse.  Follow-up care is a key part of your treatment and safety. Be sure to make and go to all appointments, and call your doctor if you are having problems. It's also a good idea to know your test results and keep a list of the medicines you take.  How can you care for yourself at home?  Take your antibiotics as  "directed. Do not stop taking them just because you feel better. You need to take the full course of antibiotics.  Drink extra water and other fluids for the next day or two. This will help make the urine less concentrated and help wash out the bacteria that are causing the infection. (If you have kidney, heart, or liver disease and have to limit fluids, talk with your doctor before you increase the amount of fluids you drink.)  Avoid drinks that are carbonated or have caffeine. They can irritate the bladder.  Urinate often. Try to empty your bladder each time.  To relieve pain, take a hot bath or lay a heating pad set on low over your lower belly or genital area. Never go to sleep with a heating pad in place.  To prevent UTIs  Drink plenty of water each day. This helps you urinate often, which clears bacteria from your system. (If you have kidney, heart, or liver disease and have to limit fluids, talk with your doctor before you increase the amount of fluids you drink.)  Urinate when you need to.  If you are sexually active, urinate right after you have sex.  Change sanitary pads often.  Avoid douches, bubble baths, feminine hygiene sprays, and other feminine hygiene products that have deodorants.  After going to the bathroom, wipe from front to back.  When should you call for help?   Call your doctor now or seek immediate medical care if:    You have new or worse fever, chills, nausea, or vomiting.     You have new pain in your back just below your rib cage. This is called flank pain.     There is new blood or pus in your urine.     You have any problems with your antibiotic medicine.   Watch closely for changes in your health, and be sure to contact your doctor if:    You are not getting better after taking an antibiotic for 2 days.     Your symptoms go away but then come back.   Where can you learn more?  Go to https://www.healthwise.net/patiented  Enter K848 in the search box to learn more about \"Urinary Tract " "Infection (UTI) in Women: Care Instructions.\"  Current as of: November 15, 2023  Content Version: 14.2 2024 [facility].   Care instructions adapted under license by your healthcare professional. If you have questions about a medical condition or this instruction, always ask your healthcare professional. [employer] disclaims any warranty or liability for your use of this information.    "

## 2024-12-03 ENCOUNTER — LAB (OUTPATIENT)
Dept: INFUSION THERAPY | Facility: HOSPITAL | Age: 69
End: 2024-12-03
Payer: COMMERCIAL

## 2024-12-03 ENCOUNTER — PATIENT OUTREACH (OUTPATIENT)
Dept: ONCOLOGY | Facility: HOSPITAL | Age: 69
End: 2024-12-03

## 2024-12-03 ENCOUNTER — ONCOLOGY VISIT (OUTPATIENT)
Dept: ONCOLOGY | Facility: HOSPITAL | Age: 69
End: 2024-12-03
Payer: COMMERCIAL

## 2024-12-03 VITALS
OXYGEN SATURATION: 98 % | WEIGHT: 161 LBS | HEART RATE: 56 BPM | DIASTOLIC BLOOD PRESSURE: 81 MMHG | BODY MASS INDEX: 25.27 KG/M2 | TEMPERATURE: 97.4 F | HEIGHT: 67 IN | RESPIRATION RATE: 16 BRPM | SYSTOLIC BLOOD PRESSURE: 185 MMHG

## 2024-12-03 DIAGNOSIS — C50.412 MALIGNANT NEOPLASM OF UPPER-OUTER QUADRANT OF LEFT BREAST IN FEMALE, ESTROGEN RECEPTOR POSITIVE (H): ICD-10-CM

## 2024-12-03 DIAGNOSIS — Z17.0 MALIGNANT NEOPLASM OF UPPER-OUTER QUADRANT OF LEFT BREAST IN FEMALE, ESTROGEN RECEPTOR POSITIVE (H): ICD-10-CM

## 2024-12-03 DIAGNOSIS — Z79.811 LONG TERM (CURRENT) USE OF AROMATASE INHIBITORS: ICD-10-CM

## 2024-12-03 LAB
ALBUMIN SERPL BCG-MCNC: 4.4 G/DL (ref 3.5–5.2)
ALP SERPL-CCNC: 58 U/L (ref 40–150)
ALT SERPL W P-5'-P-CCNC: 17 U/L (ref 0–50)
ANION GAP SERPL CALCULATED.3IONS-SCNC: 9 MMOL/L (ref 7–15)
AST SERPL W P-5'-P-CCNC: 18 U/L (ref 0–45)
BILIRUB SERPL-MCNC: 0.3 MG/DL
BUN SERPL-MCNC: 14.2 MG/DL (ref 8–23)
CALCIUM SERPL-MCNC: 10.1 MG/DL (ref 8.8–10.4)
CHLORIDE SERPL-SCNC: 99 MMOL/L (ref 98–107)
CREAT SERPL-MCNC: 0.64 MG/DL (ref 0.51–0.95)
EGFRCR SERPLBLD CKD-EPI 2021: >90 ML/MIN/1.73M2
GLUCOSE SERPL-MCNC: 98 MG/DL (ref 70–99)
HCO3 SERPL-SCNC: 31 MMOL/L (ref 22–29)
POTASSIUM SERPL-SCNC: 4.1 MMOL/L (ref 3.4–5.3)
PROT SERPL-MCNC: 6.8 G/DL (ref 6.4–8.3)
SODIUM SERPL-SCNC: 139 MMOL/L (ref 135–145)

## 2024-12-03 PROCEDURE — 36415 COLL VENOUS BLD VENIPUNCTURE: CPT

## 2024-12-03 PROCEDURE — G2211 COMPLEX E/M VISIT ADD ON: HCPCS | Performed by: NURSE PRACTITIONER

## 2024-12-03 PROCEDURE — 99213 OFFICE O/P EST LOW 20 MIN: CPT | Performed by: NURSE PRACTITIONER

## 2024-12-03 PROCEDURE — G0463 HOSPITAL OUTPT CLINIC VISIT: HCPCS | Performed by: NURSE PRACTITIONER

## 2024-12-03 PROCEDURE — 80053 COMPREHEN METABOLIC PANEL: CPT

## 2024-12-03 RX ORDER — ANASTROZOLE 1 MG/1
1 TABLET ORAL DAILY
Qty: 90 TABLET | Refills: 3 | Status: SHIPPED | OUTPATIENT
Start: 2024-12-03

## 2024-12-03 RX ORDER — CLINDAMYCIN HYDROCHLORIDE 300 MG/1
CAPSULE ORAL
COMMUNITY
Start: 2024-10-07

## 2024-12-03 ASSESSMENT — PAIN SCALES - GENERAL: PAINLEVEL_OUTOF10: NO PAIN (0)

## 2024-12-03 NOTE — LETTER
"12/3/2024      Kristin Almanzar  [address] [place] [address]      Dear Colleague,    Thank you for referring your patient, Kristin Almanzar, to the [facility] CANCER [facility]. Please see a copy of my visit note below.    Oncology Rooming Note    December 3, 2024 10:25 AM   Kristin Almanzar is a 69 year old female who presents for:    Chief Complaint   Patient presents with     Oncology Clinic Visit     6 month follow up with labs and prior DEXA review     Initial Vitals: BP (!) 185/81 (BP Location: Right arm, Patient Position: Sitting, Cuff Size: Adult Regular)   Pulse 56   Temp 97.4  F (36.3  C) (Tympanic)   Resp 16   Ht 1.702 m (5' 7\")   Wt 73 kg (161 lb)   LMP  (LMP Unknown)   SpO2 98%   BMI 25.22 kg/m   Estimated body mass index is 25.22 kg/m  as calculated from the following:    Height as of this encounter: 1.702 m (5' 7\").    Weight as of this encounter: 73 kg (161 lb). Body surface area is 1.86 meters squared.  No Pain (0) Comment: Data Unavailable   No LMP recorded (lmp unknown). Patient has had a hysterectomy.  Allergies reviewed: Yes  Medications reviewed: Yes    Medications: MEDICATION REFILLS NEEDED TODAY. Provider was notified.  Pharmacy name entered into EPIC:    [facility]  FOR [place], [place] - [address] [facility] #[id number] - [place], [place] - [address] [place] [address] [address] AT [facility] [address] & [facility] #[id number] - [place] [address]    Frailty Screening:   Is the patient here for a new oncology consult visit in cancer care? 2. No      Clinical concerns: none       MEENA PAREDES CMA              [facility] Hematology and Oncology Progress Note    Patient: Kristin Almanzar  MRN: [id number]  Date of Service: Dec 3, 2024         Reason for Visit    Chief Complaint   Patient presents with     Oncology Clinic Visit     6 month follow up with labs and prior DEXA review       Assessment and " Plan    1. Breast Cancer, Stage I:  Continue anastrazole 1mg p.o. daily. The patient will be on this medication for 5 years which will be until October 2027. Mammo due in August. No evidence of recurrence today. Will return in 6 months.     2. Mild side effects from AI, Vaginal dryness, dry skin, stiffness:   These side effects are fairly manageable.  She states that she did have her right hip replaced in June and most of her pain is gone.  She still is a little stiff if she sitting too long or first thing in the morning but she is trying to be very active.  She will need to monitor for side effects and let us know if anything gets worse.    3. Risk of osteoporosis:   Current DEXA is now showing osteopenia. Encourage her to start Calcium/Vitamin D BID. Recheck DEXA in Novembet 2026.  Does do weightlifting twice a week.  Encouraged her to continue to do that as often as she can.       Cancer Staging   No matching staging information was found for the patient.      ECOG Performance    0 - Independent     Pain  Pain Score: No Pain (0)    History of Present Illness    Oncologist: Dr. Rajput    Ms. Kristin Almanzar is a 69 year old who has been referred to us for evaluation of left-sided breast cancer diagnosed in July 2022 when she presented for mammogram in June 2022.  The mammogram was abnormal.  She had some additional studies and then a biopsy.  The biopsy confirmed invasive ductal carcinoma.  She underwent surgery on 27th July 2022.  She had lumpectomy with sentinel lymph node biopsy done.  She had 24 mm invasive ductal carcinoma removed.  [place] lymph nodes was positive 1 out of 2 with a 6 mm deposit.  No extracapsular extension.  Margins were negative. The Oncotype score is 16.    Finished radiation in October 2022.  Started on anastrozole after that.  So far seems to be tolerating it okay.  Did do a bone density before starting and it was normal.     Patient states that she is feeling about the same.  She had  "her hip replaced in June and now her pain is pretty much all gone.  Still a little stiffness in the morning and if she sitting too long but that is manageable by just moving around.  She does do weight training twice a week.      Review of systems.    Pertinent information is listed in the HPI.     Past History    Past Medical History:   Diagnosis Date     Arthritis last summer    right hip only     Cancer (H) June 2022     History of anesthesia complications     vinethane - slow to wake up     Hypertension      PONV (postoperative nausea and vomiting)     as a child had vinethane slow to awake       Past Surgical History:   Procedure Laterality Date     ABDOMEN SURGERY  August 2013    colectomy-rt. roxie     APPENDECTOMY       BIOPSY       COLON SURGERY Right     Colectomy - Large polyp     COLONOSCOPY       COLPORRHAPHY N/A 06/27/2017    Procedure:  UTEROSACRAL LIGAMENT SUSPENSION CYSTOSCOPY;  Surgeon: Nancy Girard MD;  Location: [facility];  Service:      EYE SURGERY Left     Cataract     HYSTERECTOMY  06/27/2017     HYSTERECTOMY VAGINAL N/A 06/27/2017    Procedure: VAGINAL HYSTERECTOMY;  Surgeon: Nancy Girard MD;  Location: [facility];  Service:      LUMPECTOMY BREAST Left 07/27/2022    Procedure: Left Lumpectpmy; [place] Lymph Node Biopsy;  Surgeon: Anastasia Allen MD;  Location: [facility]         Physical Exam    BP (!) 185/81 (BP Location: Right arm, Patient Position: Sitting, Cuff Size: Adult Regular)   Pulse 56   Temp 97.4  F (36.3  C) (Tympanic)   Resp 16   Ht 1.702 m (5' 7\")   Wt 73 kg (161 lb)   LMP  (LMP Unknown)   SpO2 98%   BMI 25.22 kg/m      GENERAL: no acute distress. Cooperative in conversation. Here alone.   RESP: Regular respiratory rate. No expiratory wheezes   MUSCULOSKELETAL: no bilateral leg swelling  NEURO: non focal. Alert and oriented x3.   PSYCH: within normal limits. No depression or anxiety.  SKIN: exposed skin is dry intact.   BREAST: " Bilateral exam done.  Lumpectomy incision is well-healed.  Some radiation skin changes with discoloration and scarring.  No abnormal lesions or rashes noted.  No evidence for local recurrence bilaterally.      Lab Results    Recent Results (from the past week)   Comprehensive metabolic panel   Result Value Ref Range    Sodium 139 135 - 145 mmol/L    Potassium 4.1 3.4 - 5.3 mmol/L    Carbon Dioxide (CO2) 31 (H) 22 - 29 mmol/L    Anion Gap 9 7 - 15 mmol/L    Urea Nitrogen 14.2 8.0 - 23.0 mg/dL    Creatinine 0.64 0.51 - 0.95 mg/dL    GFR Estimate >90 >60 mL/min/1.73m2    Calcium 10.1 8.8 - 10.4 mg/dL    Chloride 99 98 - 107 mmol/L    Glucose 98 70 - 99 mg/dL    Alkaline Phosphatase 58 40 - 150 U/L    AST 18 0 - 45 U/L    ALT 17 0 - 50 U/L    Protein Total 6.8 6.4 - 8.3 g/dL    Albumin 4.4 3.5 - 5.2 g/dL    Bilirubin Total 0.3 <=1.2 mg/dL         Imaging    DX Bone Density    Result Date: 11/13/2024  EXAM: DX TBS AXIAL LOCATION: [facility] DATE: 11/12/2024 INDICATION: Follow-up BMD screening. DEMOGRAPHICS: Age- 69 years. Gender- Female. COMPARISON: 11/11/2022. TECHNIQUE: Dual-energy x-ray absorptiometry (DXA) performed with routine technique. Trabecular bone score (TBS) analysis performed. FINDINGS: DXA RESULTS -Lumbar Spine: L1-L4 (L3): BMD: 1.005 g/cm2. T-score: -1.4. Z-score: 0.3. -LEFT Hip Total: BMD: 1.058 g/cm2. T-score: 0.4. Z-score: 1.8. -LEFT Hip Femoral neck: BMD: 1.060 g/cm2. T-score: 0.2. Z-score: 1.8. WHO T-SCORE CRITERIA -Normal: T score at or above -1 SD -Osteopenia: T score between -1 and -2.5 SD -Osteoporosis: T score at or below -2.5 SD The World Health Organization (WHO) criteria is applicable to perimenopausal females, postmenopausal females, and men aged 50 years or older. TBS RESULTS -Lumbar Spine L1-L4 (L3): TBS: 1.225. TBS T-score: -2.5.TBS Z-score: -0.3. The TBS is a DXA derived measurement for fracture risk assessment, and reflects the structural condition of the bone  microarchitecture. It can be used to adjust WHO Fracture Risk Assessment Tool (FRAX) probability of fracture in postmenopausal women and older men. The calculated probabilities of fracture have been shown to be more accurate when computed with the TBS. INTERVAL CHANGE -There has been a 6.4% decrease in lumbar spine BMD. -There has been a 3.5% decrease in the left hip BMD. FRACTURE RISK -The FRAX risk calculator is not applicable due to medication that is used for treatment of osteopenia/osteoporosis or can otherwise affect bone mineral density.     IMPRESSION: Low bone density (OSTEOPENIA). T score meets the WHO criteria for low bone density (osteopenia) at one or more measured sites. The risk of osteoporotic fracture increases approximately two-fold for each standard deviation decrease in T-score.     The longitudinal plan of care for the diagnosis(es)/condition(s) as documented were addressed during this visit. Due to the added complexity in care, I will continue to support Kristin in the subsequent management and with ongoing continuity of care.      Signed by: KENDRICK Malone CNP       Again, thank you for allowing me to participate in the care of your patient.        Sincerely,        KENDRICK Malone CNP

## 2024-12-03 NOTE — PROGRESS NOTES
[facility] Hematology and Oncology Progress Note    Patient: Kristin Almanzar  MRN: [id number]  Date of Service: Dec 3, 2024         Reason for Visit    Chief Complaint   Patient presents with    Oncology Clinic Visit     6 month follow up with labs and prior DEXA review       Assessment and Plan    1. Breast Cancer, Stage I:  Continue anastrazole 1mg p.o. daily. The patient will be on this medication for 5 years which will be until October 2027. Mammo due in August. No evidence of recurrence today. Will return in 6 months.     2. Mild side effects from AI, Vaginal dryness, dry skin, stiffness:   These side effects are fairly manageable.  She states that she did have her right hip replaced in June and most of her pain is gone.  She still is a little stiff if she sitting too long or first thing in the morning but she is trying to be very active.  She will need to monitor for side effects and let us know if anything gets worse.    3. Risk of osteoporosis:   Current DEXA is now showing osteopenia. Encourage her to start Calcium/Vitamin D BID. Recheck DEXA in Novembet 2026.  Does do weightlifting twice a week.  Encouraged her to continue to do that as often as she can.       Cancer Staging   No matching staging information was found for the patient.      ECOG Performance    0 - Independent     Pain  Pain Score: No Pain (0)    History of Present Illness    Oncologist: Dr. Rajput    Ms. Kristin Almanzar is a 69 year old who has been referred to us for evaluation of left-sided breast cancer diagnosed in July 2022 when she presented for mammogram in June 2022.  The mammogram was abnormal.  She had some additional studies and then a biopsy.  The biopsy confirmed invasive ductal carcinoma.  She underwent surgery on 27th July 2022.  She had lumpectomy with sentinel lymph node biopsy done.  She had 24 mm invasive ductal carcinoma removed.  [place] lymph nodes was positive 1 out of 2 with a 6 mm deposit.  No  "extracapsular extension.  Margins were negative. The Oncotype score is 16.    Finished radiation in October 2022.  Started on anastrozole after that.  So far seems to be tolerating it okay.  Did do a bone density before starting and it was normal.     Patient states that she is feeling about the same.  She had her hip replaced in June and now her pain is pretty much all gone.  Still a little stiffness in the morning and if she sitting too long but that is manageable by just moving around.  She does do weight training twice a week.      Review of systems.    Pertinent information is listed in the HPI.     Past History    Past Medical History:   Diagnosis Date    Arthritis last summer    right hip only    Cancer (H) June 2022    History of anesthesia complications     vinethane - slow to wake up    Hypertension     PONV (postoperative nausea and vomiting)     as a child had vinethane slow to awake       Past Surgical History:   Procedure Laterality Date    ABDOMEN SURGERY  August 2013    colectomy-rt. roxie    APPENDECTOMY      BIOPSY      COLON SURGERY Right     Colectomy - Large polyp    COLONOSCOPY      COLPORRHAPHY N/A 06/27/2017    Procedure:  UTEROSACRAL LIGAMENT SUSPENSION CYSTOSCOPY;  Surgeon: Nancy Girard MD;  Location: [facility];  Service:     EYE SURGERY Left     Cataract    HYSTERECTOMY  06/27/2017    HYSTERECTOMY VAGINAL N/A 06/27/2017    Procedure: VAGINAL HYSTERECTOMY;  Surgeon: Nancy Girard MD;  Location: [facility];  Service:     LUMPECTOMY BREAST Left 07/27/2022    Procedure: Left Lumpectpmy; [place] Lymph Node Biopsy;  Surgeon: Anastasia Allen MD;  Location: [facility]         Physical Exam    BP (!) 185/81 (BP Location: Right arm, Patient Position: Sitting, Cuff Size: Adult Regular)   Pulse 56   Temp 97.4  F (36.3  C) (Tympanic)   Resp 16   Ht 1.702 m (5' 7\")   Wt 73 kg (161 lb)   LMP  (LMP Unknown)   SpO2 98%   BMI 25.22 kg/m      GENERAL: no acute " distress. Cooperative in conversation. Here alone.   RESP: Regular respiratory rate. No expiratory wheezes   MUSCULOSKELETAL: no bilateral leg swelling  NEURO: non focal. Alert and oriented x3.   PSYCH: within normal limits. No depression or anxiety.  SKIN: exposed skin is dry intact.   BREAST: Bilateral exam done.  Lumpectomy incision is well-healed.  Some radiation skin changes with discoloration and scarring.  No abnormal lesions or rashes noted.  No evidence for local recurrence bilaterally.      Lab Results    Recent Results (from the past week)   Comprehensive metabolic panel   Result Value Ref Range    Sodium 139 135 - 145 mmol/L    Potassium 4.1 3.4 - 5.3 mmol/L    Carbon Dioxide (CO2) 31 (H) 22 - 29 mmol/L    Anion Gap 9 7 - 15 mmol/L    Urea Nitrogen 14.2 8.0 - 23.0 mg/dL    Creatinine 0.64 0.51 - 0.95 mg/dL    GFR Estimate >90 >60 mL/min/1.73m2    Calcium 10.1 8.8 - 10.4 mg/dL    Chloride 99 98 - 107 mmol/L    Glucose 98 70 - 99 mg/dL    Alkaline Phosphatase 58 40 - 150 U/L    AST 18 0 - 45 U/L    ALT 17 0 - 50 U/L    Protein Total 6.8 6.4 - 8.3 g/dL    Albumin 4.4 3.5 - 5.2 g/dL    Bilirubin Total 0.3 <=1.2 mg/dL         Imaging    DX Bone Density    Result Date: 11/13/2024  EXAM: DX TBS AXIAL LOCATION: [facility] DATE: 11/12/2024 INDICATION: Follow-up BMD screening. DEMOGRAPHICS: Age- 69 years. Gender- Female. COMPARISON: 11/11/2022. TECHNIQUE: Dual-energy x-ray absorptiometry (DXA) performed with routine technique. Trabecular bone score (TBS) analysis performed. FINDINGS: DXA RESULTS -Lumbar Spine: L1-L4 (L3): BMD: 1.005 g/cm2. T-score: -1.4. Z-score: 0.3. -LEFT Hip Total: BMD: 1.058 g/cm2. T-score: 0.4. Z-score: 1.8. -LEFT Hip Femoral neck: BMD: 1.060 g/cm2. T-score: 0.2. Z-score: 1.8. WHO T-SCORE CRITERIA -Normal: T score at or above -1 SD -Osteopenia: T score between -1 and -2.5 SD -Osteoporosis: T score at or below -2.5 SD The World Health Organization (WHO) criteria is  applicable to perimenopausal females, postmenopausal females, and men aged 50 years or older. TBS RESULTS -Lumbar Spine L1-L4 (L3): TBS: 1.225. TBS T-score: -2.5.TBS Z-score: -0.3. The TBS is a DXA derived measurement for fracture risk assessment, and reflects the structural condition of the bone microarchitecture. It can be used to adjust WHO Fracture Risk Assessment Tool (FRAX) probability of fracture in postmenopausal women and older men. The calculated probabilities of fracture have been shown to be more accurate when computed with the TBS. INTERVAL CHANGE -There has been a 6.4% decrease in lumbar spine BMD. -There has been a 3.5% decrease in the left hip BMD. FRACTURE RISK -The FRAX risk calculator is not applicable due to medication that is used for treatment of osteopenia/osteoporosis or can otherwise affect bone mineral density.     IMPRESSION: Low bone density (OSTEOPENIA). T score meets the WHO criteria for low bone density (osteopenia) at one or more measured sites. The risk of osteoporotic fracture increases approximately two-fold for each standard deviation decrease in T-score.     The longitudinal plan of care for the diagnosis(es)/condition(s) as documented were addressed during this visit. Due to the added complexity in care, I will continue to support Kristin in the subsequent management and with ongoing continuity of care.      Signed by: KENDRICK Malone CNP

## 2024-12-03 NOTE — PROGRESS NOTES
"Oncology Rooming Note    December 3, 2024 10:25 AM   Kristin Almanzar is a 69 year old female who presents for:    Chief Complaint   Patient presents with    Oncology Clinic Visit     6 month follow up with labs and prior DEXA review     Initial Vitals: BP (!) 185/81 (BP Location: Right arm, Patient Position: Sitting, Cuff Size: Adult Regular)   Pulse 56   Temp 97.4  F (36.3  C) (Tympanic)   Resp 16   Ht 1.702 m (5' 7\")   Wt 73 kg (161 lb)   LMP  (LMP Unknown)   SpO2 98%   BMI 25.22 kg/m   Estimated body mass index is 25.22 kg/m  as calculated from the following:    Height as of this encounter: 1.702 m (5' 7\").    Weight as of this encounter: 73 kg (161 lb). Body surface area is 1.86 meters squared.  No Pain (0) Comment: Data Unavailable   No LMP recorded (lmp unknown). Patient has had a hysterectomy.  Allergies reviewed: Yes  Medications reviewed: Yes    Medications: MEDICATION REFILLS NEEDED TODAY. Provider was notified.  Pharmacy name entered into EPIC:    [facility]  FOR [facility], [place] - [address] [facility] #[id number] - [place], [place] - [facility] [address] AT [address] & [facility] #[id number] - [place], [place] - [address]    Frailty Screening:   Is the patient here for a new oncology consult visit in cancer care? 2. No      Clinical concerns: none       MEENA PAREDES CMA            "

## 2024-12-03 NOTE — PATIENT INSTRUCTIONS
Continue conservative treatment   Start calcium/vit d 600mg/400IU once a day.  combination pill. Continue to get calcium in diet (try to get 600mg per day).   Weight bearing exercises

## 2024-12-04 DIAGNOSIS — I10 ESSENTIAL HYPERTENSION: ICD-10-CM

## 2024-12-04 RX ORDER — ATENOLOL AND CHLORTHALIDONE TABLET 50; 25 MG/1; MG/1
1 TABLET ORAL DAILY
Qty: 90 TABLET | Refills: 11 | Status: SHIPPED | OUTPATIENT
Start: 2024-12-04

## 2024-12-04 NOTE — PROGRESS NOTES
[facility]: Cancer Care                                                                                            Situation: Patient chart reviewed by care coordinator.    Background: Patient with a diagnosis of invasive ductal carcinoma of the left breast which is ER/NV positive and HER2/sloane negative. She is status post lumpectomy and started anastrozole in the middle of October 2022.     Assessment: Patient comes into the clinic for follow-up with Katlin Cadena CNP after having a DEXA scan prior.  The DEXA scan is now showing osteopenia.  Katlin Cadena CNP asked the patient to start calcium/vit d 600mg/400IU once a day.  combination pill. Continue to get calcium in diet (try to get 600mg per day).  Do  weight bearing exercises.  Patient should follow-up with Dr Rajput in 6 months.    Plan/Recommendations: Patient has been scheduled to see Dr Rajput on 6/5/2025.    Signature:  Suzan Bermudez RN

## 2024-12-26 SDOH — HEALTH STABILITY: PHYSICAL HEALTH: ON AVERAGE, HOW MANY MINUTES DO YOU ENGAGE IN EXERCISE AT THIS LEVEL?: 30 MIN

## 2024-12-26 SDOH — HEALTH STABILITY: PHYSICAL HEALTH: ON AVERAGE, HOW MANY DAYS PER WEEK DO YOU ENGAGE IN MODERATE TO STRENUOUS EXERCISE (LIKE A BRISK WALK)?: 4 DAYS

## 2024-12-26 ASSESSMENT — SOCIAL DETERMINANTS OF HEALTH (SDOH): HOW OFTEN DO YOU GET TOGETHER WITH FRIENDS OR RELATIVES?: ONCE A WEEK

## 2024-12-30 ENCOUNTER — OFFICE VISIT (OUTPATIENT)
Dept: INTERNAL MEDICINE | Facility: CLINIC | Age: 69
End: 2024-12-30
Payer: COMMERCIAL

## 2024-12-30 VITALS
RESPIRATION RATE: 16 BRPM | OXYGEN SATURATION: 99 % | DIASTOLIC BLOOD PRESSURE: 82 MMHG | WEIGHT: 160.1 LBS | BODY MASS INDEX: 25.13 KG/M2 | HEART RATE: 65 BPM | SYSTOLIC BLOOD PRESSURE: 144 MMHG | HEIGHT: 67 IN | TEMPERATURE: 98.1 F

## 2024-12-30 DIAGNOSIS — Z00.00 ROUTINE GENERAL MEDICAL EXAMINATION AT A HEALTH CARE FACILITY: Primary | ICD-10-CM

## 2024-12-30 DIAGNOSIS — D48.9 VILLOUS ADENOMA: ICD-10-CM

## 2024-12-30 DIAGNOSIS — I10 ESSENTIAL HYPERTENSION: ICD-10-CM

## 2024-12-30 DIAGNOSIS — C50.919 MALIGNANT NEOPLASM OF FEMALE BREAST, UNSPECIFIED ESTROGEN RECEPTOR STATUS, UNSPECIFIED LATERALITY, UNSPECIFIED SITE OF BREAST (H): ICD-10-CM

## 2024-12-30 LAB
ALBUMIN SERPL BCG-MCNC: 4.5 G/DL (ref 3.5–5.2)
ALBUMIN UR-MCNC: NEGATIVE MG/DL
ALP SERPL-CCNC: 52 U/L (ref 40–150)
ALT SERPL W P-5'-P-CCNC: 20 U/L (ref 0–50)
ANION GAP SERPL CALCULATED.3IONS-SCNC: 12 MMOL/L (ref 7–15)
APPEARANCE UR: CLEAR
AST SERPL W P-5'-P-CCNC: 27 U/L (ref 0–45)
BACTERIA #/AREA URNS HPF: ABNORMAL /HPF
BILIRUB SERPL-MCNC: 0.4 MG/DL
BILIRUB UR QL STRIP: NEGATIVE
BUN SERPL-MCNC: 20.4 MG/DL (ref 8–23)
CALCIUM SERPL-MCNC: 10.5 MG/DL (ref 8.8–10.4)
CHLORIDE SERPL-SCNC: 96 MMOL/L (ref 98–107)
CHOLEST SERPL-MCNC: 210 MG/DL
COLOR UR AUTO: YELLOW
CREAT SERPL-MCNC: 0.66 MG/DL (ref 0.51–0.95)
EGFRCR SERPLBLD CKD-EPI 2021: >90 ML/MIN/1.73M2
ERYTHROCYTE [DISTWIDTH] IN BLOOD BY AUTOMATED COUNT: 12.3 % (ref 10–15)
FASTING STATUS PATIENT QL REPORTED: ABNORMAL
FASTING STATUS PATIENT QL REPORTED: ABNORMAL
GLUCOSE SERPL-MCNC: 96 MG/DL (ref 70–99)
GLUCOSE UR STRIP-MCNC: NEGATIVE MG/DL
HCO3 SERPL-SCNC: 27 MMOL/L (ref 22–29)
HCT VFR BLD AUTO: 38.8 % (ref 35–47)
HDLC SERPL-MCNC: 65 MG/DL
HGB BLD-MCNC: 13.4 G/DL (ref 11.7–15.7)
HGB UR QL STRIP: ABNORMAL
KETONES UR STRIP-MCNC: NEGATIVE MG/DL
LDLC SERPL CALC-MCNC: 134 MG/DL
LEUKOCYTE ESTERASE UR QL STRIP: NEGATIVE
MCH RBC QN AUTO: 30.7 PG (ref 26.5–33)
MCHC RBC AUTO-ENTMCNC: 34.5 G/DL (ref 31.5–36.5)
MCV RBC AUTO: 89 FL (ref 78–100)
NITRATE UR QL: NEGATIVE
NONHDLC SERPL-MCNC: 145 MG/DL
PH UR STRIP: 5.5 [PH] (ref 5–8)
PLATELET # BLD AUTO: 237 10E3/UL (ref 150–450)
POTASSIUM SERPL-SCNC: 3.9 MMOL/L (ref 3.4–5.3)
PROT SERPL-MCNC: 6.9 G/DL (ref 6.4–8.3)
RBC # BLD AUTO: 4.36 10E6/UL (ref 3.8–5.2)
RBC #/AREA URNS AUTO: ABNORMAL /HPF
SODIUM SERPL-SCNC: 135 MMOL/L (ref 135–145)
SP GR UR STRIP: 1.01 (ref 1–1.03)
SQUAMOUS #/AREA URNS AUTO: ABNORMAL /LPF
TRIGL SERPL-MCNC: 56 MG/DL
TSH SERPL DL<=0.005 MIU/L-ACNC: 0.9 UIU/ML (ref 0.3–4.2)
UROBILINOGEN UR STRIP-ACNC: 0.2 E.U./DL
WBC # BLD AUTO: 6.4 10E3/UL (ref 4–11)
WBC #/AREA URNS AUTO: ABNORMAL /HPF

## 2024-12-30 PROCEDURE — 84443 ASSAY THYROID STIM HORMONE: CPT | Performed by: INTERNAL MEDICINE

## 2024-12-30 PROCEDURE — 99214 OFFICE O/P EST MOD 30 MIN: CPT | Mod: 25 | Performed by: INTERNAL MEDICINE

## 2024-12-30 PROCEDURE — 81001 URINALYSIS AUTO W/SCOPE: CPT | Performed by: INTERNAL MEDICINE

## 2024-12-30 PROCEDURE — G0439 PPPS, SUBSEQ VISIT: HCPCS | Performed by: INTERNAL MEDICINE

## 2024-12-30 PROCEDURE — 36415 COLL VENOUS BLD VENIPUNCTURE: CPT | Performed by: INTERNAL MEDICINE

## 2024-12-30 PROCEDURE — 85027 COMPLETE CBC AUTOMATED: CPT | Performed by: INTERNAL MEDICINE

## 2024-12-30 PROCEDURE — 80061 LIPID PANEL: CPT | Performed by: INTERNAL MEDICINE

## 2024-12-30 PROCEDURE — 80053 COMPREHEN METABOLIC PANEL: CPT | Performed by: INTERNAL MEDICINE

## 2024-12-30 ASSESSMENT — PAIN SCALES - GENERAL: PAINLEVEL_OUTOF10: NO PAIN (0)

## 2024-12-30 NOTE — PROGRESS NOTES
Preventive Care Visit  [facility]  Kaiden Pruitt MD, Internal Medicine  Dec 30, 2024  {Provider  Link to SmartSet :733614}    {PROVIDER CHARTING PREFERENCE:589729}    Abel Foster is a 69 year old, presenting for the following:  Wellness Visit (Calcium 600 mg with D3 - Patient states irritating bladder. )        12/30/2024     3:27 PM   Additional Questions   Roomed by Luis BOND MA     {ROOMER if patient is in their first year of Medicare a vision screen is required click here to document the Vison screen and then refresh the note to pull in results  :602240}      HPI  ***  {MA/LPN/RN Pre-Provider Visit Orders- hCG/UA/Strep (Optional):318743}  {SUPERLIST (Optional):496376}  {additonal problems for provider to add (Optional):208072}  Health Care Directive  Patient does not have a Health Care Directive: {ADVANCE_DIRECTIVE_STATUS:205206}      12/26/2024   General Health   How would you rate your overall physical health? Good   Feel stress (tense, anxious, or unable to sleep) Not at all         12/26/2024   Nutrition   Diet: Regular (no restrictions)    Low salt       Multiple values from one day are sorted in reverse-chronological order         12/26/2024   Exercise   Days per week of moderate/strenous exercise 4 days   Average minutes spent exercising at this level 30 min         12/26/2024   Social Factors   Frequency of gathering with friends or relatives Once a week   Worry food won't last until get money to buy more No   Food not last or not have enough money for food? No   Do you have housing? (Housing is defined as stable permanent housing and does not include staying ouside in a car, in a tent, in an abandoned building, in an overnight shelter, or couch-surfing.) Yes   Are you worried about losing your housing? No   Lack of transportation? No   Unable to get utilities (heat,electricity)? No         12/26/2024   Fall Risk   Fallen 2 or more times in the past year? No    No    Trouble with walking or balance? No    No       Multiple values from one day are sorted in reverse-chronological order          12/26/2024   Activities of Daily Living- Home Safety   Needs help with the following daily activites None of the above   Safety concerns in the home No grab bars in the bathroom         12/26/2024   Dental   Dentist two times every year? Yes         12/26/2024   Hearing Screening   Hearing concerns? (!) I NEED TO ASK PEOPLE TO SPEAK UP OR REPEAT THEMSELVES.    (!) IT'S HARD TO FOLLOW A CONVERSATION IN A NOISY RESTAURANT OR CROWDED ROOM.    (!) TROUBLE UNDERSTANDING SOFT OR WHISPERED SPEECH.       Multiple values from one day are sorted in reverse-chronological order         12/26/2024   Driving Risk Screening   Patient/family members have concerns about driving No         12/26/2024   General Alertness/Fatigue Screening   Have you been more tired than usual lately? No         12/26/2024   Urinary Incontinence Screening   Bothered by leaking urine in past 6 months No         12/26/2024   TB Screening   Were you born outside of the [place]? No         Today's PHQ-2 Score:       12/30/2024     3:11 PM   PHQ-2 ( 1999 Pfizer)   Q1: Little interest or pleasure in doing things 0   Q2: Feeling down, depressed or hopeless 0   PHQ-2 Score 0    Q1: Little interest or pleasure in doing things Not at all   Q2: Feeling down, depressed or hopeless Not at all   PHQ-2 Score 0       Patient-reported           12/26/2024   Substance Use   Alcohol more than 3/day or more than 7/wk Not Applicable   Do you have a current opioid prescription? No   How severe/bad is pain from 1 to 10? 0/10 (No Pain)   Do you use any other substances recreationally? No     Social History     Tobacco Use    Smoking status: Never     Passive exposure: Never    Smokeless tobacco: Never   Vaping Use    Vaping status: Never Used   Substance Use Topics    Alcohol use: Not Currently     Comment: Alcoholic Drinks/day: rare    Drug use: No      {Provider  If there are gaps in the social history shown above, please follow the link to update and then refresh the note Link to Social and Substance History :332965}      8/21/2024   LAST FHS-7 RESULTS   1st degree relative breast or ovarian cancer Yes   Any relative bilateral breast cancer No   Any male have breast cancer No   Any ONE woman have BOTH breast AND ovarian cancer No   Any woman with breast cancer before 50yrs Yes   2 or more relatives with breast AND/OR ovarian cancer No   2 or more relatives with breast AND/OR bowel cancer Yes     {If any of the questions to the FHS7 are answered yes, consider referral for genetic counseling.    Additional indications for genetic referral include personal history of breast or ovarian cancer, genetic mutation in 1st degree relative which increases risk of breast cancer including BRCA1, BRCA2, LUANNE, PALB 2, TP53, CHEK2, PTEN, CDH1, STK11 (per ACS) and/or 1st degree relative with history of pancreatic or high-risk prostate cancer (per NCCN):583825}   {Mammogram Decision Support (Optional):289861}      History of abnormal Pap smear: { :186091}       ASCVD Risk   The 10-year ASCVD risk score (Milly WYNN, et al., 2019) is: 13.9%    Values used to calculate the score:      Age: 69 years      Sex: Female      Is Non- : No      Diabetic: No      Tobacco smoker: No      Systolic Blood Pressure: 144 mmHg      Is BP treated: Yes      HDL Cholesterol: 60 mg/dL      Total Cholesterol: 200 mg/dL    {Link to Fracture Risk Assessment Tool (Optional):071298}    {Provider  REQUIRED FOR AWV Use the storyboard to review patient history, after sections have been marked as reviewed, refresh note to capture documentation:739993}  {Provider   REQUIRED AWV use this link to review and update sexual activity history  after section has been marked as reviewed, refresh note to capture documentation:380599}  Reviewed and updated as needed this visit by  "Provider                    {HISTORY OPTIONS (Optional):810146}  Current providers sharing in care for this patient include:  Patient Care Team:  Kaiden Pruitt MD as PCP - General (Internal Medicine)  Kaiden Pruitt MD as Assigned PCP  Anastasia Allen MD as Assigned Surgical Provider  Suzan Bermudez, RN as Specialty Care Coordinator (Hematology & Oncology)  Quincy Rajput MD as Assigned Cancer Care Provider    The following health maintenance items are reviewed in Epic and correct as of today:  Health Maintenance   Topic Date Due    ANNUAL REVIEW OF HM ORDERS  Never done    ADVANCE CARE PLANNING  Never done    HEPATITIS C SCREENING  Never done    Pneumococcal Vaccine: 50+ Years (1 of 2 - PCV) Never done    RSV VACCINE (1 - Risk 60-74 years 1-dose series) Never done    MEDICARE ANNUAL WELLNESS VISIT  08/24/2024    COVID-19 Vaccine (6 - 2024-25 season) 09/01/2024    MAMMO SCREENING  08/21/2025    BMP  12/03/2025    FALL RISK ASSESSMENT  12/30/2025    GLUCOSE  12/03/2027    COLORECTAL CANCER SCREENING  12/07/2027    LIPID  08/24/2028    DTAP/TDAP/TD IMMUNIZATION (3 - Td or Tdap) 11/27/2030    DEXA  11/12/2039    PHQ-2 (once per calendar year)  Completed    INFLUENZA VACCINE  Completed    ZOSTER IMMUNIZATION  Completed    HPV IMMUNIZATION  Aged Out    MENINGITIS IMMUNIZATION  Aged Out    RSV MONOCLONAL ANTIBODY  Aged Out       {ROS Picklists (Optional):780157}     Objective    Exam  BP (!) 144/82   Pulse 65   Temp 98.1  F (36.7  C) (Oral)   Resp 16   Ht 1.708 m (5' 7.24\")   Wt 72.6 kg (160 lb 1.6 oz)   LMP  (LMP Unknown)   SpO2 99%   BMI 24.89 kg/m     Estimated body mass index is 24.89 kg/m  as calculated from the following:    Height as of this encounter: 1.708 m (5' 7.24\").    Weight as of this encounter: 72.6 kg (160 lb 1.6 oz).    Physical Exam  {Exam Choices (Optional):487635}         12/30/2024   Mini Cog   Clock Draw Score 2 Normal   3 Item Recall 3 objects recalled   Mini Cog Total " Score 5     {A Mini-Cog total score of 0-2 suggests the possibility of dementia, score of 3-5 suggests no dementia:273641}         Signed Electronically by: Kaiden Pruitt MD  {Email feedback regarding this note to [email or web address]   :987756}

## 2024-12-30 NOTE — PROGRESS NOTES
Annual Wellness Visit:  Kristin Almanzar  is a 69 year old female  who presents for an annual wellness visit.  Physician and patient sharing was accomplished today.  I do not prescribe this patient opioids.  The patient's provider list includes myself ASHLEIGH is her primary care general internist and PCP.  Patient is also seen by Dr. MOHAN Rajput from oncology.  History of breast cancer and history of villous adenoma of the colon.  Breast cancer no sign of recurrence status postlumpectomy.  See below.    Cognitive assessment was done the patient was able to draw the face of an analog clock and remember 3 items.  Labs ordered today include hemogram comprehensive metabolic profile lipid panel TSH urinalysis.  Emotional mental health normal functional capacity ADLs assessed and all okay along with safety in the home.    Advance care planning done.    Falls risk assessment also accomplished.    Cognitive assessment was completed and the current provider this examiner and patient sharing was also completed.  Assessment/Plan:  (Z00.00) Routine general medical examination at a health care facility  (primary encounter diagnosis)  Comment: General Medical examination physical examination was done today.  Plan: Comprehensive metabolic panel, Lipid panel         reflex to direct LDL Fasting, UA Macroscopic         with reflex to Microscopic and Culture        Breast examination done by gynecology service and/or Dr. Rajput from hematology oncology.  The patient is status post hysterectomy for benign disease 2017.  Obviating her Pap smear.    (C50.919) Malignant neoplasm of female breast, unspecified estrogen receptor status, unspecified laterality, unspecified site of breast (H)  Comment: History of lumpectomy and now on Arimidex.  Thankfully no sign of recurrence of breast cancer.  Plan: CBC with platelets        Stable    (D48.9) Villous adenoma  Comment: Status post partial right colectomy with ileal transverse anastomosis by Dr. BARR  Edwardo of colorectal surgery group  Plan: Suggest continue close follow-up with [facility] colorectal surgery group Dr. Leah Mcknight names given at [phone].    (I10) Essential hypertension  Comment: Controlled stable mild systolic elevation at 144/82.  Plan: TSH        Reemphasized salt restriction diet regular exercise and continuation of same medications as outlined in the chart Tenoretic and lisinopril and potassium supplement.  Salt avoidance is also advised.        Subjective:   Medical History:    Non-smoker no excess alcohol.  Allergy amoxicillin and Darvocet or Darvon or propoxyphene.    Breast cancer lumpectomy 2022 no recurrence followed by Dr. Rajput from oncology.  In 2013 colectomy was done for villous adenoma.   Colorectal surgery group Dr. STAHL presiding.  Open colectomy ileal transverse anastomosis.  No recurrence of problems colonoscopy every 2 to 3 years as recommended by colorectal surgery.  The patient was given the name of Dr. Leah Mcknight there to see.  [phone] for periodic colonoscopy.  No loose stools.    Last colonoscopy 2022.    Right total hip arthroplasty for osteoarthritis Dr. MATHEW [place] [place] [facility].  Hysterectomy for benign disease 2017.  Hypertension without target organ damage related to same.  Controlled see above today 144/82.  Past Medical History:   Diagnosis Date    Arthritis last summer    right hip only    Cancer (H) June 2022    History of anesthesia complications     vinethane - slow to wake up    Hypertension     PONV (postoperative nausea and vomiting)     as a child had vinethane slow to awake     Current Outpatient Medications   Medication Sig Dispense Refill    anastrozole (ARIMIDEX) 1 MG tablet Take 1 tablet (1 mg) by mouth daily. 90 tablet 3    atenolol-chlorthalidone (TENORETIC) 50-25 MG tablet TAKE ONE TABLET BY MOUTH ONCE DAILY 90 tablet 11    clindamycin (CLEOCIN) 300 MG capsule TAKE 2 CAPSULES BY MOUTH 1 HOUR BEFORE DENTAL WORK      lisinopril  (ZESTRIL) 10 MG tablet Take 1 tablet (10 mg) by mouth daily 90 tablet 11    potassium chloride ren ER (KLOR-CON M20) 20 MEQ CR tablet Take 1 tablet (20 mEq) by mouth daily 90 tablet 1     No current facility-administered medications for this visit.     Immunization History   Administered Date(s) Administered    COVID-19 MONOVALENT 12+ (Pfizer) 03/09/2021, 03/09/2021, 04/06/2021, 10/26/2021    COVID-19 Monovalent 12+ (Pfizer 2022) 05/09/2022    DT (PEDS <7y) 06/30/1999    Flu, Unspecified 10/26/2020, 11/11/2021    Influenza (High Dose) Trivalent,PF (Fluzone) 11/18/2024    Influenza (prior to 2024) 10/31/2014, 11/06/2015, 12/07/2016    Influenza Vaccine 65+ (FLUAD) 11/07/2023    Influenza Vaccine 65+ (Fluzone HD) 10/26/2020, 11/11/2021, 11/16/2022    Influenza Vaccine >6 months,quad, PF 11/14/2019    Influenza, Split Virus, Trivalent, Pf (Fluzone\Fluarix) 10/31/2014, 11/06/2015, 12/07/2016    Influenza,INJ,MDCK,PF,Quad >6mo(Flucelvax) 11/11/2017    TDAP (Adacel,Boostrix) 10/14/2010, 11/27/2020    Td,adult,historic,unspecified 06/30/1999    Twinrix A/B 04/30/2024, 06/03/2024, 11/07/2024    Zoster recombinant adjuvanted (SHINGRIX) 03/11/2024, 08/26/2024       Surgical History:  Past Surgical History:   Procedure Laterality Date    ABDOMEN SURGERY  August 2013    colectomy-rt. roxie    APPENDECTOMY      BIOPSY      COLON SURGERY Right     Colectomy - Large polyp    COLONOSCOPY      COLPORRHAPHY N/A 06/27/2017    Procedure:  UTEROSACRAL LIGAMENT SUSPENSION CYSTOSCOPY;  Surgeon: Nancy Girard MD;  Location: [facility];  Service:     EYE SURGERY Left     Cataract    HYSTERECTOMY  06/27/2017    HYSTERECTOMY VAGINAL N/A 06/27/2017    Procedure: VAGINAL HYSTERECTOMY;  Surgeon: Nancy Girard MD;  Location: [facility];  Service:     LUMPECTOMY BREAST Left 07/27/2022    Procedure: Left Lumpectpmy; [place] Lymph Node Biopsy;  Surgeon: Anastasia Allen MD;  Location: [facility]    ORTHOPEDIC SURGERY   "2024        Family History:  Mother  86 after a CNS CVA bleed.    Father  82:Colon cancer.    Social History:  Presents on a regular basis now at [employer].    Health Maintenances:  Immunizations vaccines reviewed and up-to-date.  Mammographic testing through hematology oncology service Dr. MOHAN Rajput or associate.    Objective:  BP (!) 144/82   Pulse 65   Temp 98.1  F (36.7  C) (Oral)   Resp 16   Ht 1.708 m (5' 7.24\")   Wt 72.6 kg (160 lb 1.6 oz)   LMP  (LMP Unknown)   SpO2 99%   BMI 24.89 kg/m    Easily conversant good spirited not in acute distress.  Intelligent soft-spoken.  Chest clear heart tones normal abdomen benign extremities free of edema cyanosis or clubbing neck veins nondistended there is a goiter present symmetric without nodularity.  TSH level pending no carotid bruits no thyroid nodules no lymphadenopathy appreciated.  Areas back straight no severe spine tenderness chest clear heart tones normal abdomen benign soft-spoken easily conversant good spirited highly intelligent.  Pleasant.  Neatly attired.    We had a good examination and a good discussion today.  Encouraged patient to discuss with family the need for screening examinationsOffice Visit - Physical    Kristin DAVIS Jessicajim   69 year old female    Date of Visit: 2024    Chief Complaint   Patient presents with    Wellness Visit     Calcium 600 mg with D3 - Patient states irritating bladder.            ROS: A comprehensive review of systems was performed and was otherwise negative    Medications:   Prior to Admission medications    Medication Sig Start Date End Date Taking? Authorizing Provider   anastrozole (ARIMIDEX) 1 MG tablet Take 1 tablet (1 mg) by mouth daily. 12/3/24  Yes Katlin Cadena APRN CNP   atenolol-chlorthalidone (TENORETIC) 50-25 MG tablet TAKE ONE TABLET BY MOUTH ONCE DAILY 24  Yes Kaiden Pruitt MD   clindamycin (CLEOCIN) 300 MG capsule TAKE 2 CAPSULES BY MOUTH 1 HOUR BEFORE DENTAL " WORK 10/7/24  Yes Reported, Patient   lisinopril (ZESTRIL) 10 MG tablet Take 1 tablet (10 mg) by mouth daily 3/12/24  Yes Kaiden Pruitt MD   potassium chloride ren ER (KLOR-CON M20) 20 MEQ CR tablet Take 1 tablet (20 mEq) by mouth daily 3/12/24  Yes Kaiden Pruitt MD       Allergies:  Allergies   Allergen Reactions    Amoxicillin Rash    Propoxyphene Rash     Red blotches on face       Immunizations:   Immunization History   Administered Date(s) Administered    COVID-19 MONOVALENT 12+ (Pfizer) 03/09/2021, 03/09/2021, 04/06/2021, 10/26/2021    COVID-19 Monovalent 12+ (Pfizer 2022) 05/09/2022    DT (PEDS <7y) 06/30/1999    Flu, Unspecified 10/26/2020, 11/11/2021    Influenza (High Dose) Trivalent,PF (Fluzone) 11/18/2024    Influenza (prior to 2024) 10/31/2014, 11/06/2015, 12/07/2016    Influenza Vaccine 65+ (FLUAD) 11/07/2023    Influenza Vaccine 65+ (Fluzone HD) 10/26/2020, 11/11/2021, 11/16/2022    Influenza Vaccine >6 months,quad, PF 11/14/2019    Influenza, Split Virus, Trivalent, Pf (Fluzone\Fluarix) 10/31/2014, 11/06/2015, 12/07/2016    Influenza,INJ,MDCK,PF,Quad >6mo(Flucelvax) 11/11/2017    TDAP (Adacel,Boostrix) 10/14/2010, 11/27/2020    Td,adult,historic,unspecified 06/30/1999    Twinrix A/B 04/30/2024, 06/03/2024, 11/07/2024    Zoster recombinant adjuvanted (SHINGRIX) 03/11/2024, 08/26/2024         MD Kaiden Case MD    Internal Medicine

## 2025-01-02 ENCOUNTER — VIRTUAL VISIT (OUTPATIENT)
Dept: INTERNAL MEDICINE | Facility: CLINIC | Age: 70
End: 2025-01-02
Payer: COMMERCIAL

## 2025-01-02 DIAGNOSIS — E83.52 HYPERCALCEMIA: Primary | ICD-10-CM

## 2025-01-02 DIAGNOSIS — C50.911 MALIGNANT NEOPLASM OF RIGHT FEMALE BREAST, UNSPECIFIED ESTROGEN RECEPTOR STATUS, UNSPECIFIED SITE OF BREAST (H): ICD-10-CM

## 2025-01-02 NOTE — PROGRESS NOTES
Kristin Almanzar is a 69 year oldwho is being evaluated via a billable telephone visit.       What phone number would you like to be contacted at? [phone]      Assessment & Plan    (C50.911) Malignant neoplasm of right female breast, unspecified estrogen receptor status, unspecified site of breast (H)  Comment: Followed by Dr. Rajput there is been no sign of recurrence of the breast cancer.  Currently on Arimidex.  She is status post lumpectomy.  Plan: Mild serum calcium level was noted at 10.5 she was asked to stop the calcium supplement continue vitamin D3 not to exceed 2000 international units/day and recheck serum calcium with fasting lipid panel 4 months.  Phone visit prior.        Mild hyperlipidemia noted but not fasting.  Trace blood in the urine dipstick but microscopic urine all clear no microscopic hematuria noted.  Patient reassured.  Plan: Recheck serum calcium and lipid panel fasting in 4 months time phone visit prior.    We tried to do a video visit today but was not able to do as she could not set up her computer to accomplish this.  The patient preferred a phone visit instead of going through the vaccinations of setting up a phone visit on her part.    The patient was in her [facility] home at the time of this phone visit and I was here at the [facility] in [facility].         15 minutes spent on the date of the encounter doing chart review, patient visit and documentation I spoke with the patient directly on the phone for 11 minutes.       Subjective  Generally feeling well here in follow-up regarding the minor Abir ration seen on lab testing as part of her annual wellness visit and physical examination.  Dipstick was positive for blood but urine microscopic was all clear with 0-2 red cells per high-power field.    Serum calcium was slightly elevated at 10.5 previous to that it has been normal.  Under the direction of her oncologist and because she is on Arimidex the  patient has been on calcium supplements with vitamin D3.  She was asked to stop the calcium supplement at this time and recheck serum calcium in 4 months time.    In addition the patient has her annual wellness visit done and this was done with her nonfasting.  There was a mild elevation in her lipid panel noted with a total cholesterol 2 2 but normal LDL cholesterol and a slight elevation in .  Next visit 4 months time fasting should be lipid panel rechecked.  Fasting     Review of Systems   We had a good discussion no blood in stool or urine no chest pain or shortness of breath no bone pain.  Prior history of breast cancer without evidence for metastasis.   was in the background.  Medication list reviewed reconciled in the chart.       Kaiden Pruitt MD    The longitudinal plan of care for the diagnosis(es)/condition(s) as documented were addressed during this visit. Due to the added complexity in care, I will continue to support Kristin in the subsequent management and with ongoing continuity of care.

## 2025-01-30 ENCOUNTER — VIRTUAL VISIT (OUTPATIENT)
Dept: INTERNAL MEDICINE | Facility: CLINIC | Age: 70
End: 2025-01-30
Payer: COMMERCIAL

## 2025-01-30 DIAGNOSIS — M62.89 PELVIC FLOOR DYSFUNCTION: Primary | ICD-10-CM

## 2025-01-30 PROBLEM — C50.412 MALIGNANT NEOPLASM OF UPPER-OUTER QUADRANT OF LEFT BREAST IN FEMALE, ESTROGEN RECEPTOR POSITIVE (H): Status: RESOLVED | Noted: 2022-07-20 | Resolved: 2025-01-30

## 2025-01-30 PROBLEM — Z17.0 MALIGNANT NEOPLASM OF UPPER-OUTER QUADRANT OF LEFT BREAST IN FEMALE, ESTROGEN RECEPTOR POSITIVE (H): Status: RESOLVED | Noted: 2022-07-20 | Resolved: 2025-01-30

## 2025-01-30 NOTE — PROGRESS NOTES
Kristin Almanzar is a 69 year oldwho is being evaluated via a billable telephone visit.       What phone number would you like to be contacted at? [phone]      Assessment & Plan  (M62.89) Pelvic floor dysfunction  (primary encounter diagnosis)  Comment: Vaginal prolapse.  Plan: Dr. Donavan Rabago [place] or Dr. Eddie Huggins [facility] suggested         12 minutes spent on the date of the encounter doing chart review, patient visit and documentation spoke with the patient directly on the phone 10 minutes.  I spoke with the patient directly on the phone and I was in the [facility] and she was in her [facility] home today.  We had a good discussion.       Subjective  Prior surgery includes hysterectomy done previously by Dr. Damon now retired gynecologist.     Review of Systems   No blood in stool or urine med list reviewed reconciled.       Kaiden Pruitt MD    The longitudinal plan of care for the diagnosis(es)/condition(s) as documented were addressed during this visit. Due to the added complexity in care, I will continue to support Kristin in the subsequent management and with ongoing continuity of care.

## 2025-02-06 ENCOUNTER — TELEPHONE (OUTPATIENT)
Dept: ONCOLOGY | Facility: HOSPITAL | Age: 70
End: 2025-02-06
Payer: COMMERCIAL

## 2025-02-06 DIAGNOSIS — C50.412 MALIGNANT NEOPLASM OF UPPER-OUTER QUADRANT OF LEFT BREAST IN FEMALE, ESTROGEN RECEPTOR POSITIVE (H): ICD-10-CM

## 2025-02-06 DIAGNOSIS — N81.10 VAGINAL PROLAPSE: Primary | ICD-10-CM

## 2025-02-06 DIAGNOSIS — Z17.0 MALIGNANT NEOPLASM OF UPPER-OUTER QUADRANT OF LEFT BREAST IN FEMALE, ESTROGEN RECEPTOR POSITIVE (H): ICD-10-CM

## 2025-02-06 DIAGNOSIS — Z79.811 LONG TERM (CURRENT) USE OF AROMATASE INHIBITORS: ICD-10-CM

## 2025-02-06 NOTE — TELEPHONE ENCOUNTER
I received a phone call today from Kristin. She is calling with some questions for Dr. Rajput or Katlin Cadena CNP. She states she has been dealing with a vaginal prolapse since about the end of November. She has been seeing a GYN provider for this. She has the follow questions for our team:     1) Could arimidex be causing her vaginal issues? If yes, should she consider a new medication?     2) Could she get referred to a Urology-GYN provider in our system?     3) Could she try an estrogen based vaginal cream (per the recommendations of her GYN)?     Kristin is willing to set up a virtual visit with Katlin Cadena CNP if that is easier. I let her know I would send these questions to FARRUKH Dalal to review and advise. She can be reached at [phone].     Lis Palacios RN on 2/6/2025 at 3:12 PM

## 2025-02-06 NOTE — TELEPHONE ENCOUNTER
Per Katlin Cadena, CNP -      1) Could arimidex be causing her vaginal issues? If yes, should she consider a new medication? Yes but there isn't another medication that wouldn't cause the same symptoms.      2) Could she get referred to a Urology-GYN provider in our system? Yes, referral placed.      3) Could she try an estrogen based vaginal cream (per the recommendations of her GYN)? Yes, she can try a low dose estrogen.     I called Kristin back with this information today. She verbalized understanding and has no other questions at this time.     Lis Palacios RN on 2/6/2025 at 4:28 PM

## 2025-02-17 ENCOUNTER — TRANSFERRED RECORDS (OUTPATIENT)
Dept: HEALTH INFORMATION MANAGEMENT | Facility: CLINIC | Age: 70
End: 2025-02-17
Payer: COMMERCIAL

## 2025-02-25 NOTE — TELEPHONE ENCOUNTER
MEDICAL RECORDS REQUEST   [facility]  Urology Clinic  [address] [place] [address]  PHONE: [phone]  Fax: [phone]        FUTURE VISIT INFORMATION                                                   Kristin Almanzar, : 1955 scheduled for future visit at [facility]    APPOINTMENT INFORMATION:  Date: 2025  Provider:  Beverly Hooks MD  Reason for Visit/Diagnosis: Vaginal prolapse    REFERRAL INFORMATION:  Referring provider:  Katlin Cadena APRN CNP in [facility] MEDICAL ONCOLOGY    RECORDS REQUESTED FOR VISIT                                                     NOTES  STATUS/DETAILS   OFFICE NOTE from referring provider  2024 -- Katlin Cadena APRN CNP in [facility] MEDICAL ONCOLOGY   OFFICE NOTE from other specialist MEDIA TAB YES, 2025 -- YOVANI CARY [facility] @ [facility]   MEDICATION LIST  YES   LABS     URINALYSIS (UA)  yes   URINE CYTOLOGY  yes, 2022 --CT CHEST ABD PELVIS  2022 -- PET ONCOLOGY     PRE-VISIT CHECKLIST      Joint diagnostic appointment coordinated correctly          (ensure right order & amount of time) Yes   RECORD COLLECTION COMPLETE Yes

## 2025-03-01 DIAGNOSIS — I10 ESSENTIAL HYPERTENSION: ICD-10-CM

## 2025-03-03 RX ORDER — POTASSIUM CHLORIDE 1500 MG/1
20 TABLET, EXTENDED RELEASE ORAL DAILY
Qty: 90 TABLET | Refills: 1 | Status: SHIPPED | OUTPATIENT
Start: 2025-03-03

## 2025-03-06 ENCOUNTER — TRANSFERRED RECORDS (OUTPATIENT)
Dept: HEALTH INFORMATION MANAGEMENT | Facility: CLINIC | Age: 70
End: 2025-03-06
Payer: COMMERCIAL

## 2025-05-01 ENCOUNTER — TRANSFERRED RECORDS (OUTPATIENT)
Dept: HEALTH INFORMATION MANAGEMENT | Facility: CLINIC | Age: 70
End: 2025-05-01
Payer: COMMERCIAL

## 2025-05-08 ENCOUNTER — PRE VISIT (OUTPATIENT)
Dept: UROLOGY | Facility: CLINIC | Age: 70
End: 2025-05-08

## 2025-05-19 ENCOUNTER — TELEPHONE (OUTPATIENT)
Dept: ONCOLOGY | Facility: HOSPITAL | Age: 70
End: 2025-05-19
Payer: COMMERCIAL

## 2025-05-19 NOTE — TELEPHONE ENCOUNTER
[facility] Rajput due to provider being out of clinic

## 2025-06-25 ENCOUNTER — PATIENT OUTREACH (OUTPATIENT)
Dept: INTERNAL MEDICINE | Facility: CLINIC | Age: 70
End: 2025-06-25
Payer: COMMERCIAL

## 2025-06-25 NOTE — PROGRESS NOTES
Patient Quality Outreach    Patient is due for the following:   Hypertension -  BP check    Action(s) Taken:   If patient calls back, schedule a nurse only visit for BP Check and if agreeable schedule AWV anytime after 12/30     Type of outreach:    Sent [employer] message.    Questions for provider review:    None         Mary Ellen Rosario  Chart routed to None.

## 2025-06-25 NOTE — PROGRESS NOTES
June 25, 2025    Kristin Almanzar    [address] [place] [address]    Hello,     Your care team at [facility] values your health and well-being. After reviewing your chart, we have identified recommendation(s) to help you better manage your health.    It's time for a follow-up visit to manage your Hypertension    If you recently had or are having any of these services soon, please contact the clinic via phone or [email or web address] so that your care team can update your records.    We look forward to seeing you at your upcoming visit.    If you have any questions or concerns, please contact our clinic. Thank you for continuing to trust us with your care.    Sincerely,    Your [facility] Care Team

## 2025-07-02 ENCOUNTER — ONCOLOGY VISIT (OUTPATIENT)
Dept: ONCOLOGY | Facility: HOSPITAL | Age: 70
End: 2025-07-02
Attending: INTERNAL MEDICINE
Payer: COMMERCIAL

## 2025-07-02 VITALS
HEIGHT: 67 IN | TEMPERATURE: 97.9 F | BODY MASS INDEX: 23.07 KG/M2 | RESPIRATION RATE: 16 BRPM | DIASTOLIC BLOOD PRESSURE: 65 MMHG | WEIGHT: 147 LBS | SYSTOLIC BLOOD PRESSURE: 145 MMHG | HEART RATE: 57 BPM | OXYGEN SATURATION: 98 %

## 2025-07-02 DIAGNOSIS — C50.412 MALIGNANT NEOPLASM OF UPPER-OUTER QUADRANT OF LEFT BREAST IN FEMALE, ESTROGEN RECEPTOR POSITIVE (H): Primary | ICD-10-CM

## 2025-07-02 DIAGNOSIS — Z17.0 MALIGNANT NEOPLASM OF UPPER-OUTER QUADRANT OF LEFT BREAST IN FEMALE, ESTROGEN RECEPTOR POSITIVE (H): Primary | ICD-10-CM

## 2025-07-02 DIAGNOSIS — Z79.811 LONG TERM (CURRENT) USE OF AROMATASE INHIBITORS: ICD-10-CM

## 2025-07-02 PROCEDURE — G0463 HOSPITAL OUTPT CLINIC VISIT: HCPCS | Performed by: INTERNAL MEDICINE

## 2025-07-02 PROCEDURE — G2211 COMPLEX E/M VISIT ADD ON: HCPCS | Performed by: INTERNAL MEDICINE

## 2025-07-02 PROCEDURE — 99214 OFFICE O/P EST MOD 30 MIN: CPT | Performed by: INTERNAL MEDICINE

## 2025-07-02 ASSESSMENT — PAIN SCALES - GENERAL: PAINLEVEL_OUTOF10: NO PAIN (0)

## 2025-07-02 NOTE — LETTER
"7/2/2025      Kristin Almanzar  [address] [place] [address]      Dear Colleague,    Thank you for referring your patient, Kristin Almanzar, to the [facility]. Please see a copy of my visit note below.    Oncology Rooming Note    July 2, 2025 10:25 AM   Kristin Almanzar is a 69 year old female who presents for:    Chief Complaint   Patient presents with     Oncology Clinic Visit     Return visit 6 months. Malignant neoplasm of upper-outer quadrant of left breast in female, estrogen receptor positive.     Initial Vitals: BP (!) 145/65 (BP Location: Left arm, Patient Position: Sitting, Cuff Size: Adult Regular)   Pulse 57   Temp 97.9  F (36.6  C) (Tympanic)   Resp 16   Ht 1.702 m (5' 7\")   Wt 66.7 kg (147 lb)   LMP  (LMP Unknown)   SpO2 98%   BMI 23.02 kg/m   Estimated body mass index is 23.02 kg/m  as calculated from the following:    Height as of this encounter: 1.702 m (5' 7\").    Weight as of this encounter: 66.7 kg (147 lb). Body surface area is 1.78 meters squared.  No Pain (0) Comment: Data Unavailable   No LMP recorded (lmp unknown). Patient has had a hysterectomy.  Allergies reviewed: Yes  Medications reviewed: Yes    Medications: MEDICATION REFILLS NEEDED TODAY. Provider was notified.  Pharmacy name entered into [employer]:    [id number] - [address] AT [facility] [address] & [facility] MA[facility] ORDER #[id number] - [address]    Frailty Screening:   Is the patient here for a new oncology consult visit in cancer care? 2. No    PHQ9:  Did this patient require a PHQ9?: No      Clinical concerns:       MEENA PAREDES CMA              [facility] Hematology and Oncology Consult Note    Patient: Kristin Almanzar  MRN: [id number]  Date of Service: Jul 2, 2025       Reason for consultation      Problem List Items Addressed This Visit          Oncology    Malignant neoplasm of upper-outer " quadrant of left breast in female, estrogen receptor positive (H) - Primary       Other    Long term (current) use of aromatase inhibitors          Assessment / number of problems addressed      1.  A very pleasant 69 year old woman with invasive ductal carcinoma left breast T2 N1 M0 ER/NC positive HER2/sloane negative status postlumpectomy.  Started on anastrozole in the middle of October 2022.  So far seems to be tolerating it well.  Slight hyponatremia and musculoskeletal side effects.  2.  Oncotype score of 16.  Normal bone density in November 2022.  3.  Good general health otherwise.  4.  Some concern regarding endocrine therapy.  5.  Some degree of anxiety.  6.  Slight vaginal dryness secondary to anastrozole.  She is also noticing some joint stiffness typical of aromatase inhibitor therapy.  7.  Enlarged thyroid gland.  She sees an endocrinologist for that.  8.  Right hip osteoarthritis.  Had surgery in June 2024.  Did well.    Plan and medical decision making      Presenting with moderate side effects including osteopenia and recent fracture of the left fifth metatarsal.  Reviewed labs.  Personally reviewed the images of the bone density.  Ordered treatment with anastrozole.    At this time treatment with anastrozole 1 mg p.o. daily.  Kristin will stay on this treatment until October 2027.  Due to recent fracture of left fifth metatarsal recommend increasing dose of calcium and vitamin D in her diet.  Next bone density sometime in November 2026  Also talked about the management of vaginal dryness.  Using over-the-counter moisturizing agents including coconut oil, Replens etc. should be used.  If it gets worse then small dose of estradiol cream can also be applied for finite duration of time.  Mammogram in August 2025.  The longitudinal plan of care for the diagnosis(es)/condition(s) as documented were addressed during this visit. Due to the added complexity in care, I will continue to support Kristin in the  "subsequent management and with ongoing continuity of care.    Clinical/pathological stage       Cancer Staging   No matching staging information was found for the patient.      History of present illness      Ms. Kristin Almanzar is a 69 year old  referred to us for evaluation of left-sided breast cancer diagnosed in July 2022 when she presented for mammogram in June 2022.  The mammogram was abnormal.  She had some additional studies and then a biopsy.  The biopsy confirmed invasive ductal carcinoma.  She underwent surgery on 27th July 2022.  She had lumpectomy with sentinel lymph node biopsy done.  She had 24 mm invasive ductal carcinoma removed.  [place] lymph nodes was positive  with a 6 mm deposit.  Focal extracapsular extension.  Margins were negative.    She also had an Oncotype score done.  The Oncotype score is 16.    Besides hypertension she has been very healthy person.  Dr. Obrien is her primary care doctor.    Finished radiation in October 2022.  Started on anastrozole after that.  So far seems to be tolerating it okay.  Did do a bone density before starting and it was normal.    Had a visit with Dr. Allen in August 2023 which went fine.  Breast exam was normal.  Her last mammogram was normal as well.      Seen in December 2024.  Exam and findings were normal.  Continue to have some issues with vaginal dryness.    Recently was in vacation in [place] and twisted her foot and found out that she had broken the fifth metatarsal on the left foot.  She is currently in a boot.  Also noticing small lump on her left breast area.    Review of system      Details noted in the history of present illness.  A detailed review of systems is otherwise negative.      Physical exam        BP (!) 145/65 (BP Location: Left arm, Patient Position: Sitting, Cuff Size: Adult Regular)   Pulse 57   Temp 97.9  F (36.6  C) (Tympanic)   Resp 16   Ht 1.702 m (5' 7\")   Wt 66.7 kg (147 lb)   LMP  (LMP Unknown)   SpO2 98%   BMI " 23.02 kg/m      GENERAL: No acute distress. Cooperative in conversation.   HEENT:  Pupils are equal, round and reactive. Oral mucosa is clean and intact. No ulcerations or mucositis noted. No bleeding noted.  Large thyroid noted.  RESP:Chest symmetric lungs are clear bilaterally per auscultation. Regular respiratory rate. No wheezes or rhonchi.  CV: Normal S1 S2 Regular, rate and rhythm.     ABD: Nondistended, soft, nontender. Positive bowel sounds. No organomegaly.   EXTREMITIES: No lower extremity edema. Left foot in a boot.  NEURO: Non- focal. Alert and oriented x3.  Cranial nerves appear intact.  PSYCH: Within normal limits. No depression or anxiety.  SKIN: Warm dry intact.  BREAST: Bilateral breast examination was done.  Left breast has well-healed incision.  On the medial side of the incision there is some nodularity felt which appears to be mostly postoperative suture nodularity rather than anything unusual.  No other worrisome lump noted in the rest of the breast.  No nipple discharge.  Right breast is normal in exam.  Bilateral axillary examination negative for any lymphadenopathy.      Lab results Reviewed      No results found for this or any previous visit (from the past week).    Imaging results Reviewed        No results found.      Signed by: Quincy Rajput MD      This note has been dictated using voice recognition software. Any grammatical or context distortions are unintentional and inherent to the software     Again, thank you for allowing me to participate in the care of your patient.        Sincerely,        Quincy Rajput MD    Electronically signed

## 2025-07-02 NOTE — PROGRESS NOTES
"Oncology Rooming Note    July 2, 2025 10:25 AM   Kristin Almanzar is a 69 year old female who presents for:    Chief Complaint   Patient presents with    Oncology Clinic Visit     Return visit 6 months. Malignant neoplasm of upper-outer quadrant of left breast in female, estrogen receptor positive.     Initial Vitals: BP (!) 145/65 (BP Location: Left arm, Patient Position: Sitting, Cuff Size: Adult Regular)   Pulse 57   Temp 97.9  F (36.6  C) (Tympanic)   Resp 16   Ht 1.702 m (5' 7\")   Wt 66.7 kg (147 lb)   LMP  (LMP Unknown)   SpO2 98%   BMI 23.02 kg/m   Estimated body mass index is 23.02 kg/m  as calculated from the following:    Height as of this encounter: 1.702 m (5' 7\").    Weight as of this encounter: 66.7 kg (147 lb). Body surface area is 1.78 meters squared.  No Pain (0) Comment: Data Unavailable   No LMP recorded (lmp unknown). Patient has had a hysterectomy.  Allergies reviewed: Yes  Medications reviewed: Yes    Medications: MEDICATION REFILLS NEEDED TODAY. Provider was notified.  Pharmacy name entered into [employer]:    [id number] - [place], [place] - [facility] [address] AT [address] & [facility] MAIL ORDER #[id number] - [place] - [facility] [address]    Frailty Screening:   Is the patient here for a new oncology consult visit in cancer care? 2. No    PHQ9:  Did this patient require a PHQ9?: No      Clinical concerns:       MEENA PAREDES CMA            "

## 2025-07-02 NOTE — PROGRESS NOTES
[facility] Hematology and Oncology Consult Note    Patient: Kristin Almaznar  MRN: [id number]  Date of Service: Jul 2, 2025       Reason for consultation      Problem List Items Addressed This Visit          Oncology    Malignant neoplasm of upper-outer quadrant of left breast in female, estrogen receptor positive (H) - Primary       Other    Long term (current) use of aromatase inhibitors          Assessment / number of problems addressed      1.  A very pleasant 69 year old woman with invasive ductal carcinoma left breast T2 N1 M0 ER/MS positive HER2/sloane negative status postlumpectomy.  Started on anastrozole in the middle of October 2022.  So far seems to be tolerating it well.  Slight hyponatremia and musculoskeletal side effects.  2.  Oncotype score of 16.  Normal bone density in November 2022.  3.  Good general health otherwise.  4.  Some concern regarding endocrine therapy.  5.  Some degree of anxiety.  6.  Slight vaginal dryness secondary to anastrozole.  She is also noticing some joint stiffness typical of aromatase inhibitor therapy.  7.  Enlarged thyroid gland.  She sees an endocrinologist for that.  8.  Right hip osteoarthritis.  Had surgery in June 2024.  Did well.    Plan and medical decision making      Presenting with moderate side effects including osteopenia and recent fracture of the left fifth metatarsal.  Reviewed labs.  Personally reviewed the images of the bone density.  Ordered treatment with anastrozole.    At this time treatment with anastrozole 1 mg p.o. daily.  Kristin will stay on this treatment until October 2027.  Due to recent fracture of left fifth metatarsal recommend increasing dose of calcium and vitamin D in her diet.  Next bone density sometime in November 2026  Also talked about the management of vaginal dryness.  Using over-the-counter moisturizing agents including coconut oil, Replens etc. should be used.  If it gets worse then small dose of estradiol cream can also be  applied for finite duration of time.  Mammogram in August 2025.  The longitudinal plan of care for the diagnosis(es)/condition(s) as documented were addressed during this visit. Due to the added complexity in care, I will continue to support Kristin in the subsequent management and with ongoing continuity of care.    Clinical/pathological stage       Cancer Staging   No matching staging information was found for the patient.      History of present illness      Ms. Kristin Almanzar is a 69 year old  referred to us for evaluation of left-sided breast cancer diagnosed in July 2022 when she presented for mammogram in June 2022.  The mammogram was abnormal.  She had some additional studies and then a biopsy.  The biopsy confirmed invasive ductal carcinoma.  She underwent surgery on 27th July 2022.  She had lumpectomy with sentinel lymph node biopsy done.  She had 24 mm invasive ductal carcinoma removed.  [place] lymph nodes was positive  with a 6 mm deposit.  Focal extracapsular extension.  Margins were negative.    She also had an Oncotype score done.  The Oncotype score is 16.    Besides hypertension she has been very healthy person.  Dr. Obrien is her primary care doctor.    Finished radiation in October 2022.  Started on anastrozole after that.  So far seems to be tolerating it okay.  Did do a bone density before starting and it was normal.    Had a visit with Dr. Allen in August 2023 which went fine.  Breast exam was normal.  Her last mammogram was normal as well.      Seen in December 2024.  Exam and findings were normal.  Continue to have some issues with vaginal dryness.    Recently was in vacation in [place] and twisted her foot and found out that she had broken the fifth metatarsal on the left foot.  She is currently in a boot.  Also noticing small lump on her left breast area.    Review of system      Details noted in the history of present illness.  A detailed review of systems is otherwise  "negative.      Physical exam        BP (!) 145/65 (BP Location: Left arm, Patient Position: Sitting, Cuff Size: Adult Regular)   Pulse 57   Temp 97.9  F (36.6  C) (Tympanic)   Resp 16   Ht 1.702 m (5' 7\")   Wt 66.7 kg (147 lb)   LMP  (LMP Unknown)   SpO2 98%   BMI 23.02 kg/m      GENERAL: No acute distress. Cooperative in conversation.   HEENT:  Pupils are equal, round and reactive. Oral mucosa is clean and intact. No ulcerations or mucositis noted. No bleeding noted.  Large thyroid noted.  RESP:Chest symmetric lungs are clear bilaterally per auscultation. Regular respiratory rate. No wheezes or rhonchi.  CV: Normal S1 S2 Regular, rate and rhythm.     ABD: Nondistended, soft, nontender. Positive bowel sounds. No organomegaly.   EXTREMITIES: No lower extremity edema. Left foot in a boot.  NEURO: Non- focal. Alert and oriented x3.  Cranial nerves appear intact.  PSYCH: Within normal limits. No depression or anxiety.  SKIN: Warm dry intact.  BREAST: Bilateral breast examination was done.  Left breast has well-healed incision.  On the medial side of the incision there is some nodularity felt which appears to be mostly postoperative suture nodularity rather than anything unusual.  No other worrisome lump noted in the rest of the breast.  No nipple discharge.  Right breast is normal in exam.  Bilateral axillary examination negative for any lymphadenopathy.      Lab results Reviewed      No results found for this or any previous visit (from the past week).    Imaging results Reviewed        No results found.      Signed by: Quincy Rajput MD      This note has been dictated using voice recognition software. Any grammatical or context distortions are unintentional and inherent to the software   "

## 2025-07-03 ENCOUNTER — PATIENT OUTREACH (OUTPATIENT)
Dept: ONCOLOGY | Facility: HOSPITAL | Age: 70
End: 2025-07-03
Payer: COMMERCIAL

## 2025-07-03 NOTE — PROGRESS NOTES
[facility]: Cancer Care                                                                                            Situation: Patient chart reviewed by care coordinator.    Background: Patient was seen in clinic for a 6 month follow up visit with Dr. Rajput on 7/2/2025 for the management of breast cancer. Patient is currently taking anastrozole for  treatment.     Assessment: Patient is to continue taking anastrozole with no changes.     Plan/Recommendations: Patient is scheduled to return to clinic for a 7 month follow up visit with Katlin on 2/4/2026.     Signature:  Damaris Duron RN

## 2025-08-13 ENCOUNTER — TELEPHONE (OUTPATIENT)
Dept: INTERNAL MEDICINE | Facility: CLINIC | Age: 70
End: 2025-08-13
Payer: COMMERCIAL

## 2025-08-18 DIAGNOSIS — I10 ESSENTIAL HYPERTENSION: ICD-10-CM

## 2025-08-18 RX ORDER — POTASSIUM CHLORIDE 1500 MG/1
20 TABLET, EXTENDED RELEASE ORAL DAILY
Qty: 90 TABLET | Refills: 0 | Status: SHIPPED | OUTPATIENT
Start: 2025-08-18

## 2025-08-25 ENCOUNTER — ANCILLARY PROCEDURE (OUTPATIENT)
Dept: MAMMOGRAPHY | Facility: CLINIC | Age: 70
End: 2025-08-25
Attending: INTERNAL MEDICINE
Payer: COMMERCIAL

## 2025-08-25 DIAGNOSIS — Z12.31 VISIT FOR SCREENING MAMMOGRAM: ICD-10-CM

## 2025-08-25 PROCEDURE — 77063 BREAST TOMOSYNTHESIS BI: CPT

## 2025-08-27 ENCOUNTER — LAB (OUTPATIENT)
Dept: LAB | Facility: CLINIC | Age: 70
End: 2025-08-27
Payer: COMMERCIAL

## 2025-08-27 DIAGNOSIS — M81.0 OSTEOPOROSIS: ICD-10-CM

## 2025-08-27 PROCEDURE — 36415 COLL VENOUS BLD VENIPUNCTURE: CPT

## 2025-08-27 PROCEDURE — 82306 VITAMIN D 25 HYDROXY: CPT

## 2025-08-28 LAB — VIT D+METAB SERPL-MCNC: 25 NG/ML (ref 20–50)
